# Patient Record
Sex: MALE | Race: WHITE | NOT HISPANIC OR LATINO | Employment: OTHER | URBAN - METROPOLITAN AREA
[De-identification: names, ages, dates, MRNs, and addresses within clinical notes are randomized per-mention and may not be internally consistent; named-entity substitution may affect disease eponyms.]

---

## 2017-01-05 ENCOUNTER — TELEPHONE (OUTPATIENT)
Dept: FAMILY MEDICINE | Facility: CLINIC | Age: 67
End: 2017-01-05

## 2017-01-05 NOTE — TELEPHONE ENCOUNTER
----- Message from Zandra Winston sent at 1/5/2017  1:10 PM CST -----  Contact: Patient   Patient request a call back at [phone], Regards to him changing his C-pap machine for a different one.    Thanks  Td

## 2017-01-12 RX ORDER — ALPRAZOLAM 1 MG/1
TABLET ORAL
Qty: 90 TABLET | Refills: 5 | Status: SHIPPED | OUTPATIENT
Start: 2017-01-12 | End: 2017-02-17 | Stop reason: SDUPTHER

## 2017-02-17 ENCOUNTER — OFFICE VISIT (OUTPATIENT)
Dept: FAMILY MEDICINE | Facility: CLINIC | Age: 67
End: 2017-02-17
Payer: COMMERCIAL

## 2017-02-17 VITALS
WEIGHT: 215.94 LBS | HEART RATE: 71 BPM | HEIGHT: 72 IN | SYSTOLIC BLOOD PRESSURE: 135 MMHG | DIASTOLIC BLOOD PRESSURE: 80 MMHG | BODY MASS INDEX: 29.25 KG/M2

## 2017-02-17 DIAGNOSIS — I10 ESSENTIAL HYPERTENSION: ICD-10-CM

## 2017-02-17 DIAGNOSIS — F41.9 ANXIETY: Primary | ICD-10-CM

## 2017-02-17 DIAGNOSIS — G47.33 OSA (OBSTRUCTIVE SLEEP APNEA): ICD-10-CM

## 2017-02-17 PROCEDURE — 3079F DIAST BP 80-89 MM HG: CPT | Mod: S$GLB,,, | Performed by: FAMILY MEDICINE

## 2017-02-17 PROCEDURE — 3075F SYST BP GE 130 - 139MM HG: CPT | Mod: S$GLB,,, | Performed by: FAMILY MEDICINE

## 2017-02-17 PROCEDURE — 99999 PR PBB SHADOW E&M-EST. PATIENT-LVL II: CPT | Mod: PBBFAC,,, | Performed by: FAMILY MEDICINE

## 2017-02-17 PROCEDURE — 99213 OFFICE O/P EST LOW 20 MIN: CPT | Mod: S$GLB,,, | Performed by: FAMILY MEDICINE

## 2017-02-17 RX ORDER — ALPRAZOLAM 1 MG/1
1 TABLET ORAL 3 TIMES DAILY
Qty: 90 TABLET | Refills: 5 | Status: SHIPPED | OUTPATIENT
Start: 2017-02-17 | End: 2017-07-26 | Stop reason: SDUPTHER

## 2017-02-17 NOTE — PROGRESS NOTES
The patient presents to fu anxiety gen tx well w alprazolam   ROS:  General: No fever or sig wt change  HEENT:No other PND eye pain or dc  Respiratory: No cough wheezing  PE: vital signs noted  HEENT: Normocephalic,with no recent trauma,PERRLA,EOMI,conjunctiva injected with no exudate.Nasopharynx is injected and edematous.External otic canal edematous and injected TM dull  Neck:Supple without adenopathy  Chest:Clear bilateral breath sounds    Heart:Regular rhthym without murmer  Abdomen:Soft, non tender,no masses, no hepatosplenomegaly  Extremeties and Neurologic:Grossly within normal limits  MSE: Alert oriented nl affect no SI or tangential thought   Impression:Anxiety  Plan: Rev risks benzos for now continue xanax

## 2017-02-20 ENCOUNTER — OFFICE VISIT (OUTPATIENT)
Dept: FAMILY MEDICINE | Facility: CLINIC | Age: 67
End: 2017-02-20
Payer: COMMERCIAL

## 2017-02-20 VITALS
HEART RATE: 76 BPM | SYSTOLIC BLOOD PRESSURE: 131 MMHG | BODY MASS INDEX: 28.97 KG/M2 | DIASTOLIC BLOOD PRESSURE: 79 MMHG | TEMPERATURE: 98 F | WEIGHT: 213.88 LBS | HEIGHT: 72 IN

## 2017-02-20 DIAGNOSIS — M79.10 TRIGGER POINT: Primary | ICD-10-CM

## 2017-02-20 PROCEDURE — 99213 OFFICE O/P EST LOW 20 MIN: CPT | Mod: S$GLB,,, | Performed by: FAMILY MEDICINE

## 2017-02-20 PROCEDURE — 3075F SYST BP GE 130 - 139MM HG: CPT | Mod: S$GLB,,, | Performed by: FAMILY MEDICINE

## 2017-02-20 PROCEDURE — 99999 PR PBB SHADOW E&M-EST. PATIENT-LVL III: CPT | Mod: PBBFAC,,, | Performed by: FAMILY MEDICINE

## 2017-02-20 PROCEDURE — 3078F DIAST BP <80 MM HG: CPT | Mod: S$GLB,,, | Performed by: FAMILY MEDICINE

## 2017-02-20 RX ORDER — MELOXICAM 15 MG/1
15 TABLET ORAL DAILY
Qty: 30 TABLET | Refills: 1 | Status: SHIPPED | OUTPATIENT
Start: 2017-02-20 | End: 2017-07-25

## 2017-02-20 RX ORDER — METHYLPREDNISOLONE 4 MG/1
TABLET ORAL
Qty: 1 PACKAGE | Refills: 0 | Status: SHIPPED | OUTPATIENT
Start: 2017-02-20 | End: 2017-03-01

## 2017-02-20 NOTE — PROGRESS NOTES
The patient presents with tender painful periscapular on rt  for the past several days    but no obvious injury  ROS:  General: No fever or sig wt change  HEENT:No other PND eye pain or dc  Respiratory: No cough wheezing  PE: vital signs noted  HEENT: Normocephalic,with no recent trauma,PERRLA,EOMI,conjunctiva injected with no exudate.Nasopharynx is injected and edematous.External otic canal edematous and injected TM dull  Neck:Supple without adenopathy  Chest:Clear bilateral breath sounds with mild scattered ronchi  Heart:Regular rhthym without murmer  Abdomen:Soft, non tender,no masses, no hepatosplenomegaly  Extremeties:Trigger pt rt rhomboid      Impression:Trigger pt    Plan: Meloxicam /medrol dspk consider inj

## 2017-02-28 ENCOUNTER — TELEPHONE (OUTPATIENT)
Dept: FAMILY MEDICINE | Facility: CLINIC | Age: 67
End: 2017-02-28

## 2017-02-28 NOTE — TELEPHONE ENCOUNTER
----- Message from Gila Lara sent at 2/28/2017 11:42 AM CST -----  states that he isn't feeling any better, pain going into arm. pls adv..[phone]

## 2017-03-01 ENCOUNTER — OFFICE VISIT (OUTPATIENT)
Dept: FAMILY MEDICINE | Facility: CLINIC | Age: 67
End: 2017-03-01
Payer: MEDICARE

## 2017-03-01 VITALS
SYSTOLIC BLOOD PRESSURE: 132 MMHG | BODY MASS INDEX: 28.86 KG/M2 | DIASTOLIC BLOOD PRESSURE: 82 MMHG | WEIGHT: 213.06 LBS | HEIGHT: 72 IN | HEART RATE: 86 BPM

## 2017-03-01 DIAGNOSIS — M79.10 TRIGGER POINT: Primary | ICD-10-CM

## 2017-03-01 PROCEDURE — 3079F DIAST BP 80-89 MM HG: CPT | Mod: S$GLB,,, | Performed by: FAMILY MEDICINE

## 2017-03-01 PROCEDURE — 99999 PR PBB SHADOW E&M-EST. PATIENT-LVL II: CPT | Mod: PBBFAC,,, | Performed by: FAMILY MEDICINE

## 2017-03-01 PROCEDURE — 1160F RVW MEDS BY RX/DR IN RCRD: CPT | Mod: S$GLB,,, | Performed by: FAMILY MEDICINE

## 2017-03-01 PROCEDURE — 3075F SYST BP GE 130 - 139MM HG: CPT | Mod: S$GLB,,, | Performed by: FAMILY MEDICINE

## 2017-03-01 PROCEDURE — 99213 OFFICE O/P EST LOW 20 MIN: CPT | Mod: S$GLB,,, | Performed by: FAMILY MEDICINE

## 2017-03-01 NOTE — PROGRESS NOTES
The patient presents with tender painful periscapular on rt  for the past several days   Sl better w po steroids then relapsed   ROS:  General: No fever or sig wt change  HEENT:No other PND eye pain or dc  Respiratory: No cough wheezing  PE: vital signs noted  HEENT: Normocephalic,with no recent trauma,PERRLA,EOMI,conjunctiva injected with no exudate.Nasopharynx is injected and edematous.External otic canal edematous and injected TM dull  Neck:Supple without adenopathy  Chest:Clear bilateral breath sounds with mild scattered ronchi  Heart:Regular rhthym without murmer  Abdomen:Soft, non tender,no masses, no hepatosplenomegaly  Extremeties:Trigger pt rt rhomboid      Impression:Trigger pt    Plan: Trigger pt inj  PT/massage

## 2017-07-25 ENCOUNTER — OFFICE VISIT (OUTPATIENT)
Dept: FAMILY MEDICINE | Facility: CLINIC | Age: 67
End: 2017-07-25
Payer: MEDICARE

## 2017-07-25 VITALS
BODY MASS INDEX: 30.8 KG/M2 | DIASTOLIC BLOOD PRESSURE: 88 MMHG | HEART RATE: 71 BPM | TEMPERATURE: 98 F | HEIGHT: 71 IN | WEIGHT: 220 LBS | SYSTOLIC BLOOD PRESSURE: 136 MMHG

## 2017-07-25 DIAGNOSIS — S16.1XXA NECK STRAIN, INITIAL ENCOUNTER: Primary | ICD-10-CM

## 2017-07-25 PROCEDURE — 99999 PR PBB SHADOW E&M-EST. PATIENT-LVL III: CPT | Mod: PBBFAC,,, | Performed by: NURSE PRACTITIONER

## 2017-07-25 PROCEDURE — 96372 THER/PROPH/DIAG INJ SC/IM: CPT | Mod: S$GLB,,, | Performed by: NURSE PRACTITIONER

## 2017-07-25 PROCEDURE — 99213 OFFICE O/P EST LOW 20 MIN: CPT | Mod: 25,S$GLB,, | Performed by: NURSE PRACTITIONER

## 2017-07-25 RX ORDER — CYCLOBENZAPRINE HCL 10 MG
10 TABLET ORAL 3 TIMES DAILY PRN
Qty: 30 TABLET | Refills: 0 | Status: SHIPPED | OUTPATIENT
Start: 2017-07-25 | End: 2017-08-04

## 2017-07-25 RX ORDER — TAMSULOSIN HYDROCHLORIDE 0.4 MG/1
0.4 CAPSULE ORAL
COMMUNITY
Start: 2017-05-26

## 2017-07-25 RX ORDER — SULINDAC 200 MG/1
200 TABLET ORAL 2 TIMES DAILY
Qty: 30 TABLET | Refills: 0 | Status: SHIPPED | OUTPATIENT
Start: 2017-07-25 | End: 2017-09-15 | Stop reason: SDUPTHER

## 2017-07-25 RX ORDER — METHYLPREDNISOLONE ACETATE 80 MG/ML
80 INJECTION, SUSPENSION INTRA-ARTICULAR; INTRALESIONAL; INTRAMUSCULAR; SOFT TISSUE ONCE
Status: COMPLETED | OUTPATIENT
Start: 2017-07-25 | End: 2017-07-25

## 2017-07-25 RX ADMIN — METHYLPREDNISOLONE ACETATE 80 MG: 80 INJECTION, SUSPENSION INTRA-ARTICULAR; INTRALESIONAL; INTRAMUSCULAR; SOFT TISSUE at 10:07

## 2017-07-25 NOTE — PROGRESS NOTES
Subjective:       Patient ID: Andrés Casillas is a 67 y.o. male.    Chief Complaint: Shoulder Pain (right shoulder)    Shoulder Pain    The pain is present in the neck and right shoulder. This is a new problem. The current episode started in the past 7 days. The problem occurs constantly. The problem has been gradually worsening. The quality of the pain is described as aching. The pain is severe. Associated symptoms include a limited range of motion and stiffness. Pertinent negatives include no fever or headaches. The symptoms are aggravated by activity. He has tried NSAIDS and cold (Massage) for the symptoms. The treatment provided no relief.       Review of Systems   Constitutional: Negative for fatigue, fever and unexpected weight change.   HENT: Negative for ear pain and sore throat.    Eyes: Negative.  Negative for pain and visual disturbance.   Respiratory: Negative for cough and shortness of breath.    Cardiovascular: Negative for chest pain and palpitations.   Gastrointestinal: Negative for abdominal pain, diarrhea, nausea and vomiting.   Genitourinary: Negative for dysuria and frequency.   Musculoskeletal: Positive for arthralgias, myalgias, neck pain and stiffness.   Skin: Negative for color change and rash.   Neurological: Negative for dizziness and headaches.   Psychiatric/Behavioral: Negative for sleep disturbance. The patient is not nervous/anxious.        Vitals:    07/25/17 1002   BP: 136/88   Pulse: 71   Temp: 97.7 °F (36.5 °C)       Objective:     Current Outpatient Prescriptions   Medication Sig Dispense Refill    alprazolam (XANAX) 1 MG tablet Take 1 tablet (1 mg total) by mouth 3 (three) times daily. 90 tablet 5    furosemide (LASIX) 40 MG tablet 40 mg daily as needed.   3    cyclobenzaprine (FLEXERIL) 10 MG tablet Take 1 tablet (10 mg total) by mouth 3 (three) times daily as needed for Muscle spasms. 30 tablet 0    sulindac (CLINORIL) 200 MG Tab Take 1 tablet (200 mg total) by mouth 2  (two) times daily. 30 tablet 0    tamsulosin (FLOMAX) 0.4 mg Cp24        No current facility-administered medications for this visit.        Physical Exam   Constitutional: He is oriented to person, place, and time. He appears well-developed. No distress.   HENT:   Head: Normocephalic and atraumatic.   Eyes: EOM are normal. Pupils are equal, round, and reactive to light.   Neck: Normal range of motion. Neck supple.   Cardiovascular: Normal rate and regular rhythm.    Pulmonary/Chest: Effort normal and breath sounds normal.   Musculoskeletal:        Right shoulder: He exhibits decreased range of motion and tenderness.        Cervical back: He exhibits tenderness.        Arms:  Neurological: He is alert and oriented to person, place, and time.   Skin: Skin is warm and dry. No rash noted.   Psychiatric: He has a normal mood and affect. Thought content normal.   Nursing note and vitals reviewed.      Assessment:       1. Neck strain, initial encounter        Plan:   Neck strain, initial encounter  -     methylPREDNISolone acetate injection 80 mg; Inject 1 mL (80 mg total) into the muscle once.    Other orders  -     cyclobenzaprine (FLEXERIL) 10 MG tablet; Take 1 tablet (10 mg total) by mouth 3 (three) times daily as needed for Muscle spasms.  Dispense: 30 tablet; Refill: 0  -     sulindac (CLINORIL) 200 MG Tab; Take 1 tablet (200 mg total) by mouth 2 (two) times daily.  Dispense: 30 tablet; Refill: 0    alternate heat and ice    Return if symptoms worsen or fail to improve.

## 2017-07-26 RX ORDER — ALPRAZOLAM 1 MG/1
TABLET ORAL
Qty: 90 TABLET | Refills: 5 | Status: SHIPPED | OUTPATIENT
Start: 2017-07-26 | End: 2018-02-17 | Stop reason: SDUPTHER

## 2017-09-15 ENCOUNTER — OFFICE VISIT (OUTPATIENT)
Dept: FAMILY MEDICINE | Facility: CLINIC | Age: 67
End: 2017-09-15
Payer: MEDICARE

## 2017-09-15 VITALS
BODY MASS INDEX: 30.66 KG/M2 | TEMPERATURE: 98 F | WEIGHT: 219 LBS | HEIGHT: 71 IN | HEART RATE: 77 BPM | SYSTOLIC BLOOD PRESSURE: 138 MMHG | DIASTOLIC BLOOD PRESSURE: 86 MMHG

## 2017-09-15 DIAGNOSIS — S16.1XXD NECK STRAIN, SUBSEQUENT ENCOUNTER: Primary | ICD-10-CM

## 2017-09-15 PROCEDURE — 99999 PR PBB SHADOW E&M-EST. PATIENT-LVL IV: CPT | Mod: PBBFAC,,, | Performed by: NURSE PRACTITIONER

## 2017-09-15 PROCEDURE — 3079F DIAST BP 80-89 MM HG: CPT | Mod: S$GLB,,, | Performed by: NURSE PRACTITIONER

## 2017-09-15 PROCEDURE — 3008F BODY MASS INDEX DOCD: CPT | Mod: S$GLB,,, | Performed by: NURSE PRACTITIONER

## 2017-09-15 PROCEDURE — 99213 OFFICE O/P EST LOW 20 MIN: CPT | Mod: S$GLB,,, | Performed by: NURSE PRACTITIONER

## 2017-09-15 PROCEDURE — 3075F SYST BP GE 130 - 139MM HG: CPT | Mod: S$GLB,,, | Performed by: NURSE PRACTITIONER

## 2017-09-15 RX ORDER — TRAMADOL HYDROCHLORIDE 50 MG/1
50 TABLET ORAL EVERY 6 HOURS PRN
Qty: 28 TABLET | Refills: 0 | Status: SHIPPED | OUTPATIENT
Start: 2017-09-15 | End: 2017-09-22

## 2017-09-15 RX ORDER — SULINDAC 200 MG/1
200 TABLET ORAL 2 TIMES DAILY
Qty: 30 TABLET | Refills: 0 | Status: SHIPPED | OUTPATIENT
Start: 2017-09-15 | End: 2018-02-17

## 2017-09-15 RX ORDER — CYCLOBENZAPRINE HCL 10 MG
10 TABLET ORAL 3 TIMES DAILY PRN
Qty: 30 TABLET | Refills: 0 | Status: SHIPPED | OUTPATIENT
Start: 2017-09-15 | End: 2017-09-25

## 2017-09-15 NOTE — PATIENT INSTRUCTIONS
Understanding Cervical Radiculopathy    Cervical radiculopathy is irritation or inflammation of a nerve root in the neck. It causes neck pain and other symptoms that may spread into the chest or down the arm. To understand this condition, it helps to understand the parts of the spine:  · Vertebrae. These are bones that stack to form the spine. The cervical spine contains the 7 vertebrae in the neck.  · Disks. These are soft pads of tissue between the vertebrae. They act as shock absorbers for the spine.  · The spinal canal. This is a tunnel formed within the stacked vertebrae. The spinal cord runs through this canal.  · Nerves. These branch off the spinal cord. As they leave the spinal canal, nerves pass through openings between the vertebrae. The nerve root is the part of the nerve that is closest to the spinal cord.   With cervical radiculopathy, nerve roots in the neck become irritated. This leads to pain and symptoms that can travel to the nerves that go from the spinal cord down the arms and into the torso.  What causes cervical radiculopathy?  Aging, injury, poor posture, and other issues can lead to problems in the neck. These problems may then irritate nerve roots. These include:  · Damage to a disk in the cervical spine. The damaged disk may then press on nearby nerve roots.  · Degeneration from wear and tear, and aging. This can lead to narrowing (stenosis) of the openings between the vertebrae. The narrowed openings press on nerve roots as they leave the spinal canal.  · An unstable spine. This is when a vertebra slips forward. It can then press on a nerve root.  There are other, less common causes of pressure on nerves in the neck. These include infection, cysts, and tumors.  Symptoms of cervical radiculopathy  These include:  · Neck pain  · Pain, numbness, tingling, or weakness that travels down the arm  · Loss of neck movement  · Muscle spasms  Treatment for cervical radiculopathy  In most cases,  your healthcare provider will first try treatments that help relieve symptoms. These may include:  · Prescription or over-the-counter pain medicines. These help relieve pain and swelling.  · Cold packs. These help reduce pain.  · Resting. This involves avoiding positions and activities that increase pain.  · Neck brace (cervical collar). This can help relieve inflammation and pain.  · Physical therapy, including exercises and stretches. This can help decrease pain and increase movement and function.  · Shots of medicinesaround the nerve roots. This is done to help relieve symptoms for a time.  In some cases, your healthcare provider may advise surgery to fix the underlying problem. This depends on the cause, the symptoms, and how long the pain has lasted.  Possible complications  Over time, an irritated and inflamed nerve may become damaged. This may lead to long-lasting (permanent) numbness or weakness. If symptoms change suddenly or get worse, be sure to let your healthcare provider know.     When to call your healthcare provider  Call your healthcare provider right away if you have any of these:  · New pain or pain that gets worse  · New or increasing weakness, numbness, or tingling in your arm or hand  · Bowel or bladder changes   Date Last Reviewed: 3/10/2016  © 5866-8741 [employer]. [address] [place] [address], [place] [address]. All rights reserved. This information is not intended as a substitute for professional medical care. Always follow your healthcare professional's instructions.        General Neck and Back Pain    Both neck and back pain are usually caused by injury to the muscles or ligaments of the spine. Sometimes the disks that separate each bone of the spine may cause pain by pressing on a nearby nerve. Back and neck pain may appear after a sudden twisting or bending force (such as in a car accident), or sometimes after a simple awkward movement. In either case, muscle spasm is often  present and adds to the pain.  Acute neck and back pain usually gets better in 1 to 2 weeks. Pain related to disk disease, arthritis in the spinal joints or spinal stenosis (narrowing of the spinal canal) can become chronic and last for months or years.  Back and neck pain are common problems. Most people feel better in 1 or 2 weeks, and most of the rest in 1 to 2 months. Most people can remain active.  People experience and describe pain differently.  · Pain can be sharp, stabbing, shooting, aching, cramping, or burning  · Movement, standing, bending, lifting, sitting, or walking may worsen the pain  · Pain can be localized to one spot or area, or it can be more generalized  · Pain can spread or radiate upwards, downwards, to the front, or go down your arms  · Muscle spasm may occur.  Most of the time mechanical problems with the muscles or spine cause the pain. it is usually caused by an injury, whether known or not, to the muscles or ligaments. While illnesses can cause back pain, it is usually not caused by a serious illness. Pain is usually related to physical activity, whether sports, exercise, work, or normal activity. Sometimes it can occur without an identifiable cause. This can happen simply by stretching or moving wrong, without noting pain at the time. Other causes include:  · Overexertion, lifting, pushing, pulling incorrectly or too aggressively.  · Sudden twisting, bending or stretching from an accident (car or fall), or accidental movement.  · Poor posture  · Poor conditioning, lack of regular exercise  · Spinal disc disease or arthritis  · Stress  · Pregnancy, or illness like appendicitis, bladder or kidney infection, pelvic infections   Home care  · For neck pain: Use a comfortable pillow that supports the head and keeps the spine in a neutral position. The position of the head should not be tilted forward or backward.  · When in bed, try to find a position of comfort. A firm mattress is best. Try  lying flat on your back with pillows under your knees. You can also try lying on your side with your knees bent up towards your chest and a pillow between your knees.  · At first, do not try to stretch out the sore spots. If there is a strain, it is not like the good soreness you get after exercising without an injury. In this case, stretching may make it worse.  · Avoid prolonged sitting, long car rides or travel. This puts more stress on the lower back than standing or walking.  · During the first 24 to 72 hours after an injury, apply an ice pack to the painful area for 20 minutes and then remove it for 20 minutes over a period of 60 to 90 minutes or several times a day.   · You can alternate ice and heat therapies. Talk with your healthcare provider about the best treatment for your back or neck pain. As a safety precaution, do not use a heating pad at bedtime. Sleeping with a heating pad can lead to skin burns or tissue damage.  · Therapeutic massage can help relax the back and neck muscles without stretching them.  · Be aware of safe lifting methods and do not lift anything over 15 pounds until all the pain is gone.  Medications  Talk to your healthcare provider before using medicine, especially if you have other medical problems or are taking other medicines.  · You may use over-the-counter medicine to control pain, unless another pain medicine was prescribed. If you have chronic conditions like diabetes, liver or kidney disease, stomach ulcers,  gastrointestinal bleeding, or are taking blood thinner medicines.  · Be careful if you are given pain medicines, narcotics, or medicine for muscle spasm. They can cause drowsiness, and can affect your coordination, reflexes, and judgment. Do not drive or operate heavy machinery.  Follow-up care  Follow up with your healthcare provider, or as advised. Physical therapy or further tests may be needed.  If X-rays were taken, you will be notified of any new findings that  may affect your care.  Call 911  Seek emergency medical care if any of the following occur:  · Trouble breathing  · Confusion  · Very drowsy or trouble awakening  · Fainting or loss of consciousness  · Rapid or very slow heart rate  · Loss of bowel or bladder control  When to seek medical advice  Call your healthcare provider right away if any of these occur:  · Pain becomes worse or spreads into your arms or legs  · Weakness, numbness or pain in one or both arms or legs  · Numbness in the groin area  · Difficulty walking  · Fever of 100.4ºF (38ºC) or higher, or as directed by your healthcare provider  Date Last Reviewed: 7/1/2016  © 0633-3367 [employer]. [address] [place] [address], [place] [address]. All rights reserved. This information is not intended as a substitute for professional medical care. Always follow your healthcare professional's instructions.

## 2017-09-15 NOTE — PROGRESS NOTES
"    Patient ID: Andrés Casillas is a 67 y.o. male.    Chief Complaint: Shoulder Pain     Numbness in right arm and tingling in left 5th finger.      Shoulder Pain     This is a new problem. Episode onset: 3 days ago.  There has been no history of extremity trauma. The problem has been gradually worsening. Quality:  constant pain. Associated symptoms include numbness, stiffness and tingling. Pertinent negatives include no fever. He has tried NSAIDS and heat for the symptoms. The treatment provided mild relief. Family history does not include arthritis. There is no history of diabetes, Injuries to Extremity or migraines.   Received steroid injection IM in July for same complaints.  He reports he began with symptoms a few days after working out in the gym.    Review of Systems   Constitutional: Negative for chills, fatigue and fever.   HENT: Negative.    Eyes: Negative.    Respiratory: Negative for cough, shortness of breath and wheezing.    Cardiovascular: Negative for chest pain, palpitations and leg swelling.   Gastrointestinal: Negative.    Endocrine: Negative.    Genitourinary: Negative.    Musculoskeletal: Positive for back pain, myalgias and stiffness.   Skin: Negative for rash and wound.   Neurological: Positive for tingling and numbness. Negative for weakness.   Hematological: Negative.    Psychiatric/Behavioral: The patient is not nervous/anxious.          Objective:      /86   Pulse 77   Temp 98.1 °F (36.7 °C) (Oral)   Ht 5' 11" (1.803 m)   Wt 99.3 kg (219 lb)   BMI 30.54 kg/m²     Physical Exam   Constitutional: He is oriented to person, place, and time. He appears well-developed and well-nourished.   HENT:   Head: Normocephalic.   Eyes: Pupils are equal, round, and reactive to light.   Cardiovascular: Normal rate, regular rhythm and normal heart sounds.    Pulmonary/Chest: Effort normal and breath sounds normal. No respiratory distress. He has no wheezes. He has no rales.   Musculoskeletal:    "     Left shoulder: He exhibits decreased range of motion, tenderness and pain. He exhibits no bony tenderness, no swelling, no effusion, no crepitus, no deformity, no laceration, no spasm, normal pulse and normal strength.        Cervical back: He exhibits tenderness. He exhibits normal range of motion.        Arms:  Spasm and tenderness to left upper back with palpation just under left scapula   Neurological: He is alert and oriented to person, place, and time.   Skin: Skin is warm and dry. No rash noted.   Psychiatric: He has a normal mood and affect. His behavior is normal. Judgment and thought content normal.   Vitals reviewed.      Assessment:       1. Neck strain, subsequent encounter          Plan:   Neck strain, subsequent encounter  -     cyclobenzaprine (FLEXERIL) 10 MG tablet; Take 1 tablet (10 mg total) by mouth 3 (three) times daily as needed for Muscle spasms.  Dispense: 30 tablet; Refill: 0  -     sulindac (CLINORIL) 200 MG Tab; Take 1 tablet (200 mg total) by mouth 2 (two) times daily.  Dispense: 30 tablet; Refill: 0  -     tramadol (ULTRAM) 50 mg tablet; Take 1 tablet (50 mg total) by mouth every 6 (six) hours as needed for Pain.  Dispense: 28 tablet; Refill: 0  -     Ambulatory Referral to Physical/Occupational Therapy           Return if symptoms worsen or fail to improve.

## 2017-09-25 ENCOUNTER — TELEPHONE (OUTPATIENT)
Dept: FAMILY MEDICINE | Facility: CLINIC | Age: 67
End: 2017-09-25

## 2017-09-25 NOTE — TELEPHONE ENCOUNTER
----- Message from Emily Calderon sent at 9/25/2017  1:56 PM CDT -----  Contact: pt   Pt would like to get a new order for physical therapy, he wants something closer to his house, please call him back at [phone]  [facility]

## 2018-02-17 ENCOUNTER — OFFICE VISIT (OUTPATIENT)
Dept: FAMILY MEDICINE | Facility: CLINIC | Age: 68
End: 2018-02-17
Payer: MEDICARE

## 2018-02-17 VITALS
BODY MASS INDEX: 31.78 KG/M2 | TEMPERATURE: 98 F | SYSTOLIC BLOOD PRESSURE: 130 MMHG | HEART RATE: 92 BPM | HEIGHT: 71 IN | DIASTOLIC BLOOD PRESSURE: 87 MMHG | WEIGHT: 227 LBS

## 2018-02-17 DIAGNOSIS — M25.561 ACUTE PAIN OF RIGHT KNEE: Primary | ICD-10-CM

## 2018-02-17 PROCEDURE — 3008F BODY MASS INDEX DOCD: CPT | Mod: S$GLB,,, | Performed by: FAMILY MEDICINE

## 2018-02-17 PROCEDURE — 99213 OFFICE O/P EST LOW 20 MIN: CPT | Mod: S$GLB,,, | Performed by: FAMILY MEDICINE

## 2018-02-17 PROCEDURE — 99999 PR PBB SHADOW E&M-EST. PATIENT-LVL III: CPT | Mod: PBBFAC,,, | Performed by: FAMILY MEDICINE

## 2018-02-17 RX ORDER — ALPRAZOLAM 1 MG/1
TABLET ORAL
Qty: 90 TABLET | Refills: 0 | Status: SHIPPED | OUTPATIENT
Start: 2018-02-17 | End: 2018-03-15 | Stop reason: SDUPTHER

## 2018-02-17 RX ORDER — MELOXICAM 15 MG/1
15 TABLET ORAL DAILY
Qty: 30 TABLET | Refills: 0 | Status: SHIPPED | OUTPATIENT
Start: 2018-02-17 | End: 2018-08-01

## 2018-02-17 RX ORDER — METHYLPREDNISOLONE 4 MG/1
TABLET ORAL
Qty: 21 TABLET | Refills: 0 | Status: SHIPPED | OUTPATIENT
Start: 2018-02-17 | End: 2018-03-15

## 2018-02-18 NOTE — PROGRESS NOTES
Complains of some right knee pain approximately past 10 days.  He started doing some increased exercise recently.  No injury.  Current treatment ibuprofen with temporary improvement.  No swelling.  Symptoms lateral and medial.    Past Medical History:  Past Medical History:   Diagnosis Date    Anxiety     BPH (benign prostatic hypertrophy)     DDD (degenerative disc disease), lumbar     Hypertension     GAURANG (obstructive sleep apnea)      Past Surgical History:   Procedure Laterality Date    JOINT REPLACEMENT       Social History     Social History    Marital status: Single     Spouse name: N/A    Number of children: N/A    Years of education: N/A     Occupational History     [employer]     Social History Main Topics    Smoking status: Never Smoker    Smokeless tobacco: Never Used    Alcohol use 1.0 oz/week     2 Standard drinks or equivalent per week      Comment: socially    Drug use: No    Sexual activity: Yes     Partners: Female     Other Topics Concern    Not on file     Social History Narrative    ** Merged History Encounter **          Family History   Problem Relation Age of Onset    Heart disease Mother     Cancer Mother     Heart disease Father     Prostate cancer Brother      Review of patient's allergies indicates:   Allergen Reactions    No known drug allergies      Current Outpatient Prescriptions on File Prior to Visit   Medication Sig Dispense Refill    furosemide (LASIX) 40 MG tablet 40 mg daily as needed.   3    tamsulosin (FLOMAX) 0.4 mg Cp24       [DISCONTINUED] alprazolam (XANAX) 1 MG tablet TAKE ONE TABLET BY MOUTH THREE TIMES DAILY 90 tablet 5    [DISCONTINUED] sulindac (CLINORIL) 200 MG Tab Take 1 tablet (200 mg total) by mouth 2 (two) times daily. 30 tablet 0     No current facility-administered medications on file prior to visit.          OBJECTIVE:     Vitals:    02/17/18 0922   BP: 130/87   Pulse: 92   Temp: 97.9 °F (36.6 °C)   TempSrc: Oral   Weight: 103 kg  "(227 lb)   Height: 5' 11" (1.803 m)     Wt Readings from Last 3 Encounters:   02/17/18 103 kg (227 lb)   09/15/17 99.3 kg (219 lb)   07/25/17 99.8 kg (220 lb)     APPEARANCE: Well nourished, well developed, in no acute distress.    PERIPHERAL VASCULAR: No cyanosis.  No clubbing.  No edema.  NEUROLOGIC: No focal findings.  MENTAL STATUS: Alert.  Oriented x 3.  Both knees full range of motion.  Mild tenderness palpation lateral and medial joint line on the right.  No effusion.  Negative Lachman's and Elaine's.    Andrés was seen today for knee pain.    Diagnoses and all orders for this visit:    Acute pain of right knee    Other orders  -     meloxicam (MOBIC) 15 MG tablet; Take 1 tablet (15 mg total) by mouth once daily.  -     ranitidine (ZANTAC) 150 MG tablet; Take 1 tablet (150 mg total) by mouth 2 (two) times daily.  -     methylPREDNISolone (MEDROL DOSEPACK) 4 mg tablet; follow package directions      Relative rest.  Follow-up PCP symptoms not improving in the next couple weeks.          "

## 2018-03-06 ENCOUNTER — PATIENT OUTREACH (OUTPATIENT)
Dept: ADMINISTRATIVE | Facility: HOSPITAL | Age: 68
End: 2018-03-06

## 2018-03-06 NOTE — LETTER
March 6, 2018        We are seeing Andrés Casillas, 1950, at [facility]. Cabrera Alan MD is their primary care physician. To help with our [place] maintenance records could you please send the following:     PATIENT LAST COLONOSCOPY REPORT    Please fax to [facility] at [phone], attention Stacy López LPN.    Thank-you in advance for your assistance. If you have any questions or concerns please contact me at [phone].     Stacy López LPN  Care Coordination Department  [facility]

## 2018-03-06 NOTE — PROGRESS NOTES
Colonoscopy was to be done by Dr. Rich Richards per pt 4/17/16 note. Consult done with Dr. Richards 5/18/2016. Request sent for pt Colonoscopy Report.

## 2018-03-07 NOTE — PROGRESS NOTES
Colonoscopy report dated 5/4/2016 received from Dr. Rich Richards [facility]. HM update and report sent to scanning.

## 2018-03-15 ENCOUNTER — OFFICE VISIT (OUTPATIENT)
Dept: FAMILY MEDICINE | Facility: CLINIC | Age: 68
End: 2018-03-15
Payer: MEDICARE

## 2018-03-15 VITALS
BODY MASS INDEX: 30.77 KG/M2 | WEIGHT: 219.81 LBS | SYSTOLIC BLOOD PRESSURE: 137 MMHG | TEMPERATURE: 98 F | HEART RATE: 77 BPM | DIASTOLIC BLOOD PRESSURE: 82 MMHG | HEIGHT: 71 IN

## 2018-03-15 DIAGNOSIS — S89.91XD INJURY OF RIGHT KNEE, SUBSEQUENT ENCOUNTER: Primary | ICD-10-CM

## 2018-03-15 PROCEDURE — 3075F SYST BP GE 130 - 139MM HG: CPT | Mod: CPTII,S$GLB,, | Performed by: FAMILY MEDICINE

## 2018-03-15 PROCEDURE — 99999 PR PBB SHADOW E&M-EST. PATIENT-LVL III: CPT | Mod: PBBFAC,,, | Performed by: FAMILY MEDICINE

## 2018-03-15 PROCEDURE — 3079F DIAST BP 80-89 MM HG: CPT | Mod: CPTII,S$GLB,, | Performed by: FAMILY MEDICINE

## 2018-03-15 PROCEDURE — 99213 OFFICE O/P EST LOW 20 MIN: CPT | Mod: S$GLB,,, | Performed by: FAMILY MEDICINE

## 2018-03-15 RX ORDER — ALPRAZOLAM 1 MG/1
1 TABLET ORAL 3 TIMES DAILY
Qty: 90 TABLET | Refills: 4 | Status: CANCELLED | OUTPATIENT
Start: 2018-03-15

## 2018-03-15 RX ORDER — HYDROCODONE BITARTRATE AND ACETAMINOPHEN 5; 325 MG/1; MG/1
1 TABLET ORAL EVERY 6 HOURS PRN
COMMUNITY
Start: 2018-03-09 | End: 2018-08-01

## 2018-03-15 RX ORDER — ALPRAZOLAM 1 MG/1
TABLET ORAL
Qty: 90 TABLET | Refills: 5 | Status: SHIPPED | OUTPATIENT
Start: 2018-03-15 | End: 2018-08-01 | Stop reason: SDUPTHER

## 2018-03-22 RX ORDER — ALBUTEROL SULFATE 90 UG/1
2 AEROSOL, METERED RESPIRATORY (INHALATION) EVERY 6 HOURS PRN
Qty: 18 G | Refills: 4 | Status: SHIPPED | OUTPATIENT
Start: 2018-03-22 | End: 2019-04-02 | Stop reason: SDUPTHER

## 2018-03-22 NOTE — TELEPHONE ENCOUNTER
----- Message from Vilma Bowling sent at 3/22/2018  3:47 PM CDT -----  Contact: pt  Pt states he's call for prescription for a inhaler, he can be reached at [phone] Thanks      [facility] [address] [facility]  [address] [address]  Phone: [phone] Fax: [phone]

## 2018-04-04 ENCOUNTER — TELEPHONE (OUTPATIENT)
Dept: FAMILY MEDICINE | Facility: CLINIC | Age: 68
End: 2018-04-04

## 2018-04-04 NOTE — TELEPHONE ENCOUNTER
----- Message from Jaime Acevedo sent at 4/4/2018  4:33 PM CDT -----  Contact: Pt   Pt requested a callback  need to verify if images of his knee was received callback number is..[phone]

## 2018-04-04 NOTE — TELEPHONE ENCOUNTER
Spoke with patient, he didn't remember if he signed a release request for records.  He will just go get records fr Dr. Boykin and bring them to us.

## 2018-07-24 ENCOUNTER — TELEPHONE (OUTPATIENT)
Dept: FAMILY MEDICINE | Facility: CLINIC | Age: 68
End: 2018-07-24

## 2018-07-24 DIAGNOSIS — I10 HYPERTENSION, UNSPECIFIED TYPE: ICD-10-CM

## 2018-07-24 DIAGNOSIS — R53.83 FATIGUE, UNSPECIFIED TYPE: Primary | ICD-10-CM

## 2018-07-24 NOTE — TELEPHONE ENCOUNTER
----- Message from Smiley Delgadillo sent at 7/24/2018  2:58 PM CDT -----  Contact: self [phone] or [phone]  States that he would like an order put in for labs. States that he is not feeling good and would like to have some labs ordered. Please call back at [phone] or [phone]//thank you acc

## 2018-07-26 ENCOUNTER — TELEPHONE (OUTPATIENT)
Dept: FAMILY MEDICINE | Facility: CLINIC | Age: 68
End: 2018-07-26

## 2018-07-26 ENCOUNTER — LAB VISIT (OUTPATIENT)
Dept: LAB | Facility: HOSPITAL | Age: 68
End: 2018-07-26
Attending: FAMILY MEDICINE
Payer: MEDICARE

## 2018-07-26 DIAGNOSIS — R53.83 FATIGUE, UNSPECIFIED TYPE: ICD-10-CM

## 2018-07-26 DIAGNOSIS — G47.33 OSA (OBSTRUCTIVE SLEEP APNEA): Primary | ICD-10-CM

## 2018-07-26 DIAGNOSIS — I10 HYPERTENSION, UNSPECIFIED TYPE: ICD-10-CM

## 2018-07-26 LAB
ALBUMIN SERPL BCP-MCNC: 4 G/DL
ALP SERPL-CCNC: 111 U/L
ALT SERPL W/O P-5'-P-CCNC: 35 U/L
ANION GAP SERPL CALC-SCNC: 7 MMOL/L
AST SERPL-CCNC: 32 U/L
BASOPHILS # BLD AUTO: 0.06 K/UL
BASOPHILS NFR BLD: 1.1 %
BILIRUB SERPL-MCNC: 0.7 MG/DL
BUN SERPL-MCNC: 25 MG/DL
CALCIUM SERPL-MCNC: 9.5 MG/DL
CHLORIDE SERPL-SCNC: 107 MMOL/L
CHOLEST SERPL-MCNC: 150 MG/DL
CHOLEST/HDLC SERPL: 3 {RATIO}
CO2 SERPL-SCNC: 26 MMOL/L
CREAT SERPL-MCNC: 1 MG/DL
DIFFERENTIAL METHOD: ABNORMAL
EOSINOPHIL # BLD AUTO: 0.2 K/UL
EOSINOPHIL NFR BLD: 4.2 %
ERYTHROCYTE [DISTWIDTH] IN BLOOD BY AUTOMATED COUNT: 13.2 %
EST. GFR  (AFRICAN AMERICAN): >60 ML/MIN/1.73 M^2
EST. GFR  (NON AFRICAN AMERICAN): >60 ML/MIN/1.73 M^2
GLUCOSE SERPL-MCNC: 100 MG/DL
HCT VFR BLD AUTO: 50.9 %
HDLC SERPL-MCNC: 50 MG/DL
HDLC SERPL: 33.3 %
HGB BLD-MCNC: 16.9 G/DL
IMM GRANULOCYTES # BLD AUTO: 0.01 K/UL
IMM GRANULOCYTES NFR BLD AUTO: 0.2 %
LDLC SERPL CALC-MCNC: 90 MG/DL
LYMPHOCYTES # BLD AUTO: 2.6 K/UL
LYMPHOCYTES NFR BLD: 45.1 %
MCH RBC QN AUTO: 31.4 PG
MCHC RBC AUTO-ENTMCNC: 33.2 G/DL
MCV RBC AUTO: 95 FL
MONOCYTES # BLD AUTO: 0.7 K/UL
MONOCYTES NFR BLD: 11.9 %
NEUTROPHILS # BLD AUTO: 2.1 K/UL
NEUTROPHILS NFR BLD: 37.5 %
NONHDLC SERPL-MCNC: 100 MG/DL
NRBC BLD-RTO: 0 /100 WBC
PLATELET # BLD AUTO: 247 K/UL
PMV BLD AUTO: 10.6 FL
POTASSIUM SERPL-SCNC: 4.9 MMOL/L
PROT SERPL-MCNC: 7.1 G/DL
RBC # BLD AUTO: 5.38 M/UL
SODIUM SERPL-SCNC: 140 MMOL/L
TRIGL SERPL-MCNC: 50 MG/DL
WBC # BLD AUTO: 5.7 K/UL

## 2018-07-26 PROCEDURE — 36415 COLL VENOUS BLD VENIPUNCTURE: CPT | Mod: PO

## 2018-07-26 PROCEDURE — 80053 COMPREHEN METABOLIC PANEL: CPT

## 2018-07-26 PROCEDURE — 80061 LIPID PANEL: CPT

## 2018-07-26 PROCEDURE — 85025 COMPLETE CBC W/AUTO DIFF WBC: CPT

## 2018-07-26 NOTE — TELEPHONE ENCOUNTER
----- Message from [place]o Connor sent at 7/26/2018  2:23 PM CDT -----  Contact: pt  Please give pt a call at [phone] regarding a sleep study being scheduled.

## 2018-07-27 ENCOUNTER — TELEPHONE (OUTPATIENT)
Dept: FAMILY MEDICINE | Facility: CLINIC | Age: 68
End: 2018-07-27

## 2018-07-27 NOTE — TELEPHONE ENCOUNTER
----- Message from Cabrera Alan MD sent at 7/27/2018  8:54 AM CDT -----  I need to document clinical reasons for the sleep test referral-pls set him up for appt at his convenience and we can count this for his xanax refill so he wont have to come back in September  ----- Message -----  From: Amandeep Selby MD  Sent: 7/27/2018   8:40 AM  To: Cabrera Alan MD, Kyle Alan,  To approve the test need supporting clinicals in chart  Please document and let me know    Thanks    Amandeep Selby MD    ----- Message -----  From: Kyle Scruggs  Sent: 7/27/2018   8:17 AM  To: Amandeep Selby MD    Review [facility]

## 2018-07-27 NOTE — TELEPHONE ENCOUNTER
----- Message from Nellie Kay sent at 7/27/2018  9:07 AM CDT -----  Pt returning phone call..[phone]

## 2018-08-01 ENCOUNTER — OFFICE VISIT (OUTPATIENT)
Dept: FAMILY MEDICINE | Facility: CLINIC | Age: 68
End: 2018-08-01
Payer: MEDICARE

## 2018-08-01 VITALS
WEIGHT: 214.19 LBS | SYSTOLIC BLOOD PRESSURE: 136 MMHG | DIASTOLIC BLOOD PRESSURE: 82 MMHG | HEIGHT: 71 IN | BODY MASS INDEX: 29.98 KG/M2 | TEMPERATURE: 98 F | HEART RATE: 77 BPM

## 2018-08-01 DIAGNOSIS — G47.33 OSA (OBSTRUCTIVE SLEEP APNEA): Primary | ICD-10-CM

## 2018-08-01 PROCEDURE — 3075F SYST BP GE 130 - 139MM HG: CPT | Mod: CPTII,S$GLB,, | Performed by: FAMILY MEDICINE

## 2018-08-01 PROCEDURE — 3079F DIAST BP 80-89 MM HG: CPT | Mod: CPTII,S$GLB,, | Performed by: FAMILY MEDICINE

## 2018-08-01 PROCEDURE — 99999 PR PBB SHADOW E&M-EST. PATIENT-LVL III: CPT | Mod: PBBFAC,,, | Performed by: FAMILY MEDICINE

## 2018-08-01 PROCEDURE — 99213 OFFICE O/P EST LOW 20 MIN: CPT | Mod: S$GLB,,, | Performed by: FAMILY MEDICINE

## 2018-08-01 RX ORDER — ALPRAZOLAM 1 MG/1
1 TABLET ORAL 3 TIMES DAILY
Qty: 90 TABLET | Refills: 5 | Status: SHIPPED | OUTPATIENT
Start: 2018-08-01 | End: 2019-02-14

## 2018-08-01 NOTE — PROGRESS NOTES
The patient presents today to evaluate GAURANG but has lost weight and questions if he still gets symptoms and whether he still needs CPAP. Has not been evaluated w sleep study in over 18 years.   Also repoprts severe fatigue   Past Medical History:  Past Medical History:   Diagnosis Date    Anxiety     BPH (benign prostatic hypertrophy)     DDD (degenerative disc disease), lumbar     Hypertension     GAURANG (obstructive sleep apnea)      Past Surgical History:   Procedure Laterality Date    JOINT REPLACEMENT       Review of patient's allergies indicates:   Allergen Reactions    No known drug allergies      Current Outpatient Prescriptions on File Prior to Visit   Medication Sig Dispense Refill    albuterol 90 mcg/actuation inhaler Inhale 2 puffs into the lungs every 6 (six) hours as needed for Wheezing. Rescue 18 g 4    ALPRAZolam (XANAX) 1 MG tablet TAKE ONE TABLET BY MOUTH THREE TIMES DAILY 90 tablet 5    furosemide (LASIX) 40 MG tablet 40 mg daily as needed.   3    tamsulosin (FLOMAX) 0.4 mg Cp24       [DISCONTINUED] hydrocodone-acetaminophen 5-325mg (NORCO) 5-325 mg per tablet Take 1 tablet by mouth every 6 (six) hours as needed.       [DISCONTINUED] meloxicam (MOBIC) 15 MG tablet Take 1 tablet (15 mg total) by mouth once daily. 30 tablet 0    [DISCONTINUED] ranitidine (ZANTAC) 150 MG tablet Take 1 tablet (150 mg total) by mouth 2 (two) times daily. 60 tablet 0     No current facility-administered medications on file prior to visit.      Social History     Social History    Marital status: Single     Spouse name: N/A    Number of children: N/A    Years of education: N/A     Occupational History     [employer]     Social History Main Topics    Smoking status: Never Smoker    Smokeless tobacco: Never Used    Alcohol use 1.0 oz/week     2 Standard drinks or equivalent per week      Comment: socially    Drug use: No    Sexual activity: Yes     Partners: Female     Other Topics Concern    Not on  file     Social History Narrative    ** Merged History Encounter **          Family History   Problem Relation Age of Onset    Heart disease Mother     Cancer Mother     Heart disease Father     Prostate cancer Brother          ROS:GENERAL: No fever, chills, fatigability or weight loss.  SKIN: No rashes, itching or changes in color or texture of skin.  HEAD: No headaches or recent head trauma.EYES: Visual acuity fine. No photophobia, ocular pain or diplopia.EARS: Denies ear pain, discharge or vertigo.NOSE: No loss of smell, no epistaxis or postnasal drip.MOUTH & THROAT: No hoarseness or change in voice. No excessive gum bleeding.NODES: Denies swollen glands.  CHEST: Denies RIVAS, cyanosis, wheezing, cough and sputum production.  CARDIOVASCULAR: Denies chest pain, PND, orthopnea or reduced exercise tolerance.  ABDOMEN: Appetite fine. No weight loss. Denies diarrhea, abdominal pain, hematemesis or blood in stool.  URINARY: No flank pain, dysuria or hematuria.  PERIPHERAL VASCULAR: No claudication or cyanosis.  MUSCULOSKELETAL: See above.  NEUROLOGIC: No history of seizures, paralysis, alteration of gait or coordination.  PE:   HEAD: Normocephalic, atraumatic.EYES: PERRL. EOMI.   EARS: TM's intact. Light reflex normal. No retraction or perforation.   NOSE: Mucosa pink. Airway clear.MOUTH & THROAT: No tonsillar enlargement. No pharyngeal erythema or exudate. No stridor.  NODES: No cervical, axillary or inguinal lymph node enlargement.  CHEST: Lungs clear to auscultation.  CARDIOVASCULAR: Normal S1, S2. No rubs, murmurs or gallops.  ABDOMEN: Bowel sounds normal. Not distended. Soft. No tenderness or masses.  MUSCULOSKELETAL: No palpable abnormality  NEUROLOGIC: Cranial Nerves: II-XII grossly intact.  Motor: 5/5 strength major flexors/extensors.  DTR's: Knees, Ankles 2+ and equal bilaterally; downgoing toes.  Sensory: Intact to light touch distally.  Gait & Posture: Normal gait and fine motion. No cerebellar signs.      Impression:GAURANG  Fatigue  Plan:Lab eval reveiwed  Rec update sleep study   Rec diet and ex recs

## 2018-08-17 ENCOUNTER — TELEPHONE (OUTPATIENT)
Dept: FAMILY MEDICINE | Facility: CLINIC | Age: 68
End: 2018-08-17

## 2018-08-17 NOTE — TELEPHONE ENCOUNTER
----- Message from Anoop Tellez sent at 8/17/2018 10:33 AM CDT -----  Pt is requesting a call from nurse to discuss labs.      Please call pt back at [phone].        .  .    [facility]/pharmacy #[id number] - [place] - [address] [place] [address]  Phone: [phone] Fax: [phone]

## 2018-08-22 ENCOUNTER — PROCEDURE VISIT (OUTPATIENT)
Dept: SLEEP MEDICINE | Facility: CLINIC | Age: 68
End: 2018-08-22
Payer: MEDICARE

## 2018-08-22 DIAGNOSIS — G47.33 OSA (OBSTRUCTIVE SLEEP APNEA): ICD-10-CM

## 2018-08-22 PROCEDURE — 95806 SLEEP STUDY UNATT&RESP EFFT: CPT | Mod: S$GLB,,, | Performed by: INTERNAL MEDICINE

## 2018-08-22 PROCEDURE — 99499 UNLISTED E&M SERVICE: CPT | Mod: S$GLB,,, | Performed by: INTERNAL MEDICINE

## 2018-08-22 NOTE — PROCEDURES
Home Sleep Studies  Date/Time: 8/22/2018 8:55 AM  Performed by: Amandeep Selby MD  Authorized by: Cabrera Alan MD       Assessment and Recommendations  Adequate study. 7 hours 7 minutes  Saturation elian was 89%.  Average HR 67 BPM  Snoring above 50  for 96% time  Apnea Hypopnea Index: 17.2/hr ( 123 events)  MODERATE OBSTRUCTIVE SLEEP APNEA.  Treatment recommendations:  CPAP would be the guideline recommendation for first-line treatment for obstructive sleep apnea.  Either order in lab CPAP titration or AutoPAP device.  Follow-up evaluation and treatment in the sleep disorder clinic.  Please make referal  Other modalities for treatment of obstructive sleep apnea may be explored in patients with intolerant to CPAP  including evaluation by ear nose  and throat, mandibular advancement device, nonsupine sleep positioning device or Hypoglossal nerve pacemaker (  INSPIRE) .  Close follow-up to ensure resolution of symptoms.

## 2018-08-22 NOTE — Clinical Note
Assessment and RecommendationsAdequate study. 7 hours 7 minutesSaturation elian was 89%.Average HR 67 BPMSnoring above 50  for 96% timeApnea Hypopnea Index: 17.2/hr ( 123 events)MODERATE OBSTRUCTIVE SLEEP APNEA.Treatment recommendations:CPAP would be the guideline recommendation for first-line treatment for obstructive sleep apnea.Either order in lab CPAP titration or AutoPAP device.Follow-up evaluation and treatment in the sleep disorder clinic.Please make referalOther modalities for treatment of obstructive sleep apnea may be explored in patients with intolerant to CPAPincluding evaluation by ear noseand throat, mandibular advancement device, nonsupine sleep positioning device or Hypoglossal nerve pacemaker (INSPIRE) .Close follow-up to ensure resolution of symptoms.

## 2018-08-22 NOTE — PROGRESS NOTES
Assessment and Recommendations  Adequate study. 7 hours 7 minutes  Saturation elian was 89%.  Average HR 67 BPM  Snoring above 50  for 96% time  Apnea Hypopnea Index: 17.2/hr ( 123 events)  MODERATE OBSTRUCTIVE SLEEP APNEA.  Treatment recommendations:  CPAP would be the guideline recommendation for first-line treatment for obstructive sleep apnea.  Either order in lab CPAP titration or AutoPAP device.  Follow-up evaluation and treatment in the sleep disorder clinic.  Please make referal  Other modalities for treatment of obstructive sleep apnea may be explored in patients with intolerant to CPAP  including evaluation by ear nose  and throat, mandibular advancement device, nonsupine sleep positioning device or Hypoglossal nerve pacemaker (  INSPIRE) .  Close follow-up to ensure resolution of symptoms.

## 2018-09-18 ENCOUNTER — TELEPHONE (OUTPATIENT)
Dept: SLEEP MEDICINE | Facility: CLINIC | Age: 68
End: 2018-09-18

## 2018-09-18 ENCOUNTER — TELEPHONE (OUTPATIENT)
Dept: FAMILY MEDICINE | Facility: CLINIC | Age: 68
End: 2018-09-18

## 2018-09-18 NOTE — TELEPHONE ENCOUNTER
----- Message from Tamara Santoro sent at 9/18/2018  4:27 PM CDT -----  Contact: Self            Name of Who is Calling: Self      What is the request in detail: Pt had a home sleep study done on 08/22 and states he needs to know if he needs to come in for a consult prior to getting a prescription for a machine.      Can the clinic reply by [facility]: N      What Number to Call Back if not in [facility]: [phone]

## 2018-09-18 NOTE — TELEPHONE ENCOUNTER
----- Message from Aury Woodson sent at 9/18/2018 11:55 AM CDT -----  Contact: self  Pt is calling in regards to sleep study results. Pt sent machine back in by mail and hasn't heard anything yet. Pt was wondering what was going on with the sleep study. Please call back at [phone].    Thanks,   Aury Woodson

## 2018-09-18 NOTE — TELEPHONE ENCOUNTER
Pt had a home sleep study done on 08/22 and states he needs to know if he needs to come in for a consult prior to getting a prescription for a machine.  Informed patient that since he has never been seen in office, we would need to schedule an appointment.   Patient scheduled for 9/21.

## 2018-09-18 NOTE — TELEPHONE ENCOUNTER
Result MODERATE OBSTRUCTIVE SLEEP APNEA.  Treatment recommendations:Follow-up evaluation and treatment in the sleep disorder clinic for CPAP titration.

## 2018-09-21 ENCOUNTER — OFFICE VISIT (OUTPATIENT)
Dept: SLEEP MEDICINE | Facility: CLINIC | Age: 68
End: 2018-09-21
Payer: MEDICARE

## 2018-09-21 VITALS
WEIGHT: 218.31 LBS | BODY MASS INDEX: 30.56 KG/M2 | DIASTOLIC BLOOD PRESSURE: 88 MMHG | HEIGHT: 71 IN | SYSTOLIC BLOOD PRESSURE: 154 MMHG | HEART RATE: 79 BPM

## 2018-09-21 DIAGNOSIS — G47.33 OSA (OBSTRUCTIVE SLEEP APNEA): Primary | ICD-10-CM

## 2018-09-21 PROCEDURE — 99213 OFFICE O/P EST LOW 20 MIN: CPT | Mod: PBBFAC,PO | Performed by: PSYCHIATRY & NEUROLOGY

## 2018-09-21 PROCEDURE — 3079F DIAST BP 80-89 MM HG: CPT | Mod: CPTII,,, | Performed by: PSYCHIATRY & NEUROLOGY

## 2018-09-21 PROCEDURE — 3077F SYST BP >= 140 MM HG: CPT | Mod: CPTII,,, | Performed by: PSYCHIATRY & NEUROLOGY

## 2018-09-21 PROCEDURE — 99999 PR PBB SHADOW E&M-EST. PATIENT-LVL III: CPT | Mod: PBBFAC,,, | Performed by: PSYCHIATRY & NEUROLOGY

## 2018-09-21 PROCEDURE — 99204 OFFICE O/P NEW MOD 45 MIN: CPT | Mod: S$PBB,,, | Performed by: PSYCHIATRY & NEUROLOGY

## 2018-09-21 PROCEDURE — 1101F PT FALLS ASSESS-DOCD LE1/YR: CPT | Mod: CPTII,,, | Performed by: PSYCHIATRY & NEUROLOGY

## 2018-09-21 NOTE — PATIENT INSTRUCTIONS
PLAN:  Increase APAP 5-15 to 5-17  CPAP Comfort Cover (full face size) - company or online  Chin strap CPAP - [facility], or Access, or the one close to home  If no improvement in 3 months - will do CPAP titration

## 2018-09-21 NOTE — PROGRESS NOTES
Andrés Casillas  was seen at the request of  Self, Aaareferral for sleep evaluation.    09/21/2018 INITIAL HISTORY OF PRESENT ILLNESS:  Andrés Casillas is a 68 y.o. male is here to be evaluated for a sleep disorder.       CHIEF COMPLAINT:      The patient's complaints include excessive daytime sleepiness, excessive daytime fatigue, snoring,  witnessed breathing pauses,  gasping for air in sleep and interrupted sleep.  Diagnosed GAURANG many years ago.     Lost 40 lbs - now pressure feels too high and the mask is leaking since he lost weight from his face.    Had accusome - moderate GAURANG by 4A criteria; mild GAURANG by 4B criteria;     Using APAP  Resmed Airsence 5-15; 90% compliance; AHI 0.2; 90% pressure was 14.2    EPWORTH SLEEPINESS SCALE 9/21/2018   Sitting and reading 1   Watching TV 1   Sitting, inactive in a public place (e.g. a theatre or a meeting) 0   As a passenger in a car for an hour without a break 0   Lying down to rest in the afternoon when circumstances permit 3   Sitting and talking to someone 0   Sitting quietly after a lunch without alcohol 3   In a car, while stopped for a few minutes in traffic 0   Total score 8       SLEEP ROUTINE AND LIFESTYLE 09/21/2018 :  Sleep Clinic New Patient 9/21/2018   What time do you go to bed on a week day? (Give a range) 10 to 11 pm   What time do you go to bed on a day off? (Give a range) 10 to 11 pm   How long does it take you to fall asleep? (Give a range) Immediately   On average, how many times per night do you wake up? 5   How long does it take you to fall back into sleep? (Give a range) A few minutes   What time do you wake up to start your day on a week day? (Give a range) 7:30 am   What time do you wake up to start your day on a day off? (Give a range) 7:30 am   What time do you get out of bed? (Give a range) 8:30 am   On average, how many hours do you sleep? 7 to 9   On average, how many naps do you take per day? 0   Rate your sleep quality from 0 to 5  (0-poor, 5-great). 4   Have you experienced:  Weight loss?   How much weight have you lost or gained (in lbs.) in the last year? 40   On average, how many times per night do you go to the bathroom?  1 to 2   Have you ever had a sleep study/CPAP machine/surgery for sleep apnea? Yes   Have you ever had a CPAP machine for sleep apnea? Yes   Have you ever had surgery for sleep apnea? No       Sleep Clinic ROS  9/21/2018   Difficulty breathing through the nose?  Yes   Sore throat or dry mouth in the morning? Yes   Irregular or very fast heart beat?  No   Shortness of breath?  No   Acid reflux? No   Body aches and pains?  Yes   Morning headaches? No   Dizziness? Sometimes   Mood changes?  Yes   Do you exercise?  Sometimes   Do you feel like moving your legs a lot?  No          Denies symptoms concerning for parasomnia      The patient never had tonsillectomy, adenoidectomy or UPPP      Social History:      Occupation:  Bed partner:   Exercise routine:   Caffeine:  Coffee   , cokes  , tea       PREVIOUS SLEEP STUDIES:     HST 8/15/18: Significant Obstructive sleep apnea (GAURANG) with AHI (apnea hypopnea Index) of 17.2 and SaO2 of 89% .  Previous baseline N/a.      DME: [employer] - wants to switch      PAST MEDICAL HISTORY:    Active Ambulatory Problems     Diagnosis Date Noted    Hypertension     BPH (benign prostatic hypertrophy)     GAURANG (obstructive sleep apnea)      Resolved Ambulatory Problems     Diagnosis Date Noted    No Resolved Ambulatory Problems     Past Medical History:   Diagnosis Date    Anxiety     BPH (benign prostatic hypertrophy)     DDD (degenerative disc disease), lumbar     Hypertension     GAURANG (obstructive sleep apnea)                 PAST SURGICAL HISTORY:    Past Surgical History:   Procedure Laterality Date    JOINT REPLACEMENT           FAMILY HISTORY:                Family History   Problem Relation Age of Onset    Heart disease Mother     Cancer Mother     Heart disease Father      "Prostate cancer Brother        SOCIAL HISTORY:          Tobacco:   Social History     Tobacco Use   Smoking Status Never Smoker   Smokeless Tobacco Never Used       alcohol use:    Social History     Substance and Sexual Activity   Alcohol Use Yes    Alcohol/week: 1.0 oz    Types: 2 Standard drinks or equivalent per week    Comment: socially                   ALLERGIES:    Review of patient's allergies indicates:   Allergen Reactions    No known drug allergies        CURRENT MEDICATIONS:    Current Outpatient Medications   Medication Sig Dispense Refill    albuterol 90 mcg/actuation inhaler Inhale 2 puffs into the lungs every 6 (six) hours as needed for Wheezing. Rescue 18 g 4    ALPRAZolam (XANAX) 1 MG tablet Take 1 tablet (1 mg total) by mouth 3 (three) times daily. 90 tablet 5    furosemide (LASIX) 40 MG tablet 40 mg daily as needed.   3    tamsulosin (FLOMAX) 0.4 mg Cp24        No current facility-administered medications for this visit.                         PHYSICAL EXAM:  BP (!) 154/88   Pulse 79   Ht 5' 11" (1.803 m)   Wt 99 kg (218 lb 4.8 oz)   BMI 30.45 kg/m²   GENERAL: Overweight body habitus, well groomed.  HEENT:   HEENT:  Conjunctivae are non-erythematous; Pupils equal, round, and reactive to light; Nose is symmetrical; Nasal mucosa is pink and moist; Septum is midline; Inferior turbinates are normal; Nasal airflow is normal; Posterior pharynx is pink; Modified Mallampati:III-IV; Posterior palate is low; Tonsils not visualized; Uvula is wide and elongated;Tongue is enlarged; Dentition is fair; No TMJ tenderness; Jaw opening and protrusion without click and without discomfort.  NECK: Supple. Neck circumference is 16 inches. No thyromegaly. No palpable nodes.     SKIN: On face and neck: No abrasions, no rashes, no lesions.  No subcutaneous nodules are palpable.  RESPIRATORY: Chest is clear to auscultation.  Normal chest expansion and non-labored breathing at rest.  CARDIOVASCULAR: Normal S1, " "S2.  No murmurs, gallops or rubs. No carotid bruits bilaterally.  No edema. No clubbing. No cyanosis.    NEURO: Oriented to time, place and person. Normal attention span and concentration. Gait normal.    PSYCH: Affect is full. Mood is normal  MUSCULOSKELETAL: Moves 4 extremities. Gait normal.         Using [facility]: no    ASSESSMENT:    1. GAURANG (obstructive sleep apnea). The patient symptomatically has  excessive daytime sleepiness, snoring,  witnessed breathing pauses, excessive daytime fatigue, gasping for air in sleep, interrupted sleep and nocturia  with exam findings of "a crowded oral airway and elevated body mass index. The patient has medical co-morbidities of hypertension,  which can be worsened by GAURANG. This warrants further investigation for possible obstructive sleep apnea.          PLAN:  Increase APAP 5-15 to 5-17  CPAP Comfort Cover (full face size) - company or online  Chin strap CPAP - [facility], or [facility], or the one close to home    More than 15 minutes of this 30 minutes visit was spent in counseling: during our discussion today, we talked about the etiology of  GAURANG as well as the potential ramifications of untreated sleep apnea, which could include daytime sleepiness, hypertension, heart disease and/or stroke.  We discussed potential treatment options, which could include weight loss, body positioning, continuous positive airway pressure (CPAP), or referral for surgical consideration. Meanwhile, he  is urged to avoid supine sleep, weight gain and alcoholic beverages since all of these can worsen GAURANG.     Precautions: The patient was advised to abstain from driving should he feel sleepy or drowsy.    Follow up: MD/NP  after the sleep study has been completed.     Thank you for allowing me the opportunity to participate in the care of your patient.    This visit summary will be sent to referring provider via inbasket        "

## 2019-02-05 ENCOUNTER — TELEPHONE (OUTPATIENT)
Dept: SLEEP MEDICINE | Facility: CLINIC | Age: 69
End: 2019-02-05

## 2019-02-05 NOTE — TELEPHONE ENCOUNTER
----- Message from Vijaya Saunders sent at 2/5/2019 10:14 AM CST -----  Type:  Patient Returning Call    Who Called: NACHO PATRICK [[id number]]    Who Left Message for Patient: Sandra    Does the patient know what this is regarding?:Pt was told to reschedule his appt. Next avail is April. Please call to get in sooner.     Best Call Back Number:[phone]

## 2019-02-05 NOTE — TELEPHONE ENCOUNTER
Mr Sonja states he wants to be seen in [place] and only by Dr. Santacruz. He was apparently scheduled to be seen in December for his yearly visit but we called and rescheduled for tomorrow.

## 2019-02-05 NOTE — TELEPHONE ENCOUNTER
Mr. Casillas is scheduled to see Dr. Santacruz tomorrow in [place] at 11:30. She will not be available at that time.    Left vmail asking for call back to reschedule

## 2019-02-14 ENCOUNTER — TELEPHONE (OUTPATIENT)
Dept: FAMILY MEDICINE | Facility: CLINIC | Age: 69
End: 2019-02-14

## 2019-02-14 RX ORDER — ALPRAZOLAM 1 MG/1
1 TABLET ORAL 3 TIMES DAILY
Qty: 90 TABLET | Refills: 5 | Status: SHIPPED | OUTPATIENT
Start: 2019-02-14 | End: 2019-08-12 | Stop reason: SDUPTHER

## 2019-02-14 NOTE — TELEPHONE ENCOUNTER
----- Message from Coco Fitzgerald sent at 2/14/2019 10:22 AM CST -----  Contact: self  Type:  RX Refill Request    Who Called: pt  Refill or New Rx:refill  RX Name and Strength:alprazolam-1mg  How is the patient currently taking it? (ex. 1XDay):1xday  Is this a 30 day or 90 day RX:n/a  Preferred Pharmacy with phone number:  [facility] [address]  [address] [address]  Phone: [phone] Fax: [phone]  Local or Mail Order:local  Ordering Provider:dr. verdugo  Would the patient rather a call back or a response via [facility]? Call back  Best Call Back Number:[phone]  Additional Information: none    Thanks,  Coco Fitzgerald

## 2019-02-14 NOTE — TELEPHONE ENCOUNTER
I called the pharmacy pt had a refill but it is , can pt have 1 refill until his appt in march. Last refill was #90 on 19

## 2019-03-13 ENCOUNTER — OFFICE VISIT (OUTPATIENT)
Dept: SLEEP MEDICINE | Facility: CLINIC | Age: 69
End: 2019-03-13
Payer: MEDICARE

## 2019-03-13 VITALS
HEIGHT: 71 IN | WEIGHT: 226.31 LBS | HEART RATE: 72 BPM | SYSTOLIC BLOOD PRESSURE: 136 MMHG | DIASTOLIC BLOOD PRESSURE: 87 MMHG | BODY MASS INDEX: 31.68 KG/M2

## 2019-03-13 DIAGNOSIS — G47.33 OSA (OBSTRUCTIVE SLEEP APNEA): Primary | ICD-10-CM

## 2019-03-13 PROCEDURE — 1101F PR PT FALLS ASSESS DOC 0-1 FALLS W/OUT INJ PAST YR: ICD-10-PCS | Mod: CPTII,S$GLB,, | Performed by: PSYCHIATRY & NEUROLOGY

## 2019-03-13 PROCEDURE — 3079F DIAST BP 80-89 MM HG: CPT | Mod: CPTII,S$GLB,, | Performed by: PSYCHIATRY & NEUROLOGY

## 2019-03-13 PROCEDURE — 3075F PR MOST RECENT SYSTOLIC BLOOD PRESS GE 130-139MM HG: ICD-10-PCS | Mod: CPTII,S$GLB,, | Performed by: PSYCHIATRY & NEUROLOGY

## 2019-03-13 PROCEDURE — 99999 PR PBB SHADOW E&M-EST. PATIENT-LVL III: CPT | Mod: PBBFAC,,, | Performed by: PSYCHIATRY & NEUROLOGY

## 2019-03-13 PROCEDURE — 99214 OFFICE O/P EST MOD 30 MIN: CPT | Mod: S$GLB,,, | Performed by: PSYCHIATRY & NEUROLOGY

## 2019-03-13 PROCEDURE — 1101F PT FALLS ASSESS-DOCD LE1/YR: CPT | Mod: CPTII,S$GLB,, | Performed by: PSYCHIATRY & NEUROLOGY

## 2019-03-13 PROCEDURE — 99999 PR PBB SHADOW E&M-EST. PATIENT-LVL III: ICD-10-PCS | Mod: PBBFAC,,, | Performed by: PSYCHIATRY & NEUROLOGY

## 2019-03-13 PROCEDURE — 99214 PR OFFICE/OUTPT VISIT, EST, LEVL IV, 30-39 MIN: ICD-10-PCS | Mod: S$GLB,,, | Performed by: PSYCHIATRY & NEUROLOGY

## 2019-03-13 PROCEDURE — 3075F SYST BP GE 130 - 139MM HG: CPT | Mod: CPTII,S$GLB,, | Performed by: PSYCHIATRY & NEUROLOGY

## 2019-03-13 PROCEDURE — 3079F PR MOST RECENT DIASTOLIC BLOOD PRESSURE 80-89 MM HG: ICD-10-PCS | Mod: CPTII,S$GLB,, | Performed by: PSYCHIATRY & NEUROLOGY

## 2019-03-13 RX ORDER — CELECOXIB 200 MG/1
200 CAPSULE ORAL 2 TIMES DAILY
Refills: 2 | COMMUNITY
Start: 2019-01-16 | End: 2019-09-19

## 2019-03-14 ENCOUNTER — OFFICE VISIT (OUTPATIENT)
Dept: FAMILY MEDICINE | Facility: CLINIC | Age: 69
End: 2019-03-14
Payer: MEDICARE

## 2019-03-14 VITALS
HEART RATE: 73 BPM | SYSTOLIC BLOOD PRESSURE: 136 MMHG | DIASTOLIC BLOOD PRESSURE: 85 MMHG | TEMPERATURE: 99 F | WEIGHT: 227 LBS | BODY MASS INDEX: 31.78 KG/M2 | HEIGHT: 71 IN

## 2019-03-14 DIAGNOSIS — M17.11 OSTEOARTHRITIS OF RIGHT KNEE, UNSPECIFIED OSTEOARTHRITIS TYPE: Primary | ICD-10-CM

## 2019-03-14 PROCEDURE — 99999 PR PBB SHADOW E&M-EST. PATIENT-LVL III: ICD-10-PCS | Mod: PBBFAC,,, | Performed by: FAMILY MEDICINE

## 2019-03-14 PROCEDURE — 99999 PR PBB SHADOW E&M-EST. PATIENT-LVL III: CPT | Mod: PBBFAC,,, | Performed by: FAMILY MEDICINE

## 2019-03-14 PROCEDURE — 3079F DIAST BP 80-89 MM HG: CPT | Mod: CPTII,S$GLB,, | Performed by: FAMILY MEDICINE

## 2019-03-14 PROCEDURE — 99213 PR OFFICE/OUTPT VISIT, EST, LEVL III, 20-29 MIN: ICD-10-PCS | Mod: S$GLB,,, | Performed by: FAMILY MEDICINE

## 2019-03-14 PROCEDURE — 3079F PR MOST RECENT DIASTOLIC BLOOD PRESSURE 80-89 MM HG: ICD-10-PCS | Mod: CPTII,S$GLB,, | Performed by: FAMILY MEDICINE

## 2019-03-14 PROCEDURE — 3075F SYST BP GE 130 - 139MM HG: CPT | Mod: CPTII,S$GLB,, | Performed by: FAMILY MEDICINE

## 2019-03-14 PROCEDURE — 99213 OFFICE O/P EST LOW 20 MIN: CPT | Mod: S$GLB,,, | Performed by: FAMILY MEDICINE

## 2019-03-14 PROCEDURE — 1101F PR PT FALLS ASSESS DOC 0-1 FALLS W/OUT INJ PAST YR: ICD-10-PCS | Mod: CPTII,S$GLB,, | Performed by: FAMILY MEDICINE

## 2019-03-14 PROCEDURE — 3075F PR MOST RECENT SYSTOLIC BLOOD PRESS GE 130-139MM HG: ICD-10-PCS | Mod: CPTII,S$GLB,, | Performed by: FAMILY MEDICINE

## 2019-03-14 PROCEDURE — 1101F PT FALLS ASSESS-DOCD LE1/YR: CPT | Mod: CPTII,S$GLB,, | Performed by: FAMILY MEDICINE

## 2019-03-25 ENCOUNTER — OFFICE VISIT (OUTPATIENT)
Dept: UROLOGY | Facility: CLINIC | Age: 69
End: 2019-03-25
Payer: MEDICARE

## 2019-03-25 ENCOUNTER — LAB VISIT (OUTPATIENT)
Dept: LAB | Facility: HOSPITAL | Age: 69
End: 2019-03-25
Attending: UROLOGY
Payer: MEDICARE

## 2019-03-25 VITALS — BODY MASS INDEX: 31.64 KG/M2 | HEIGHT: 71 IN | WEIGHT: 226 LBS

## 2019-03-25 DIAGNOSIS — Z12.5 SCREENING FOR PROSTATE CANCER: ICD-10-CM

## 2019-03-25 DIAGNOSIS — N13.8 ENLARGED PROSTATE WITH URINARY OBSTRUCTION: ICD-10-CM

## 2019-03-25 DIAGNOSIS — E29.1 MALE HYPOGONADISM: ICD-10-CM

## 2019-03-25 DIAGNOSIS — E29.1 MALE HYPOGONADISM: Primary | ICD-10-CM

## 2019-03-25 DIAGNOSIS — N40.1 ENLARGED PROSTATE WITH URINARY OBSTRUCTION: ICD-10-CM

## 2019-03-25 LAB
BILIRUB SERPL-MCNC: NORMAL MG/DL
BLOOD URINE, POC: NORMAL
COLOR, POC UA: YELLOW
COMPLEXED PSA SERPL-MCNC: 2.5 NG/ML (ref 0–4)
GLUCOSE UR QL STRIP: NORMAL
HCT VFR BLD AUTO: 53.8 % (ref 40–54)
HGB BLD-MCNC: 18.2 G/DL (ref 14–18)
KETONES UR QL STRIP: NORMAL
LEUKOCYTE ESTERASE URINE, POC: NORMAL
NITRITE, POC UA: NORMAL
PH, POC UA: 7
PROTEIN, POC: NORMAL
SPECIFIC GRAVITY, POC UA: 1.01
TESTOST SERPL-MCNC: 1323 NG/DL (ref 304–1227)
UROBILINOGEN, POC UA: NORMAL

## 2019-03-25 PROCEDURE — 99204 OFFICE O/P NEW MOD 45 MIN: CPT | Mod: 25,S$GLB,, | Performed by: UROLOGY

## 2019-03-25 PROCEDURE — 99999 PR PBB SHADOW E&M-EST. PATIENT-LVL II: ICD-10-PCS | Mod: PBBFAC,,, | Performed by: UROLOGY

## 2019-03-25 PROCEDURE — 1101F PT FALLS ASSESS-DOCD LE1/YR: CPT | Mod: CPTII,S$GLB,, | Performed by: UROLOGY

## 2019-03-25 PROCEDURE — 36415 COLL VENOUS BLD VENIPUNCTURE: CPT | Mod: PO

## 2019-03-25 PROCEDURE — 84403 ASSAY OF TOTAL TESTOSTERONE: CPT

## 2019-03-25 PROCEDURE — 99204 PR OFFICE/OUTPT VISIT, NEW, LEVL IV, 45-59 MIN: ICD-10-PCS | Mod: 25,S$GLB,, | Performed by: UROLOGY

## 2019-03-25 PROCEDURE — 99999 PR PBB SHADOW E&M-EST. PATIENT-LVL II: CPT | Mod: PBBFAC,,, | Performed by: UROLOGY

## 2019-03-25 PROCEDURE — 81002 POCT URINE DIPSTICK WITHOUT MICROSCOPE: ICD-10-PCS | Mod: S$GLB,,, | Performed by: UROLOGY

## 2019-03-25 PROCEDURE — 85018 HEMOGLOBIN: CPT

## 2019-03-25 PROCEDURE — 84153 ASSAY OF PSA TOTAL: CPT

## 2019-03-25 PROCEDURE — 81002 URINALYSIS NONAUTO W/O SCOPE: CPT | Mod: S$GLB,,, | Performed by: UROLOGY

## 2019-03-25 PROCEDURE — 85014 HEMATOCRIT: CPT

## 2019-03-25 PROCEDURE — 1101F PR PT FALLS ASSESS DOC 0-1 FALLS W/OUT INJ PAST YR: ICD-10-PCS | Mod: CPTII,S$GLB,, | Performed by: UROLOGY

## 2019-03-25 NOTE — PROGRESS NOTES
"Subjective:       Patient ID: Andrés Casillas is a 68 y.o. male.    Chief Complaint: Annual Exam and Low Testosterone    HPI     68 year old here for annual exam.  He has previously been seen by Dr. Luna.   He has been on testosterone replacement.  Timeless Rx.  He is on testosterone injection as well as Hcg Injections.  He injects 2 times per week.    He has no bothersome voiding complaints.  He has urinary frequency and takes Flomax but not always consistently.  His flow is ok.  He has low back pain "prostatitis" when he drinks too much caffeine.   Brother had prostate cancer.  Previous PSA have been in normal range.  Urine dipstick shows negative for all components.    Past Medical History:   Diagnosis Date    Anxiety     BPH (benign prostatic hypertrophy)     DDD (degenerative disc disease), lumbar     Hypertension     GAURANG (obstructive sleep apnea)      Past Surgical History:   Procedure Laterality Date    JOINT REPLACEMENT         Current Outpatient Medications:     albuterol 90 mcg/actuation inhaler, Inhale 2 puffs into the lungs every 6 (six) hours as needed for Wheezing. Rescue, Disp: 18 g, Rfl: 4    ALPRAZolam (XANAX) 1 MG tablet, Take 1 tablet (1 mg total) by mouth 3 (three) times daily., Disp: 90 tablet, Rfl: 5    celecoxib (CELEBREX) 200 MG capsule, Take 200 mg by mouth 2 (two) times daily., Disp: , Rfl: 2    furosemide (LASIX) 40 MG tablet, 40 mg daily as needed. , Disp: , Rfl: 3    tamsulosin (FLOMAX) 0.4 mg Cp24, , Disp: , Rfl:       Review of Systems   Constitutional: Negative for fever.   Eyes: Negative for visual disturbance.   Respiratory: Negative for shortness of breath.    Cardiovascular: Negative for chest pain.   Gastrointestinal: Negative for nausea and vomiting.   Genitourinary: Positive for frequency. Negative for dysuria and hematuria.   Musculoskeletal: Negative for gait problem.   Skin: Negative for rash.   Neurological: Negative for seizures.   Psychiatric/Behavioral: " Negative for confusion.       Objective:      Physical Exam   Constitutional: He is oriented to person, place, and time. He appears well-developed and well-nourished.   HENT:   Head: Normocephalic and atraumatic.   Eyes: Conjunctivae are normal.   Cardiovascular: Normal rate.   Pulmonary/Chest: Effort normal.   Abdominal: Hernia confirmed negative in the right inguinal area and confirmed negative in the left inguinal area.   Genitourinary: Testes normal and penis normal. Rectal exam shows no mass and anal tone normal. Prostate is enlarged (40g, s/s/a). Prostate is not tender.   Musculoskeletal: Normal range of motion.   Neurological: He is alert and oriented to person, place, and time.   Skin: Skin is warm and dry. No rash noted.   Psychiatric: He has a normal mood and affect.   Vitals reviewed.      Assessment:       1. Male hypogonadism    2. Enlarged prostate with urinary obstruction    3. Screening for prostate cancer        Plan:       Male hypogonadism  -     POCT urine dipstick without microscope  -     Testosterone; Future; Expected date: 03/25/2019  -     Hematocrit; Future; Expected date: 03/25/2019  -     Hemoglobin; Future; Expected date: 03/25/2019    Enlarged prostate with urinary obstruction    Screening for prostate cancer  -     PSA, Screening; Future; Expected date: 03/25/2019

## 2019-04-01 ENCOUNTER — TELEPHONE (OUTPATIENT)
Dept: UROLOGY | Facility: CLINIC | Age: 69
End: 2019-04-01

## 2019-04-01 NOTE — TELEPHONE ENCOUNTER
Notified pt of lab results and mailed as requested. Also advised on giving blood on a regular basis to help keep h&h in normal range.

## 2019-04-01 NOTE — TELEPHONE ENCOUNTER
----- Message from RT Gary sent at 4/1/2019 10:06 AM CDT -----  Contact: pt   pt  , returned missed call, called amanda barnett.

## 2019-04-02 RX ORDER — ALBUTEROL SULFATE 90 UG/1
AEROSOL, METERED RESPIRATORY (INHALATION)
Qty: 18 INHALER | Refills: 2 | Status: SHIPPED | OUTPATIENT
Start: 2019-04-02 | End: 2019-12-08 | Stop reason: SDUPTHER

## 2019-06-11 ENCOUNTER — OFFICE VISIT (OUTPATIENT)
Dept: FAMILY MEDICINE | Facility: CLINIC | Age: 69
End: 2019-06-11
Payer: MEDICARE

## 2019-06-11 ENCOUNTER — PATIENT OUTREACH (OUTPATIENT)
Dept: ADMINISTRATIVE | Facility: HOSPITAL | Age: 69
End: 2019-06-11

## 2019-06-11 VITALS
RESPIRATION RATE: 16 BRPM | BODY MASS INDEX: 31.52 KG/M2 | DIASTOLIC BLOOD PRESSURE: 80 MMHG | WEIGHT: 226 LBS | HEART RATE: 80 BPM | TEMPERATURE: 98 F | SYSTOLIC BLOOD PRESSURE: 136 MMHG

## 2019-06-11 DIAGNOSIS — G47.33 OSA (OBSTRUCTIVE SLEEP APNEA): ICD-10-CM

## 2019-06-11 DIAGNOSIS — I10 ESSENTIAL HYPERTENSION: ICD-10-CM

## 2019-06-11 DIAGNOSIS — Z01.818 PRE-OPERATIVE CLEARANCE: Primary | ICD-10-CM

## 2019-06-11 PROCEDURE — 1101F PR PT FALLS ASSESS DOC 0-1 FALLS W/OUT INJ PAST YR: ICD-10-PCS | Mod: CPTII,S$GLB,, | Performed by: FAMILY MEDICINE

## 2019-06-11 PROCEDURE — 99999 PR PBB SHADOW E&M-EST. PATIENT-LVL II: ICD-10-PCS | Mod: PBBFAC,,, | Performed by: FAMILY MEDICINE

## 2019-06-11 PROCEDURE — 3079F PR MOST RECENT DIASTOLIC BLOOD PRESSURE 80-89 MM HG: ICD-10-PCS | Mod: CPTII,S$GLB,, | Performed by: FAMILY MEDICINE

## 2019-06-11 PROCEDURE — 1101F PT FALLS ASSESS-DOCD LE1/YR: CPT | Mod: CPTII,S$GLB,, | Performed by: FAMILY MEDICINE

## 2019-06-11 PROCEDURE — 99999 PR PBB SHADOW E&M-EST. PATIENT-LVL II: CPT | Mod: PBBFAC,,, | Performed by: FAMILY MEDICINE

## 2019-06-11 PROCEDURE — 3075F PR MOST RECENT SYSTOLIC BLOOD PRESS GE 130-139MM HG: ICD-10-PCS | Mod: CPTII,S$GLB,, | Performed by: FAMILY MEDICINE

## 2019-06-11 PROCEDURE — 99214 OFFICE O/P EST MOD 30 MIN: CPT | Mod: S$GLB,,, | Performed by: FAMILY MEDICINE

## 2019-06-11 PROCEDURE — 99214 PR OFFICE/OUTPT VISIT, EST, LEVL IV, 30-39 MIN: ICD-10-PCS | Mod: S$GLB,,, | Performed by: FAMILY MEDICINE

## 2019-06-11 PROCEDURE — 3079F DIAST BP 80-89 MM HG: CPT | Mod: CPTII,S$GLB,, | Performed by: FAMILY MEDICINE

## 2019-06-11 PROCEDURE — 3075F SYST BP GE 130 - 139MM HG: CPT | Mod: CPTII,S$GLB,, | Performed by: FAMILY MEDICINE

## 2019-06-11 NOTE — PROGRESS NOTES
The patient presents today for pre op consult requested by Dr. Tyshawn Fang anticipating right TKR with a long history of DJD.    Past Medical History:  Past Medical History:   Diagnosis Date    Anxiety     BPH (benign prostatic hypertrophy)     DDD (degenerative disc disease), lumbar     Hypertension     GAURANG (obstructive sleep apnea)      Past Surgical History:   Procedure Laterality Date    JOINT REPLACEMENT       Review of patient's allergies indicates:   Allergen Reactions    No known drug allergies      Current Outpatient Medications on File Prior to Visit   Medication Sig Dispense Refill    ALPRAZolam (XANAX) 1 MG tablet Take 1 tablet (1 mg total) by mouth 3 (three) times daily. 90 tablet 5    celecoxib (CELEBREX) 200 MG capsule Take 200 mg by mouth 2 (two) times daily.  2    furosemide (LASIX) 40 MG tablet 40 mg daily as needed.   3    tamsulosin (FLOMAX) 0.4 mg Cp24       VENTOLIN HFA 90 mcg/actuation inhaler INHALE 2 PUFFS BY MOUTH EVERY 6 HOURS AS NEEDED FOR WHEEZE. RESCUE 18 Inhaler 2     No current facility-administered medications on file prior to visit.      Social History     Socioeconomic History    Marital status: Single     Spouse name: Not on file    Number of children: Not on file    Years of education: Not on file    Highest education level: Not on file   Occupational History     Employer: [employer]   Social Needs    Financial resource strain: Not on file    Food insecurity:     Worry: Not on file     Inability: Not on file    Transportation needs:     Medical: Not on file     Non-medical: Not on file   Tobacco Use    Smoking status: Never Smoker    Smokeless tobacco: Never Used   Substance and Sexual Activity    Alcohol use: Yes     Alcohol/week: 1.0 oz     Types: 2 Standard drinks or equivalent per week     Comment: socially    Drug use: No    Sexual activity: Yes     Partners: Female   Lifestyle    Physical activity:     Days per week: Not on file     Minutes per  session: Not on file    Stress: Not on file   Relationships    Social connections:     Talks on phone: Not on file     Gets together: Not on file     Attends [employer] service: Not on file     Active member of club or organization: Not on file     Attends meetings of clubs or organizations: Not on file     Relationship status: Not on file   Other Topics Concern    Not on file   Social History Narrative    ** Merged History Encounter **          Family History   Problem Relation Age of Onset    Heart disease Mother     Cancer Mother     Heart disease Father     Prostate cancer Brother          ROS:GENERAL: No fever, chills, fatigability or weight loss.  SKIN: No rashes, itching or changes in color or texture of skin.  HEAD: No headaches or recent head trauma.EYES: Visual acuity fine. No photophobia, ocular pain or diplopia.EARS: Denies ear pain, discharge or vertigo.NOSE: No loss of smell, no epistaxis or postnasal drip.MOUTH & THROAT: No hoarseness or change in voice. No excessive gum bleeding.NODES: Denies swollen glands.  CHEST: Denies RIVAS, cyanosis, wheezing, cough and sputum production.  CARDIOVASCULAR: Denies chest pain, PND, orthopnea or reduced exercise tolerance.  ABDOMEN: Appetite fine. No weight loss. Denies diarrhea, abdominal pain, hematemesis or blood in stool.  URINARY: No flank pain, dysuria or hematuria.  PERIPHERAL VASCULAR: No claudication or cyanosis.  MUSCULOSKELETAL: See above.  NEUROLOGIC: No history of seizures, paralysis, alteration of gait or coordination.  PE:    HEAD: Normocephalic, atraumatic.EYES: PERRL. EOMI.   EARS: TM's intact. Light reflex normal. No retraction or perforation.   NOSE: Mucosa pink. Airway clear.MOUTH & THROAT: No tonsillar enlargement. No pharyngeal erythema or exudate. No stridor.  NODES: No cervical, axillary or inguinal lymph node enlargement.  CHEST: Lungs clear to auscultation.  CARDIOVASCULAR: Normal S1, S2. No rubs, murmurs or gallops.  ABDOMEN: Bowel  sounds normal. Not distended. Soft. No tenderness or masses.  MUSCULOSKELETAL: Gen degenerative changes and tenderness medial/lateral knee.   NEUROLOGIC: Cranial Nerves: II-XII grossly intact.  Motor: 5/5 strength major flexors/extensors.  DTR's: Knees, Ankles 2+ and equal bilaterally; downgoing toes.  Sensory: Intact to light touch distally.  Gait & Posture: Normal gait and fine motion. No cerebellar signs.     Impression:DJD  Plan:Lab eval rev, CXR clear  EKG RBB RAHB-no acute changes   Clear for surgery

## 2019-07-05 ENCOUNTER — TELEPHONE (OUTPATIENT)
Dept: FAMILY MEDICINE | Facility: CLINIC | Age: 69
End: 2019-07-05

## 2019-07-05 NOTE — TELEPHONE ENCOUNTER
Patient states he is taking, mobic, asa, gabapentin, and tramadol s/p knee surgery, attempted to call surgeon to report stomach upset from medication, was told to call pcp, has nexium but didn't want to take without permission, patient has stopped all med for this reason, please advise, patient will try zantac OTC.

## 2019-07-05 NOTE — TELEPHONE ENCOUNTER
----- Message from Janel Ly sent at 7/5/2019 11:14 AM CDT -----  Contact: Patient   Pt requesting a call back regarding if he should stop taking medication and stomach pain he is experiencing because of the medication.Please call back at [phone].    Thanks,  Janel Ly

## 2019-08-11 NOTE — PROGRESS NOTES
The patient presents today to discuss pending Rt TKR w progressive pain and debility.    Past Medical History:  Past Medical History:   Diagnosis Date    Anxiety     BPH (benign prostatic hypertrophy)     DDD (degenerative disc disease), lumbar     Hypertension     GAURANG (obstructive sleep apnea)      Past Surgical History:   Procedure Laterality Date    JOINT REPLACEMENT       Review of patient's allergies indicates:   Allergen Reactions    No known drug allergies      Current Outpatient Medications on File Prior to Visit   Medication Sig Dispense Refill    albuterol 90 mcg/actuation inhaler Inhale 2 puffs into the lungs every 6 (six) hours as needed for Wheezing. Rescue 18 g 4    ALPRAZolam (XANAX) 1 MG tablet Take 1 tablet (1 mg total) by mouth 3 (three) times daily. 90 tablet 5    celecoxib (CELEBREX) 200 MG capsule Take 200 mg by mouth 2 (two) times daily.  2    furosemide (LASIX) 40 MG tablet 40 mg daily as needed.   3    tamsulosin (FLOMAX) 0.4 mg Cp24        No current facility-administered medications on file prior to visit.      Social History     Socioeconomic History    Marital status: Single     Spouse name: Not on file    Number of children: Not on file    Years of education: Not on file    Highest education level: Not on file   Social Needs    Financial resource strain: Not on file    Food insecurity - worry: Not on file    Food insecurity - inability: Not on file    Transportation needs - medical: Not on file    Transportation needs - non-medical: Not on file   Occupational History     Employer: [employer]   Tobacco Use    Smoking status: Never Smoker    Smokeless tobacco: Never Used   Substance and Sexual Activity    Alcohol use: Yes     Alcohol/week: 1.0 oz     Types: 2 Standard drinks or equivalent per week     Comment: socially    Drug use: No    Sexual activity: Yes     Partners: Female   Other Topics Concern    Not on file   Social History Narrative    ** Merged  History Encounter **          Family History   Problem Relation Age of Onset    Heart disease Mother     Cancer Mother     Heart disease Father     Prostate cancer Brother          ROS:GENERAL: No fever, chills, fatigability or weight loss.  SKIN: No rashes, itching or changes in color or texture of skin.  HEAD: No headaches or recent head trauma.EYES: Visual acuity fine. No photophobia, ocular pain or diplopia.EARS: Denies ear pain, discharge or vertigo.NOSE: No loss of smell, no epistaxis or postnasal drip.MOUTH & THROAT: No hoarseness or change in voice. No excessive gum bleeding.NODES: Denies swollen glands.  CHEST: Denies RIVAS, cyanosis, wheezing, cough and sputum production.  CARDIOVASCULAR: Denies chest pain, PND, orthopnea or reduced exercise tolerance.  ABDOMEN: Appetite fine. No weight loss. Denies diarrhea, abdominal pain, hematemesis or blood in stool.  URINARY: No flank pain, dysuria or hematuria.  PERIPHERAL VASCULAR: No claudication or cyanosis.  MUSCULOSKELETAL: See above.  NEUROLOGIC: No history of seizures, paralysis, alteration of gait or coordination.  PE:   HEAD: Normocephalic, atraumatic.EYES: PERRL. EOMI.   EARS: TM's intact. Light reflex normal. No retraction or perforation.   NOSE: Mucosa pink. Airway clear.MOUTH & THROAT: No tonsillar enlargement. No pharyngeal erythema or exudate. No stridor.  NODES: No cervical, axillary or inguinal lymph node enlargement.  CHEST: Lungs clear to auscultation.  CARDIOVASCULAR: Normal S1, S2. No rubs, murmurs or gallops.  ABDOMEN: Bowel sounds normal. Not distended. Soft. No tenderness or masses.  MUSCULOSKELETAL:Mod degen changes and pain to palp and rom bilateral knees  NEUROLOGIC: Cranial Nerves: II-XII grossly intact.  Motor: 5/5 strength major flexors/extensors.  DTR's: Knees, Ankles 2+ and equal bilaterally; downgoing toes.  Sensory: Intact to light touch distally.  Gait & Posture: Normal gait and fine motion. No cerebellar signs.      Impression:MARGRET   Plan: Rec consider TKR   Clear for surgery    11-Aug-2019 09:51

## 2019-08-12 ENCOUNTER — OFFICE VISIT (OUTPATIENT)
Dept: FAMILY MEDICINE | Facility: CLINIC | Age: 69
End: 2019-08-12
Payer: MEDICARE

## 2019-08-12 VITALS
HEIGHT: 71 IN | BODY MASS INDEX: 29.93 KG/M2 | SYSTOLIC BLOOD PRESSURE: 136 MMHG | WEIGHT: 213.81 LBS | DIASTOLIC BLOOD PRESSURE: 79 MMHG | TEMPERATURE: 98 F | HEART RATE: 84 BPM

## 2019-08-12 DIAGNOSIS — F41.9 ANXIETY: ICD-10-CM

## 2019-08-12 DIAGNOSIS — I10 ESSENTIAL HYPERTENSION: ICD-10-CM

## 2019-08-12 DIAGNOSIS — M19.90 OSTEOARTHRITIS, UNSPECIFIED OSTEOARTHRITIS TYPE, UNSPECIFIED SITE: Primary | ICD-10-CM

## 2019-08-12 PROCEDURE — 1101F PT FALLS ASSESS-DOCD LE1/YR: CPT | Mod: CPTII,S$GLB,, | Performed by: FAMILY MEDICINE

## 2019-08-12 PROCEDURE — 3078F PR MOST RECENT DIASTOLIC BLOOD PRESSURE < 80 MM HG: ICD-10-PCS | Mod: CPTII,S$GLB,, | Performed by: FAMILY MEDICINE

## 2019-08-12 PROCEDURE — 99999 PR PBB SHADOW E&M-EST. PATIENT-LVL III: ICD-10-PCS | Mod: PBBFAC,,, | Performed by: FAMILY MEDICINE

## 2019-08-12 PROCEDURE — 99213 PR OFFICE/OUTPT VISIT, EST, LEVL III, 20-29 MIN: ICD-10-PCS | Mod: S$GLB,,, | Performed by: FAMILY MEDICINE

## 2019-08-12 PROCEDURE — 1101F PR PT FALLS ASSESS DOC 0-1 FALLS W/OUT INJ PAST YR: ICD-10-PCS | Mod: CPTII,S$GLB,, | Performed by: FAMILY MEDICINE

## 2019-08-12 PROCEDURE — 99213 OFFICE O/P EST LOW 20 MIN: CPT | Mod: S$GLB,,, | Performed by: FAMILY MEDICINE

## 2019-08-12 PROCEDURE — 3078F DIAST BP <80 MM HG: CPT | Mod: CPTII,S$GLB,, | Performed by: FAMILY MEDICINE

## 2019-08-12 PROCEDURE — 3075F PR MOST RECENT SYSTOLIC BLOOD PRESS GE 130-139MM HG: ICD-10-PCS | Mod: CPTII,S$GLB,, | Performed by: FAMILY MEDICINE

## 2019-08-12 PROCEDURE — 99999 PR PBB SHADOW E&M-EST. PATIENT-LVL III: CPT | Mod: PBBFAC,,, | Performed by: FAMILY MEDICINE

## 2019-08-12 PROCEDURE — 3075F SYST BP GE 130 - 139MM HG: CPT | Mod: CPTII,S$GLB,, | Performed by: FAMILY MEDICINE

## 2019-08-12 RX ORDER — TRAMADOL HYDROCHLORIDE 50 MG/1
TABLET ORAL
COMMUNITY
Start: 2019-07-02 | End: 2019-09-19

## 2019-08-12 RX ORDER — ZINC GLUCONATE 50 MG
50 TABLET ORAL
COMMUNITY

## 2019-08-12 RX ORDER — ALPRAZOLAM 1 MG/1
1 TABLET ORAL 3 TIMES DAILY
Qty: 90 TABLET | Refills: 5 | Status: SHIPPED | OUTPATIENT
Start: 2019-08-12 | End: 2020-01-23 | Stop reason: SDUPTHER

## 2019-08-12 RX ORDER — LANOLIN ALCOHOL/MO/W.PET/CERES
400 CREAM (GRAM) TOPICAL DAILY
COMMUNITY

## 2019-08-12 RX ORDER — UBIDECARENONE 30 MG
30 CAPSULE ORAL DAILY
COMMUNITY

## 2019-08-12 RX ORDER — AMOXICILLIN 500 MG
1 CAPSULE ORAL DAILY
COMMUNITY

## 2019-08-12 RX ORDER — TESTOSTERONE CYPIONATE 1000 MG/10ML
INJECTION, SOLUTION INTRAMUSCULAR
COMMUNITY

## 2019-08-12 NOTE — PROGRESS NOTES
The patient presents today to fu DJD w recent right TKR with a long history of DJD.  Also fu anxiety  Past Medical History:  Past Medical History:   Diagnosis Date    Anxiety     BPH (benign prostatic hypertrophy)     DDD (degenerative disc disease), lumbar     Hypertension     GAURANG (obstructive sleep apnea)      Past Surgical History:   Procedure Laterality Date    JOINT REPLACEMENT       Review of patient's allergies indicates:   Allergen Reactions    No known drug allergies      Current Outpatient Medications on File Prior to Visit   Medication Sig Dispense Refill    ALPRAZolam (XANAX) 1 MG tablet Take 1 tablet (1 mg total) by mouth 3 (three) times daily. 90 tablet 5    celecoxib (CELEBREX) 200 MG capsule Take 200 mg by mouth 2 (two) times daily.  2    furosemide (LASIX) 40 MG tablet 40 mg daily as needed.   3    tamsulosin (FLOMAX) 0.4 mg Cp24       VENTOLIN HFA 90 mcg/actuation inhaler INHALE 2 PUFFS BY MOUTH EVERY 6 HOURS AS NEEDED FOR WHEEZE. RESCUE 18 Inhaler 2     No current facility-administered medications on file prior to visit.      Social History     Socioeconomic History    Marital status: Single     Spouse name: Not on file    Number of children: Not on file    Years of education: Not on file    Highest education level: Not on file   Occupational History     Employer: [employer]   Social Needs    Financial resource strain: Not on file    Food insecurity:     Worry: Not on file     Inability: Not on file    Transportation needs:     Medical: Not on file     Non-medical: Not on file   Tobacco Use    Smoking status: Never Smoker    Smokeless tobacco: Never Used   Substance and Sexual Activity    Alcohol use: Yes     Alcohol/week: 1.0 oz     Types: 2 Standard drinks or equivalent per week     Comment: socially    Drug use: No    Sexual activity: Yes     Partners: Female   Lifestyle    Physical activity:     Days per week: Not on file     Minutes per session: Not on file     Stress: Not on file   Relationships    Social connections:     Talks on phone: Not on file     Gets together: Not on file     Attends Cheondoism service: Not on file     Active member of club or organization: Not on file     Attends meetings of clubs or organizations: Not on file     Relationship status: Not on file   Other Topics Concern    Not on file   Social History Narrative    ** Merged History Encounter **          Family History   Problem Relation Age of Onset    Heart disease Mother     Cancer Mother     Heart disease Father     Prostate cancer Brother          ROS:GENERAL: No fever, chills, fatigability or weight loss.  SKIN: No rashes, itching or changes in color or texture of skin.  HEAD: No headaches or recent head trauma.EYES: Visual acuity fine. No photophobia, ocular pain or diplopia.EARS: Denies ear pain, discharge or vertigo.NOSE: No loss of smell, no epistaxis or postnasal drip.MOUTH & THROAT: No hoarseness or change in voice. No excessive gum bleeding.NODES: Denies swollen glands.  CHEST: Denies RIVAS, cyanosis, wheezing, cough and sputum production.  CARDIOVASCULAR: Denies chest pain, PND, orthopnea or reduced exercise tolerance.  ABDOMEN: Appetite fine. No weight loss. Denies diarrhea, abdominal pain, hematemesis or blood in stool.  URINARY: No flank pain, dysuria or hematuria.  PERIPHERAL VASCULAR: No claudication or cyanosis.  MUSCULOSKELETAL: See above.  NEUROLOGIC: No history of seizures, paralysis, alteration of gait or coordination.  PE:    HEAD: Normocephalic, atraumatic.EYES: PERRL. EOMI.   EARS: TM's intact. Light reflex normal. No retraction or perforation.   NOSE: Mucosa pink. Airway clear.MOUTH & THROAT: No tonsillar enlargement. No pharyngeal erythema or exudate. No stridor.  NODES: No cervical, axillary or inguinal lymph node enlargement.  CHEST: Lungs clear to auscultation.  CARDIOVASCULAR: Normal S1, S2. No rubs, murmurs or gallops.  ABDOMEN: Bowel sounds normal. Not  distended. Soft. No tenderness or masses.  MUSCULOSKELETAL: Gen degenerative changes and tenderness medial/lateral knee.   NEUROLOGIC: Cranial Nerves: II-XII grossly intact.  Motor: 5/5 strength major flexors/extensors.  DTR's: Knees, Ankles 2+ and equal bilaterally; downgoing toes.  Sensory: Intact to light touch distally.  Gait & Posture: Normal gait and fine motion. No cerebellar signs.     Impression:DJD  Anxiety  Plan: Rev and cont meds

## 2019-08-28 ENCOUNTER — TELEPHONE (OUTPATIENT)
Dept: FAMILY MEDICINE | Facility: CLINIC | Age: 69
End: 2019-08-28

## 2019-08-28 RX ORDER — DOXYCYCLINE 100 MG/1
100 CAPSULE ORAL EVERY 12 HOURS
Qty: 14 CAPSULE | Refills: 0 | Status: SHIPPED | OUTPATIENT
Start: 2019-08-28 | End: 2019-09-19

## 2019-08-28 NOTE — TELEPHONE ENCOUNTER
----- Message from Yanet Pepper sent at 8/28/2019 11:34 AM CDT -----  Contact: pt   Type:  Sooner Apoointment Request    Caller is requesting a sooner appointment.  Caller declined first available appointment listed below.  Caller will not accept being placed on the waitlist and is requesting a message be sent to doctor.  Name of Caller:NACHO PATRICK   When is the first available appointment? 10/16/2019  Symptoms:bladder discomfort and frequent urination   Would the patient rather a call back or a response via [facility]? Call   Best Call Back Number: [phone]    Additional Information:

## 2019-09-09 ENCOUNTER — OFFICE VISIT (OUTPATIENT)
Dept: UROLOGY | Facility: CLINIC | Age: 69
End: 2019-09-09
Payer: MEDICARE

## 2019-09-09 ENCOUNTER — TELEPHONE (OUTPATIENT)
Dept: FAMILY MEDICINE | Facility: CLINIC | Age: 69
End: 2019-09-09

## 2019-09-09 VITALS — WEIGHT: 217.63 LBS | HEIGHT: 71 IN | BODY MASS INDEX: 30.47 KG/M2

## 2019-09-09 DIAGNOSIS — R35.0 URINARY FREQUENCY: Primary | ICD-10-CM

## 2019-09-09 DIAGNOSIS — R39.15 URINARY URGENCY: ICD-10-CM

## 2019-09-09 DIAGNOSIS — N13.8 ENLARGED PROSTATE WITH URINARY OBSTRUCTION: ICD-10-CM

## 2019-09-09 DIAGNOSIS — N40.1 ENLARGED PROSTATE WITH URINARY OBSTRUCTION: ICD-10-CM

## 2019-09-09 LAB
BILIRUB SERPL-MCNC: ABNORMAL MG/DL
BLOOD URINE, POC: ABNORMAL
COLOR, POC UA: YELLOW
GLUCOSE UR QL STRIP: ABNORMAL
KETONES UR QL STRIP: ABNORMAL
LEUKOCYTE ESTERASE URINE, POC: ABNORMAL
NITRITE, POC UA: ABNORMAL
PH, POC UA: 7.5
PROTEIN, POC: ABNORMAL
SPECIFIC GRAVITY, POC UA: 1.01
UROBILINOGEN, POC UA: ABNORMAL

## 2019-09-09 PROCEDURE — 99213 PR OFFICE/OUTPT VISIT, EST, LEVL III, 20-29 MIN: ICD-10-PCS | Mod: 25,S$GLB,, | Performed by: UROLOGY

## 2019-09-09 PROCEDURE — 81002 POCT URINE DIPSTICK WITHOUT MICROSCOPE: ICD-10-PCS | Mod: S$GLB,,, | Performed by: UROLOGY

## 2019-09-09 PROCEDURE — 99213 OFFICE O/P EST LOW 20 MIN: CPT | Mod: 25,S$GLB,, | Performed by: UROLOGY

## 2019-09-09 PROCEDURE — 81002 URINALYSIS NONAUTO W/O SCOPE: CPT | Mod: S$GLB,,, | Performed by: UROLOGY

## 2019-09-09 PROCEDURE — 1101F PR PT FALLS ASSESS DOC 0-1 FALLS W/OUT INJ PAST YR: ICD-10-PCS | Mod: CPTII,S$GLB,, | Performed by: UROLOGY

## 2019-09-09 PROCEDURE — 99999 PR PBB SHADOW E&M-EST. PATIENT-LVL II: CPT | Mod: PBBFAC,,, | Performed by: UROLOGY

## 2019-09-09 PROCEDURE — 1101F PT FALLS ASSESS-DOCD LE1/YR: CPT | Mod: CPTII,S$GLB,, | Performed by: UROLOGY

## 2019-09-09 PROCEDURE — 99999 PR PBB SHADOW E&M-EST. PATIENT-LVL II: ICD-10-PCS | Mod: PBBFAC,,, | Performed by: UROLOGY

## 2019-09-09 NOTE — TELEPHONE ENCOUNTER
----- Message from Joi Tellez sent at 9/9/2019 10:02 AM CDT -----  Contact: [phone]  .Type:  Needs Medical Advice    Who Called: ,name  Symptoms (please be specific): Sharp pain in back and shoulder    How long has patient had these symptoms:  [phone] (home)   Pharmacy name and phone #: .  [id number] [place] [facility] [address] [address]  Phone: [phone] Fax: [phone]  Would the patient rather a call back or a response via [facility]? Call back   Best Call Back Number: .[phone](home)     Additional Information: Pt would like to be seen today also.

## 2019-09-09 NOTE — PROGRESS NOTES
Subjective:       Patient ID: Andrés Casillas is a 69 y.o. male.    Chief Complaint: Urinary Frequency    HPI     69-year-old with BPH she has been taking Flomax consistently for the last 6 months.  He was doing well but beginning several weeks ago he felt like he had worsening urinary urgency.  He denies dysuria.  He was given a course of doxycycline 1 which he is nearly finished.  He feels that there has been some improvement but he still has bothersome urinary urgency.  He denies gross hematuria and dysuria.  His urinalysis is clear today.  IPSS is 23  Urine dipstick shows negative for all components except trace blood.  PVR 95 ml.    Review of Systems   Constitutional: Negative for fever.   Genitourinary: Negative for dysuria and hematuria.       Objective:      Physical Exam   Constitutional: He is oriented to person, place, and time. He appears well-developed and well-nourished.   Pulmonary/Chest: Effort normal.   Abdominal: Soft.   Musculoskeletal: He exhibits no edema.   Neurological: He is alert and oriented to person, place, and time.   Skin: No rash noted.   Psychiatric: He has a normal mood and affect.   Vitals reviewed.      Assessment:       1. Urinary frequency    2. Enlarged prostate with urinary obstruction    3. Urinary urgency        Plan:       Urinary frequency  -     POCT urine dipstick without microscope  -     POCT Bladder Scan  -     Cystoscopy; Future    Enlarged prostate with urinary obstruction    Urinary urgency      I suspect his symptoms are primarily due to BPH.  We discussed surgical treatment options.  I recommend baseline cystoscopy.  For now complete doxycycline and continue Flomax.

## 2019-09-19 ENCOUNTER — PROCEDURE VISIT (OUTPATIENT)
Dept: UROLOGY | Facility: CLINIC | Age: 69
End: 2019-09-19
Payer: MEDICARE

## 2019-09-19 VITALS
DIASTOLIC BLOOD PRESSURE: 77 MMHG | SYSTOLIC BLOOD PRESSURE: 132 MMHG | WEIGHT: 218.25 LBS | HEIGHT: 71 IN | BODY MASS INDEX: 30.56 KG/M2 | HEART RATE: 74 BPM

## 2019-09-19 DIAGNOSIS — R35.0 URINARY FREQUENCY: ICD-10-CM

## 2019-09-19 DIAGNOSIS — N13.8 ENLARGED PROSTATE WITH URINARY OBSTRUCTION: Primary | ICD-10-CM

## 2019-09-19 DIAGNOSIS — N40.1 ENLARGED PROSTATE WITH URINARY OBSTRUCTION: Primary | ICD-10-CM

## 2019-09-19 LAB
BILIRUB SERPL-MCNC: NORMAL MG/DL
BLOOD URINE, POC: NORMAL
COLOR, POC UA: NORMAL
GLUCOSE UR QL STRIP: NORMAL
KETONES UR QL STRIP: NORMAL
LEUKOCYTE ESTERASE URINE, POC: NORMAL
NITRITE, POC UA: NORMAL
PH, POC UA: 7
PROTEIN, POC: NORMAL
SPECIFIC GRAVITY, POC UA: 1.01
UROBILINOGEN, POC UA: NORMAL

## 2019-09-19 PROCEDURE — 52000 CYSTOSCOPY: ICD-10-PCS | Mod: S$GLB,,, | Performed by: UROLOGY

## 2019-09-19 PROCEDURE — 52000 CYSTOURETHROSCOPY: CPT | Mod: S$GLB,,, | Performed by: UROLOGY

## 2019-09-19 PROCEDURE — 81002 URINALYSIS NONAUTO W/O SCOPE: CPT | Mod: S$GLB,,, | Performed by: UROLOGY

## 2019-09-19 PROCEDURE — 81002 POCT URINE DIPSTICK WITHOUT MICROSCOPE: ICD-10-PCS | Mod: S$GLB,,, | Performed by: UROLOGY

## 2019-09-19 NOTE — PROCEDURES
"Cystoscopy  Date/Time: 9/19/2019 2:06 PM  Performed by: JOVANA Soto MD  Authorized by: JOVANA Soto MD     Consent Done?:  Yes (Written)  Time out: Immediately prior to procedure a "time out" was called to verify the correct patient, procedure, equipment, support staff and site/side marked as required.    Indications: BPH    Position:  Supine  Anesthesia:  Lidocaine jelly  Patient sedated?: No    Preparation: Patient was prepped and draped in usual sterile fashion      Scope type:  Flexible cystoscope    Patient tolerance:  Patient tolerated the procedure well with no immediate complications    Blood Loss:  None    The patients clinic history and physical were reviewed and there are no changes.  He has BPH with an IPSS of 23.  He has failed medical management.    The flexible cystoscope was placed into the urethra and carefully advanced into the bladder.  A careful cystoscopic exam was then performed.  The entire bladder mucosa was systematically visualized.  Findings include mild bladder wall trabeculation.  There were no lesions, masses foreign bodies or stones.   Each ureteral orifices were visualized and both had clear efflux of urine.  On retroflexion there was only a small intravesical gland.  The cystoscope was then removed and I examined the entire length of the urethra.  There was moderate trilobar enlargement of the prostate otherwise the urethra appeared normal.  He tolerated the procedure well.  There were no complications    Impression:  BPH, otherwise normal cystoscopy.    Plan:  We discussed surgical treatment options specifically Urolift.  He wants to consider and will call back to schedule if he elects.    "

## 2019-10-22 ENCOUNTER — TELEPHONE (OUTPATIENT)
Dept: FAMILY MEDICINE | Facility: CLINIC | Age: 69
End: 2019-10-22

## 2019-10-22 NOTE — TELEPHONE ENCOUNTER
----- Message from Nellie Kay sent at 10/22/2019 10:24 AM CDT -----  Patient needs call back rg referral to physical therapy for back..[phone]

## 2019-12-08 RX ORDER — ALBUTEROL SULFATE 90 UG/1
AEROSOL, METERED RESPIRATORY (INHALATION)
Qty: 18 INHALER | Refills: 12 | Status: SHIPPED | OUTPATIENT
Start: 2019-12-08 | End: 2020-12-10

## 2019-12-23 RX ORDER — FUROSEMIDE 40 MG/1
TABLET ORAL
Qty: 90 TABLET | Refills: 4 | Status: SHIPPED | OUTPATIENT
Start: 2019-12-23 | End: 2024-02-14

## 2020-01-09 ENCOUNTER — TELEPHONE (OUTPATIENT)
Dept: FAMILY MEDICINE | Facility: CLINIC | Age: 70
End: 2020-01-09

## 2020-01-09 NOTE — TELEPHONE ENCOUNTER
----- Message from Nellie Kay sent at 1/9/2020  2:36 PM CST -----  ..Type:  Sooner Apoointment Request    Caller is requesting a sooner appointment.  Caller declined first available appointment listed below.  Caller will not accept being placed on the waitlist and is requesting a message be sent to doctor.  Name of Caller:patient  When is the first available appointment?01/23  Symptoms:b/p check  Would the patient rather a call back or a response via [facility]?   Best Call Back Number:..[phone] (home)     Additional Information:

## 2020-01-10 ENCOUNTER — TELEPHONE (OUTPATIENT)
Dept: FAMILY MEDICINE | Facility: CLINIC | Age: 70
End: 2020-01-10

## 2020-01-10 NOTE — TELEPHONE ENCOUNTER
----- Message from Coco Fitzgerald sent at 1/10/2020  2:40 PM CST -----  Contact: self  Type:  Patient Returning Call    Who Called:pt  Who Left Message for Patient:n/a  Does the patient know what this is regarding?:no  Would the patient rather a call back or a response via [facility]? Call back  Best Call Back Number:[phone]  Additional Information: pt states nurse keeps calling land line and ask to call number listed.

## 2020-01-13 NOTE — TELEPHONE ENCOUNTER
Pt declined appt at this time, was put on meds at a different office. Will continue to monitor and keep his appt on 1/23/20

## 2020-01-23 ENCOUNTER — OFFICE VISIT (OUTPATIENT)
Dept: FAMILY MEDICINE | Facility: CLINIC | Age: 70
End: 2020-01-23
Payer: MEDICARE

## 2020-01-23 VITALS
TEMPERATURE: 98 F | BODY MASS INDEX: 30.94 KG/M2 | HEART RATE: 97 BPM | DIASTOLIC BLOOD PRESSURE: 75 MMHG | WEIGHT: 221 LBS | SYSTOLIC BLOOD PRESSURE: 122 MMHG | HEIGHT: 71 IN

## 2020-01-23 DIAGNOSIS — N40.0 BENIGN PROSTATIC HYPERPLASIA, UNSPECIFIED WHETHER LOWER URINARY TRACT SYMPTOMS PRESENT: ICD-10-CM

## 2020-01-23 DIAGNOSIS — I10 ESSENTIAL HYPERTENSION: Primary | ICD-10-CM

## 2020-01-23 PROCEDURE — 1101F PT FALLS ASSESS-DOCD LE1/YR: CPT | Mod: HCNC,CPTII,S$GLB, | Performed by: FAMILY MEDICINE

## 2020-01-23 PROCEDURE — 99214 OFFICE O/P EST MOD 30 MIN: CPT | Mod: HCNC,S$GLB,, | Performed by: FAMILY MEDICINE

## 2020-01-23 PROCEDURE — 1159F MED LIST DOCD IN RCRD: CPT | Mod: HCNC,S$GLB,, | Performed by: FAMILY MEDICINE

## 2020-01-23 PROCEDURE — 3078F DIAST BP <80 MM HG: CPT | Mod: HCNC,CPTII,S$GLB, | Performed by: FAMILY MEDICINE

## 2020-01-23 PROCEDURE — 3078F PR MOST RECENT DIASTOLIC BLOOD PRESSURE < 80 MM HG: ICD-10-PCS | Mod: HCNC,CPTII,S$GLB, | Performed by: FAMILY MEDICINE

## 2020-01-23 PROCEDURE — 1159F PR MEDICATION LIST DOCUMENTED IN MEDICAL RECORD: ICD-10-PCS | Mod: HCNC,S$GLB,, | Performed by: FAMILY MEDICINE

## 2020-01-23 PROCEDURE — 3074F PR MOST RECENT SYSTOLIC BLOOD PRESSURE < 130 MM HG: ICD-10-PCS | Mod: HCNC,CPTII,S$GLB, | Performed by: FAMILY MEDICINE

## 2020-01-23 PROCEDURE — 99999 PR PBB SHADOW E&M-EST. PATIENT-LVL III: ICD-10-PCS | Mod: PBBFAC,HCNC,, | Performed by: FAMILY MEDICINE

## 2020-01-23 PROCEDURE — 99999 PR PBB SHADOW E&M-EST. PATIENT-LVL III: CPT | Mod: PBBFAC,HCNC,, | Performed by: FAMILY MEDICINE

## 2020-01-23 PROCEDURE — 1126F AMNT PAIN NOTED NONE PRSNT: CPT | Mod: HCNC,S$GLB,, | Performed by: FAMILY MEDICINE

## 2020-01-23 PROCEDURE — 99499 UNLISTED E&M SERVICE: CPT | Mod: HCNC,S$GLB,, | Performed by: FAMILY MEDICINE

## 2020-01-23 PROCEDURE — 1101F PR PT FALLS ASSESS DOC 0-1 FALLS W/OUT INJ PAST YR: ICD-10-PCS | Mod: HCNC,CPTII,S$GLB, | Performed by: FAMILY MEDICINE

## 2020-01-23 PROCEDURE — 1126F PR PAIN SEVERITY QUANTIFIED, NO PAIN PRESENT: ICD-10-PCS | Mod: HCNC,S$GLB,, | Performed by: FAMILY MEDICINE

## 2020-01-23 PROCEDURE — 99499 RISK ADDL DX/OHS AUDIT: ICD-10-PCS | Mod: HCNC,S$GLB,, | Performed by: FAMILY MEDICINE

## 2020-01-23 PROCEDURE — 3074F SYST BP LT 130 MM HG: CPT | Mod: HCNC,CPTII,S$GLB, | Performed by: FAMILY MEDICINE

## 2020-01-23 PROCEDURE — 99214 PR OFFICE/OUTPT VISIT, EST, LEVL IV, 30-39 MIN: ICD-10-PCS | Mod: HCNC,S$GLB,, | Performed by: FAMILY MEDICINE

## 2020-01-23 RX ORDER — MONTELUKAST SODIUM 10 MG/1
10 TABLET ORAL NIGHTLY
Qty: 30 TABLET | Refills: 11 | Status: SHIPPED | OUTPATIENT
Start: 2020-01-23 | End: 2020-02-22

## 2020-01-23 RX ORDER — ALPRAZOLAM 1 MG/1
1 TABLET ORAL 3 TIMES DAILY
Qty: 90 TABLET | Refills: 5 | Status: SHIPPED | OUTPATIENT
Start: 2020-01-23 | End: 2020-07-13 | Stop reason: SDUPTHER

## 2020-01-23 RX ORDER — LISINOPRIL 20 MG/1
TABLET ORAL
COMMUNITY
Start: 2020-01-10 | End: 2020-01-23 | Stop reason: SDUPTHER

## 2020-01-23 RX ORDER — LISINOPRIL 20 MG/1
20 TABLET ORAL DAILY
Qty: 30 TABLET | Refills: 11 | Status: SHIPPED | OUTPATIENT
Start: 2020-01-23 | End: 2020-12-11

## 2020-01-23 NOTE — PROGRESS NOTES
The patient presents today to fu el BP better now on lisinopril 20.  Also fu anxiety asthma and recent CXR cw asbestosis which he was exposed to.   Past Medical History:  Past Medical History:   Diagnosis Date    Anxiety     BPH (benign prostatic hypertrophy)     DDD (degenerative disc disease), lumbar     Hypertension     GAURANG (obstructive sleep apnea)      Past Surgical History:   Procedure Laterality Date    JOINT REPLACEMENT       Review of patient's allergies indicates:   Allergen Reactions    No known drug allergies      Current Outpatient Medications on File Prior to Visit   Medication Sig Dispense Refill    ALPRAZolam (XANAX) 1 MG tablet Take 1 tablet (1 mg total) by mouth 3 (three) times daily. (Patient taking differently: Take 1 mg by mouth 3 (three) times daily as needed. ) 90 tablet 5    co-enzyme Q-10 30 mg capsule Take 30 mg by mouth once daily.       fish oil-omega-3 fatty acids 300-1,000 mg capsule Take 1 capsule by mouth once daily.       furosemide (LASIX) 40 MG tablet TAKE 1 TABLET BY MOUTH DAILY AS NEEDED FOR FLUID RETENSION 90 tablet 4    lisinopril (PRINIVIL,ZESTRIL) 20 MG tablet       magnesium oxide (MAG-OX) 400 mg (241.3 mg magnesium) tablet Take 400 mg by mouth once daily.       tamsulosin (FLOMAX) 0.4 mg Cp24 0.4 mg.       testosterone cypionate (DEPOTESTOTERONE CYPIONATE) 100 mg/mL injection Inject into the muscle.      VENTOLIN HFA 90 mcg/actuation inhaler INHALE 2 PUFFS BY MOUTH EVERY 6 HOURS AS NEEDED FOR WHEEZE. RESCUE 18 Inhaler 12    zinc gluconate 50 mg tablet Take 50 mg by mouth.       No current facility-administered medications on file prior to visit.      Social History     Socioeconomic History    Marital status: Single     Spouse name: Not on file    Number of children: Not on file    Years of education: Not on file    Highest education level: Not on file   Occupational History     Employer: [employer]   Social Needs    Financial resource strain: Not on  file    Food insecurity:     Worry: Not on file     Inability: Not on file    Transportation needs:     Medical: Not on file     Non-medical: Not on file   Tobacco Use    Smoking status: Never Smoker    Smokeless tobacco: Never Used   Substance and Sexual Activity    Alcohol use: Yes     Alcohol/week: 1.7 standard drinks     Types: 2 Standard drinks or equivalent per week     Comment: socially    Drug use: No    Sexual activity: Yes     Partners: Female   Lifestyle    Physical activity:     Days per week: Not on file     Minutes per session: Not on file    Stress: Not on file   Relationships    Social connections:     Talks on phone: Not on file     Gets together: Not on file     Attends Mandaen service: Not on file     Active member of club or organization: Not on file     Attends meetings of clubs or organizations: Not on file     Relationship status: Not on file   Other Topics Concern    Not on file   Social History Narrative    ** Merged History Encounter **          Family History   Problem Relation Age of Onset    Heart disease Mother     Cancer Mother     Heart disease Father     Prostate cancer Brother          ROS:GENERAL: No fever, chills, fatigability or weight loss.  SKIN: No rashes, itching or changes in color or texture of skin.  HEAD: No headaches or recent head trauma.EYES: Visual acuity fine. No photophobia, ocular pain or diplopia.EARS: Denies ear pain, discharge or vertigo.NOSE: No loss of smell, no epistaxis or postnasal drip.MOUTH & THROAT: No hoarseness or change in voice. No excessive gum bleeding.NODES: Denies swollen glands.  CHEST: Denies RIVAS, cyanosis, wheezing, cough and sputum production.  CARDIOVASCULAR: Denies chest pain, PND, orthopnea or reduced exercise tolerance.  ABDOMEN: Appetite fine. No weight loss. Denies diarrhea, abdominal pain, hematemesis or blood in stool.  URINARY: No flank pain, dysuria or hematuria.  PERIPHERAL VASCULAR: No claudication or  cyanosis.  MUSCULOSKELETAL: See above.  NEUROLOGIC: No history of seizures, paralysis, alteration of gait or coordination.  PE:    HEAD: Normocephalic, atraumatic.EYES: PERRL. EOMI.   EARS: TM's intact. Light reflex normal. No retraction or perforation.   NOSE: Mucosa pink. Airway clear.MOUTH & THROAT: No tonsillar enlargement. No pharyngeal erythema or exudate. No stridor.  NODES: No cervical, axillary or inguinal lymph node enlargement.  CHEST: Lungs clear to auscultation.  CARDIOVASCULAR: Normal S1, S2. No rubs, murmurs or gallops.  ABDOMEN: Bowel sounds normal. Not distended. Soft. No tenderness or masses.  MUSCULOSKELETAL: Gen degenerative changes and tenderness medial/lateral knee.   NEUROLOGIC: Cranial Nerves: II-XII grossly intact.  Motor: 5/5 strength major flexors/extensors.  DTR's: Knees, Ankles 2+ and equal bilaterally; downgoing toes.  Sensory: Intact to light touch distally.  Gait & Posture: Normal gait and fine motion. No cerebellar signs.     Impression:HBP  Asthma  Anxiety  Plan: Rev and cont meds    Trial montelukast

## 2020-01-31 ENCOUNTER — TELEPHONE (OUTPATIENT)
Dept: FAMILY MEDICINE | Facility: CLINIC | Age: 70
End: 2020-01-31

## 2020-01-31 NOTE — TELEPHONE ENCOUNTER
**FYI**    Pt calling to let you know due to side effects (very bizarre dreams) he stopped taking the singulair. He read that it was a poss side effect

## 2020-01-31 NOTE — TELEPHONE ENCOUNTER
----- Message from [place] sent at 1/31/2020  2:33 PM CST -----  Contact: Pt  Please give pt a call at [phone] .) regarding some concerns about his medication and the side effects. Pt is also having some back pain when he coughs

## 2020-05-11 ENCOUNTER — TELEPHONE (OUTPATIENT)
Dept: FAMILY MEDICINE | Facility: CLINIC | Age: 70
End: 2020-05-11

## 2020-05-11 DIAGNOSIS — Z11.59 ENCOUNTER FOR SCREENING FOR OTHER VIRAL DISEASES: Primary | ICD-10-CM

## 2020-05-11 NOTE — TELEPHONE ENCOUNTER
Patient wanting to have COVID-19 antibody test done, patient is going to contact his insurance to see if they will cover the cost of the test.

## 2020-05-11 NOTE — TELEPHONE ENCOUNTER
----- Message from Chely Moran sent at 5/11/2020  3:17 PM CDT -----  Contact: pt  Would like to consult with nurse regarding questions about Covid-19 testing, and how to go about getting it schedule. Please give a call back at [phone] if no answer call [phone].              Thanks,  Chely CASAS

## 2020-05-12 ENCOUNTER — LAB VISIT (OUTPATIENT)
Dept: LAB | Facility: HOSPITAL | Age: 70
End: 2020-05-12
Attending: FAMILY MEDICINE
Payer: MEDICARE

## 2020-05-12 DIAGNOSIS — Z11.59 ENCOUNTER FOR SCREENING FOR OTHER VIRAL DISEASES: ICD-10-CM

## 2020-05-12 LAB — SARS-COV-2 IGG SERPLBLD QL IA.RAPID: NEGATIVE

## 2020-05-12 PROCEDURE — 36415 COLL VENOUS BLD VENIPUNCTURE: CPT | Mod: HCNC,PO

## 2020-05-12 PROCEDURE — 86769 SARS-COV-2 COVID-19 ANTIBODY: CPT | Mod: HCNC

## 2020-05-13 NOTE — PROGRESS NOTES
Please CALL patient with results and Document verification.   [phone]  COVID antibody testing is negative

## 2020-05-14 ENCOUNTER — TELEPHONE (OUTPATIENT)
Dept: FAMILY MEDICINE | Facility: CLINIC | Age: 70
End: 2020-05-14

## 2020-05-14 DIAGNOSIS — G47.33 OSA (OBSTRUCTIVE SLEEP APNEA): Primary | ICD-10-CM

## 2020-05-14 NOTE — TELEPHONE ENCOUNTER
----- Message from Divya Marte sent at 5/14/2020 10:54 AM CDT -----  Contact: pt  Pt calling to renew his cpap equipment [facility] [phone]  Pt call pt at [facility] [phone]

## 2020-05-19 NOTE — TELEPHONE ENCOUNTER
Patient has an appointment with you on 2020.  I spoke with Zunilda @ [facility] and she states that he just needs an updated order because his last order has .  Ms. Zunilda says the orders are good for 1 year.  See pended order and please advise.

## 2020-06-29 ENCOUNTER — PATIENT OUTREACH (OUTPATIENT)
Dept: ADMINISTRATIVE | Facility: HOSPITAL | Age: 70
End: 2020-06-29

## 2020-07-13 ENCOUNTER — OFFICE VISIT (OUTPATIENT)
Dept: FAMILY MEDICINE | Facility: CLINIC | Age: 70
End: 2020-07-13
Payer: MEDICARE

## 2020-07-13 VITALS
TEMPERATURE: 98 F | SYSTOLIC BLOOD PRESSURE: 138 MMHG | BODY MASS INDEX: 30.61 KG/M2 | HEART RATE: 78 BPM | HEIGHT: 71 IN | WEIGHT: 218.63 LBS | DIASTOLIC BLOOD PRESSURE: 88 MMHG

## 2020-07-13 DIAGNOSIS — I10 ESSENTIAL HYPERTENSION: Primary | Chronic | ICD-10-CM

## 2020-07-13 DIAGNOSIS — Z12.5 ENCOUNTER FOR PROSTATE CANCER SCREENING: ICD-10-CM

## 2020-07-13 DIAGNOSIS — E34.9 HYPOTESTOSTERONEMIA: ICD-10-CM

## 2020-07-13 DIAGNOSIS — F13.20 BENZODIAZEPINE DEPENDENCE, CONTINUOUS: ICD-10-CM

## 2020-07-13 DIAGNOSIS — Z00.01 ENCOUNTER FOR GENERAL ADULT MEDICAL EXAMINATION WITH ABNORMAL FINDINGS: ICD-10-CM

## 2020-07-13 DIAGNOSIS — F41.9 ANXIETY: ICD-10-CM

## 2020-07-13 DIAGNOSIS — Z13.6 ENCOUNTER FOR LIPID SCREENING FOR CARDIOVASCULAR DISEASE: ICD-10-CM

## 2020-07-13 DIAGNOSIS — Z13.220 ENCOUNTER FOR LIPID SCREENING FOR CARDIOVASCULAR DISEASE: ICD-10-CM

## 2020-07-13 DIAGNOSIS — Z79.899 ENCOUNTER FOR LONG-TERM (CURRENT) USE OF MEDICATIONS: ICD-10-CM

## 2020-07-13 PROBLEM — M17.11 OSTEOARTHRITIS OF RIGHT KNEE: Status: ACTIVE | Noted: 2019-06-19

## 2020-07-13 PROCEDURE — 1126F PR PAIN SEVERITY QUANTIFIED, NO PAIN PRESENT: ICD-10-PCS | Mod: HCNC,S$GLB,, | Performed by: FAMILY MEDICINE

## 2020-07-13 PROCEDURE — 3008F PR BODY MASS INDEX (BMI) DOCUMENTED: ICD-10-PCS | Mod: HCNC,CPTII,S$GLB, | Performed by: FAMILY MEDICINE

## 2020-07-13 PROCEDURE — 99214 OFFICE O/P EST MOD 30 MIN: CPT | Mod: HCNC,S$GLB,, | Performed by: FAMILY MEDICINE

## 2020-07-13 PROCEDURE — 3008F BODY MASS INDEX DOCD: CPT | Mod: HCNC,CPTII,S$GLB, | Performed by: FAMILY MEDICINE

## 2020-07-13 PROCEDURE — 99499 UNLISTED E&M SERVICE: CPT | Mod: S$GLB,,, | Performed by: FAMILY MEDICINE

## 2020-07-13 PROCEDURE — 3079F PR MOST RECENT DIASTOLIC BLOOD PRESSURE 80-89 MM HG: ICD-10-PCS | Mod: HCNC,CPTII,S$GLB, | Performed by: FAMILY MEDICINE

## 2020-07-13 PROCEDURE — 99499 RISK ADDL DX/OHS AUDIT: ICD-10-PCS | Mod: S$GLB,,, | Performed by: FAMILY MEDICINE

## 2020-07-13 PROCEDURE — 1159F MED LIST DOCD IN RCRD: CPT | Mod: HCNC,S$GLB,, | Performed by: FAMILY MEDICINE

## 2020-07-13 PROCEDURE — 1101F PT FALLS ASSESS-DOCD LE1/YR: CPT | Mod: HCNC,CPTII,S$GLB, | Performed by: FAMILY MEDICINE

## 2020-07-13 PROCEDURE — 1159F PR MEDICATION LIST DOCUMENTED IN MEDICAL RECORD: ICD-10-PCS | Mod: HCNC,S$GLB,, | Performed by: FAMILY MEDICINE

## 2020-07-13 PROCEDURE — 3075F SYST BP GE 130 - 139MM HG: CPT | Mod: HCNC,CPTII,S$GLB, | Performed by: FAMILY MEDICINE

## 2020-07-13 PROCEDURE — 3075F PR MOST RECENT SYSTOLIC BLOOD PRESS GE 130-139MM HG: ICD-10-PCS | Mod: HCNC,CPTII,S$GLB, | Performed by: FAMILY MEDICINE

## 2020-07-13 PROCEDURE — 99214 PR OFFICE/OUTPT VISIT, EST, LEVL IV, 30-39 MIN: ICD-10-PCS | Mod: HCNC,S$GLB,, | Performed by: FAMILY MEDICINE

## 2020-07-13 PROCEDURE — 1126F AMNT PAIN NOTED NONE PRSNT: CPT | Mod: HCNC,S$GLB,, | Performed by: FAMILY MEDICINE

## 2020-07-13 PROCEDURE — 1101F PR PT FALLS ASSESS DOC 0-1 FALLS W/OUT INJ PAST YR: ICD-10-PCS | Mod: HCNC,CPTII,S$GLB, | Performed by: FAMILY MEDICINE

## 2020-07-13 PROCEDURE — 3079F DIAST BP 80-89 MM HG: CPT | Mod: HCNC,CPTII,S$GLB, | Performed by: FAMILY MEDICINE

## 2020-07-13 PROCEDURE — 99999 PR PBB SHADOW E&M-EST. PATIENT-LVL IV: CPT | Mod: PBBFAC,HCNC,, | Performed by: FAMILY MEDICINE

## 2020-07-13 PROCEDURE — 99999 PR PBB SHADOW E&M-EST. PATIENT-LVL IV: ICD-10-PCS | Mod: PBBFAC,HCNC,, | Performed by: FAMILY MEDICINE

## 2020-07-13 RX ORDER — ALPRAZOLAM 1 MG/1
1 TABLET ORAL 3 TIMES DAILY
Qty: 90 TABLET | Refills: 5 | Status: SHIPPED | OUTPATIENT
Start: 2020-07-13 | End: 2020-08-06 | Stop reason: SDUPTHER

## 2020-07-13 NOTE — PROGRESS NOTES
======================================================  GOAL of current visit: Est [facility]    Previous PCP: Dr. Cabrera Alan  Specialists: Urology: Dr. Omar Soto                        GI: Dr. Rich Richards                        Cardiology: Dr. Tyshawn Fang                        Sleep Medicine:  Dr. Santacruz                         ENT: Dr. Allen  Recent lab work: March 2019  Recent imaging:   Colonoscopy History:    Health Maintenance Due   Topic Date Due    TETANUS VACCINE  03/29/1970    Shingles Vaccine (1 of 2) 03/29/2002    Pneumococcal Vaccine (65+ Low/Medium Risk) (1 of 2 - PCV13) 03/29/2017     ======================================================  PLAN:      Problem List Items Addressed This Visit     Hypertension - Primary (Chronic)     Continue lisinopril.  Blood pressure is less than goal on recheck.Discussed hypertension disease course, DASH-diet and exercise.  Discussed medication regimen importance of treating high blood pressure.  Patient advised of risk of untreated blood pressure.    ER precautions were given for symptoms of hypertensive urgency and emergency.           Relevant Orders    TSH    Comprehensive metabolic panel    Anxiety     Refill Xanax for six months.  Follow-up in six months.  Discussed condition course and signs and symptoms to expect.  Patient advised of risk and benefits of medication use.  ER precautions.  Call MD or follow-up to clinic if not improving or worsening symptoms.           Relevant Medications    ALPRAZolam (XANAX) 1 MG tablet    Other Relevant Orders    TSH    Hypotestosteronemia     No records or levels available recently.  Will request records from his urologist.         Relevant Orders    CBC Without Differential    Encounter for long-term (current) use of medications     Baseline labs are stable.  Recheck in one year.Complete history and physical was completed today.  Complete and thorough medication reconciliation was performed.  Discussed risks  and benefits of medications.  Advised patient on orders and health maintenance.  We discussed old records and old labs if available.  Will request any records not available through epic.  Continue current medications listed on your summary sheet.           Relevant Medications    ALPRAZolam (XANAX) 1 MG tablet    Other Relevant Orders    CBC Without Differential    TSH    Comprehensive metabolic panel    Hemoglobin A1C    Lipid Panel    PSA, Screening (Completed)    Benzodiazepine dependence, continuous      Other Visit Diagnoses     Encounter for lipid screening for cardiovascular disease        Relevant Orders    Lipid Panel    Encounter for prostate cancer screening        Relevant Orders    PSA, Screening (Completed)    Encounter for general adult medical examination with abnormal findings        Relevant Orders    CBC Without Differential    TSH    Comprehensive metabolic panel    Hemoglobin A1C    Lipid Panel        Future Appointments     Date Provider Specialty Appt Notes    1/13/2021 Pedro Pablo Dc MD Family Medicine 6 mo FU           Medication List with Changes/Refills   Current Medications    ASPIRIN 325 MG TABLET    Take 325 mg by mouth.    CO-ENZYME Q-10 30 MG CAPSULE    Take 30 mg by mouth once daily.     FISH OIL-OMEGA-3 FATTY ACIDS 300-1,000 MG CAPSULE    Take 1 capsule by mouth once daily.     FUROSEMIDE (LASIX) 40 MG TABLET    TAKE 1 TABLET BY MOUTH DAILY AS NEEDED FOR FLUID RETENSION    GABAPENTIN (NEURONTIN) 300 MG CAPSULE    Take 300 mg by mouth.    LISINOPRIL (PRINIVIL,ZESTRIL) 20 MG TABLET    Take 1 tablet (20 mg total) by mouth once daily.    MAGNESIUM OXIDE (MAG-OX) 400 MG (241.3 MG MAGNESIUM) TABLET    Take 400 mg by mouth once daily.     TAMSULOSIN (FLOMAX) 0.4 MG CP24    0.4 mg.     TESTOSTERONE CYPIONATE (DEPOTESTOTERONE CYPIONATE) 100 MG/ML INJECTION    Inject into the muscle.    VENTOLIN HFA 90 MCG/ACTUATION INHALER    INHALE 2 PUFFS BY MOUTH EVERY 6 HOURS AS NEEDED FOR WHEEZE.  RESCUE    ZINC GLUCONATE 50 MG TABLET    Take 50 mg by mouth.   Changed and/or Refilled Medications    Modified Medication Previous Medication    ALPRAZOLAM (XANAX) 1 MG TABLET ALPRAZolam (XANAX) 1 MG tablet       Take 1 tablet (1 mg total) by mouth 3 (three) times daily.    Take 1 tablet (1 mg total) by mouth 3 (three) times daily.       Pedro Pablo Dc M.D.     ==========================================================================  Subjective:      Patient ID: Andrés Casillas is a 70 y.o. male.  has a past medical history of Anxiety, BPH (benign prostatic hypertrophy), DDD (degenerative disc disease), lumbar, Hypertension, and GAURANG (obstructive sleep apnea).     Chief Complaint: Establish Care and Medication Refill      Problem List Items Addressed This Visit     Hypertension - Primary (Chronic)    Overview     CHRONIC. STABLE. BP Reviewed.  Compliant with BP medications. No SE reported.   July 2020:  Compliant with lisinopril 20 mg daily.  No cough.  (-) CP, SOB, palpitations, dizziness, lightheadedness, HA, arm numbness, tingling or weakness, syncope.  Creatinine   Date Value Ref Range Status   07/14/2020 1.0 0.5 - 1.4 mg/dL Final              Current Assessment & Plan     Continue lisinopril.  Blood pressure is less than goal on recheck.Discussed hypertension disease course, DASH-diet and exercise.  Discussed medication regimen importance of treating high blood pressure.  Patient advised of risk of untreated blood pressure.    ER precautions were given for symptoms of hypertensive urgency and emergency.           Anxiety    Overview     Chronic.  Stable.  Patient on chronic benzodiazepine from previous PCP.  Transitioning care to me.The patient was checked in the [facility] [employer] [facility] [employer]acy's  Prescription Monitoring Program. There appears to be no incongruities with the patient's verbalized history.   No abuse suspected.         Current Assessment & Plan     Refill Xanax for six months.   Follow-up in six months.  Discussed condition course and signs and symptoms to expect.  Patient advised of risk and benefits of medication use.  ER precautions.  Call MD or follow-up to clinic if not improving or worsening symptoms.           Hypotestosteronemia    Overview     Chronic.  Patient reports that this condition is stable.  Managed by Urology.  Patient is using injections currently.    Lab Results   Component Value Date    WBC 4.64 07/14/2020    HGB 16.7 07/14/2020    HCT 51.6 07/14/2020    MCV 97 07/14/2020     07/14/2020                Current Assessment & Plan     No records or levels available recently.  Will request records from his urologist.         Encounter for long-term (current) use of medications    Overview     CHRONIC. Stable. Compliant with medications for managed conditions. See medication list. No SE reported.   Routine lab analysis is being monitored. Refills were addressed.  Lab Results   Component Value Date    WBC 4.64 07/14/2020    HGB 16.7 07/14/2020    HCT 51.6 07/14/2020    MCV 97 07/14/2020     07/14/2020         Chemistry        Component Value Date/Time     07/14/2020 1147    K 4.4 07/14/2020 1147     07/14/2020 1147    CO2 27 07/14/2020 1147    BUN 19 07/14/2020 1147    CREATININE 1.0 07/14/2020 1147    GLU 86 07/14/2020 1147        Component Value Date/Time    CALCIUM 9.9 07/14/2020 1147    ALKPHOS 102 07/14/2020 1147    AST 27 07/14/2020 1147    ALT 32 07/14/2020 1147    BILITOT 0.7 07/14/2020 1147    ESTGFRAFRICA >60.0 07/14/2020 1147    EGFRNONAA >60.0 07/14/2020 1147          Lab Results   Component Value Date    TSH 1.782 07/14/2020    O6AAKXZ 6.5 06/30/2011              Current Assessment & Plan     Baseline labs are stable.  Recheck in one year.Complete history and physical was completed today.  Complete and thorough medication reconciliation was performed.  Discussed risks and benefits of medications.  Advised patient on orders and health  maintenance.  We discussed old records and old labs if available.  Will request any records not available through epic.  Continue current medications listed on your summary sheet.           Benzodiazepine dependence, continuous    Overview     Chronic.  Uncontrolled.   reviewed.  On long-term Xanax as needed for panic attacks and anxiety           Other Visit Diagnoses     Encounter for lipid screening for cardiovascular disease        Encounter for prostate cancer screening        Encounter for general adult medical examination with abnormal findings               Past Medical History:  Past Medical History:   Diagnosis Date    Anxiety     BPH (benign prostatic hypertrophy)     DDD (degenerative disc disease), lumbar     Hypertension     GAURANG (obstructive sleep apnea)      Past Surgical History:   Procedure Laterality Date    JOINT REPLACEMENT       Review of patient's allergies indicates:   Allergen Reactions    No known drug allergies      Medication List with Changes/Refills   Current Medications    ASPIRIN 325 MG TABLET    Take 325 mg by mouth.    CO-ENZYME Q-10 30 MG CAPSULE    Take 30 mg by mouth once daily.     FISH OIL-OMEGA-3 FATTY ACIDS 300-1,000 MG CAPSULE    Take 1 capsule by mouth once daily.     FUROSEMIDE (LASIX) 40 MG TABLET    TAKE 1 TABLET BY MOUTH DAILY AS NEEDED FOR FLUID RETENSION    GABAPENTIN (NEURONTIN) 300 MG CAPSULE    Take 300 mg by mouth.    LISINOPRIL (PRINIVIL,ZESTRIL) 20 MG TABLET    Take 1 tablet (20 mg total) by mouth once daily.    MAGNESIUM OXIDE (MAG-OX) 400 MG (241.3 MG MAGNESIUM) TABLET    Take 400 mg by mouth once daily.     TAMSULOSIN (FLOMAX) 0.4 MG CP24    0.4 mg.     TESTOSTERONE CYPIONATE (DEPOTESTOTERONE CYPIONATE) 100 MG/ML INJECTION    Inject into the muscle.    VENTOLIN HFA 90 MCG/ACTUATION INHALER    INHALE 2 PUFFS BY MOUTH EVERY 6 HOURS AS NEEDED FOR WHEEZE. RESCUE    ZINC GLUCONATE 50 MG TABLET    Take 50 mg by mouth.   Changed and/or Refilled Medications  "   Modified Medication Previous Medication    ALPRAZOLAM (XANAX) 1 MG TABLET ALPRAZolam (XANAX) 1 MG tablet       Take 1 tablet (1 mg total) by mouth 3 (three) times daily.    Take 1 tablet (1 mg total) by mouth 3 (three) times daily.      Social History     Tobacco Use    Smoking status: Never Smoker    Smokeless tobacco: Never Used   Substance Use Topics    Alcohol use: Yes     Alcohol/week: 1.7 standard drinks     Types: 2 Standard drinks or equivalent per week     Comment: socially      Family History   Problem Relation Age of Onset    Heart disease Mother     Cancer Mother     Heart disease Father     Prostate cancer Brother        I have reviewed the complete PMH, social history, surgical history, allergies and medications.  As well as family history.    Review of Systems   Constitutional: Negative for activity change and fatigue.   HENT: Negative for congestion and sinus pain.    Eyes: Negative for visual disturbance.   Respiratory: Negative for chest tightness and shortness of breath.    Cardiovascular: Negative for palpitations and leg swelling.   Gastrointestinal: Negative for abdominal pain, diarrhea and nausea.   Endocrine: Negative for polyuria.   Genitourinary: Negative for difficulty urinating and frequency.   Musculoskeletal: Positive for arthralgias. Negative for joint swelling.   Skin: Negative for rash.   Neurological: Negative for dizziness and headaches.   Psychiatric/Behavioral: Negative for agitation. The patient is not nervous/anxious.      Objective:   /88   Pulse 78   Temp 97.9 °F (36.6 °C) (Oral)   Ht 5' 11" (1.803 m)   Wt 99.2 kg (218 lb 9.6 oz)   BMI 30.49 kg/m²   Physical Exam  Vitals signs and nursing note reviewed.   Constitutional:       General: He is not in acute distress.     Appearance: He is well-developed.   HENT:      Head: Normocephalic and atraumatic.   Eyes:      Pupils: Pupils are equal, round, and reactive to light.   Neck:      Musculoskeletal: Normal " range of motion and neck supple.   Cardiovascular:      Rate and Rhythm: Normal rate and regular rhythm.      Heart sounds: Normal heart sounds. No murmur.   Pulmonary:      Effort: Pulmonary effort is normal. No respiratory distress.      Breath sounds: Normal breath sounds. No wheezing.   Musculoskeletal: Normal range of motion.         General: Tenderness present.   Skin:     General: Skin is warm and dry.      Capillary Refill: Capillary refill takes less than 2 seconds.   Neurological:      Mental Status: He is alert and oriented to person, place, and time.      Cranial Nerves: No cranial nerve deficit.   Psychiatric:         Behavior: Behavior normal.         Assessment:     1. Essential hypertension    2. Hypotestosteronemia    3. Encounter for long-term (current) use of medications    4. Anxiety    5. Encounter for lipid screening for cardiovascular disease    6. Encounter for prostate cancer screening    7. Encounter for general adult medical examination with abnormal findings    8. Benzodiazepine dependence, continuous      MDM:   Moderate complexity.  Moderate risk.  I have Reviewed and summarized old records.  I have performed thorough medication reconciliation today and discussed risk and benefits of each medication.  I have reviewed labs and discussed with patient.  All questions were answered.  I am requesting old records and will review them once they are available.Previous PCP: Dr. Cabrera Alan  Specialists: Urology: Dr. Omar Dowling: Dr. Rich RichardsCardiology: Dr. Tyshawn Viera Medicine:  Dr. Santacruz ENT: Dr. Allen    I have signed for the following orders AND/OR meds.  Orders Placed This Encounter   Procedures    CBC Without Differential     Standing Status:   Standing     Number of Occurrences:   99     Standing Expiration Date:   9/11/2021    TSH     Standing Status:   Standing     Number of Occurrences:   99     Standing Expiration Date:   9/11/2021    Comprehensive metabolic  panel     Standing Status:   Standing     Number of Occurrences:   99     Standing Expiration Date:   7/8/2040    Hemoglobin A1C     Standing Status:   Standing     Number of Occurrences:   99     Standing Expiration Date:   7/8/2040    Lipid Panel     Standing Status:   Standing     Number of Occurrences:   99     Standing Expiration Date:   7/8/2040    PSA, Screening     Standing Status:   Future     Number of Occurrences:   1     Standing Expiration Date:   9/11/2021     Medications Ordered This Encounter   Medications    ALPRAZolam (XANAX) 1 MG tablet     Sig: Take 1 tablet (1 mg total) by mouth 3 (three) times daily.     Dispense:  90 tablet     Refill:  5        Follow up in about 6 months (around 1/13/2021), or if symptoms worsen or fail to improve, for Med refills, LAB RESULTS.    If no improvement in symptoms or symptoms worsen, advised to call/follow-up at clinic or go to ER. Patient voiced understanding and all questions/concerns were addressed.     DISCLAIMER: This note was compiled by using a speech recognition dictation system and therefore please be aware that typographical / speech recognition errors can and do occur.  Please contact me if you see any errors specifically.    Pedro Pablo Dc M.D.       Office: [phone]   [address] [place] [address]  FAX: [phone]

## 2020-07-13 NOTE — PATIENT INSTRUCTIONS
Follow up in about 6 months (around 1/13/2021), or if symptoms worsen or fail to improve, for Med refills, LAB RESULTS.     If no improvement in symptoms or symptoms worsen, please be advised to call MD, follow-up at clinic and/or go to ER if becomes severe.    Pedro Pablo Dc M.D.        We Offer TELEHEALTH & Same Day Appointments!   Book your Telehealth appointment with me through my nurse or   Clinic appointments on [employer]!    [address] [place] [address]    Office: [phone]   FAX: [phone]    Check out my Facebook Page and Follow Me at: [email or web address]    Check out my website at [email or web address] by clicking on: [email or web address]    To Schedule appointments online, go to [employer]: [email or web address]

## 2020-07-14 ENCOUNTER — LAB VISIT (OUTPATIENT)
Dept: LAB | Facility: HOSPITAL | Age: 70
End: 2020-07-14
Attending: FAMILY MEDICINE
Payer: MEDICARE

## 2020-07-14 DIAGNOSIS — Z00.01 ENCOUNTER FOR GENERAL ADULT MEDICAL EXAMINATION WITH ABNORMAL FINDINGS: ICD-10-CM

## 2020-07-14 DIAGNOSIS — Z13.220 ENCOUNTER FOR LIPID SCREENING FOR CARDIOVASCULAR DISEASE: ICD-10-CM

## 2020-07-14 DIAGNOSIS — F41.9 ANXIETY: ICD-10-CM

## 2020-07-14 DIAGNOSIS — Z12.5 ENCOUNTER FOR PROSTATE CANCER SCREENING: ICD-10-CM

## 2020-07-14 DIAGNOSIS — I10 ESSENTIAL HYPERTENSION: Chronic | ICD-10-CM

## 2020-07-14 DIAGNOSIS — E34.9 HYPOTESTOSTERONEMIA: ICD-10-CM

## 2020-07-14 DIAGNOSIS — Z13.6 ENCOUNTER FOR LIPID SCREENING FOR CARDIOVASCULAR DISEASE: ICD-10-CM

## 2020-07-14 DIAGNOSIS — Z79.899 ENCOUNTER FOR LONG-TERM (CURRENT) USE OF MEDICATIONS: ICD-10-CM

## 2020-07-14 LAB
ALBUMIN SERPL BCP-MCNC: 4.5 G/DL (ref 3.5–5.2)
ALP SERPL-CCNC: 102 U/L (ref 55–135)
ALT SERPL W/O P-5'-P-CCNC: 32 U/L (ref 10–44)
ANION GAP SERPL CALC-SCNC: 11 MMOL/L (ref 8–16)
AST SERPL-CCNC: 27 U/L (ref 10–40)
BILIRUB SERPL-MCNC: 0.7 MG/DL (ref 0.1–1)
BUN SERPL-MCNC: 19 MG/DL (ref 8–23)
CALCIUM SERPL-MCNC: 9.9 MG/DL (ref 8.7–10.5)
CHLORIDE SERPL-SCNC: 104 MMOL/L (ref 95–110)
CHOLEST SERPL-MCNC: 176 MG/DL (ref 120–199)
CHOLEST/HDLC SERPL: 3.1 {RATIO} (ref 2–5)
CO2 SERPL-SCNC: 27 MMOL/L (ref 23–29)
COMPLEXED PSA SERPL-MCNC: 2.9 NG/ML (ref 0–4)
CREAT SERPL-MCNC: 1 MG/DL (ref 0.5–1.4)
ERYTHROCYTE [DISTWIDTH] IN BLOOD BY AUTOMATED COUNT: 13.2 % (ref 11.5–14.5)
EST. GFR  (AFRICAN AMERICAN): >60 ML/MIN/1.73 M^2
EST. GFR  (NON AFRICAN AMERICAN): >60 ML/MIN/1.73 M^2
GLUCOSE SERPL-MCNC: 86 MG/DL (ref 70–110)
HCT VFR BLD AUTO: 51.6 % (ref 40–54)
HDLC SERPL-MCNC: 57 MG/DL (ref 40–75)
HDLC SERPL: 32.4 % (ref 20–50)
HGB BLD-MCNC: 16.7 G/DL (ref 14–18)
LDLC SERPL CALC-MCNC: 108.2 MG/DL (ref 63–159)
MCH RBC QN AUTO: 31.3 PG (ref 27–31)
MCHC RBC AUTO-ENTMCNC: 32.4 G/DL (ref 32–36)
MCV RBC AUTO: 97 FL (ref 82–98)
NONHDLC SERPL-MCNC: 119 MG/DL
PLATELET # BLD AUTO: 226 K/UL (ref 150–350)
PMV BLD AUTO: 10.8 FL (ref 9.2–12.9)
POTASSIUM SERPL-SCNC: 4.4 MMOL/L (ref 3.5–5.1)
PROT SERPL-MCNC: 7.7 G/DL (ref 6–8.4)
RBC # BLD AUTO: 5.34 M/UL (ref 4.6–6.2)
SODIUM SERPL-SCNC: 142 MMOL/L (ref 136–145)
TRIGL SERPL-MCNC: 54 MG/DL (ref 30–150)
TSH SERPL DL<=0.005 MIU/L-ACNC: 1.78 UIU/ML (ref 0.4–4)
WBC # BLD AUTO: 4.64 K/UL (ref 3.9–12.7)

## 2020-07-14 PROCEDURE — 36415 COLL VENOUS BLD VENIPUNCTURE: CPT | Mod: HCNC,PO

## 2020-07-14 PROCEDURE — 85027 COMPLETE CBC AUTOMATED: CPT | Mod: HCNC

## 2020-07-14 PROCEDURE — 84153 ASSAY OF PSA TOTAL: CPT | Mod: HCNC

## 2020-07-14 PROCEDURE — 80053 COMPREHEN METABOLIC PANEL: CPT | Mod: HCNC

## 2020-07-14 PROCEDURE — 80061 LIPID PANEL: CPT | Mod: HCNC

## 2020-07-14 PROCEDURE — 83036 HEMOGLOBIN GLYCOSYLATED A1C: CPT | Mod: HCNC

## 2020-07-14 PROCEDURE — 84443 ASSAY THYROID STIM HORMONE: CPT | Mod: HCNC

## 2020-07-15 PROBLEM — F13.20 BENZODIAZEPINE DEPENDENCE, CONTINUOUS: Status: ACTIVE | Noted: 2020-07-15

## 2020-07-15 LAB
ESTIMATED AVG GLUCOSE: 103 MG/DL (ref 68–131)
HBA1C MFR BLD HPLC: 5.2 % (ref 4–5.6)

## 2020-07-15 NOTE — ASSESSMENT & PLAN NOTE
Refill Xanax for six months.  Follow-up in six months.  Discussed condition course and signs and symptoms to expect.  Patient advised of risk and benefits of medication use.  ER precautions.  Call MD or follow-up to clinic if not improving or worsening symptoms.

## 2020-07-15 NOTE — ASSESSMENT & PLAN NOTE
Continue lisinopril.  Blood pressure is less than goal on recheck.Discussed hypertension disease course, DASH-diet and exercise.  Discussed medication regimen importance of treating high blood pressure.  Patient advised of risk of untreated blood pressure.    ER precautions were given for symptoms of hypertensive urgency and emergency.

## 2020-07-15 NOTE — PROGRESS NOTES
Please CALL patient with results and Document verification.   [phone]    Annual wellness labs are stable and within normal limits.  Repeat in one year.

## 2020-07-15 NOTE — ASSESSMENT & PLAN NOTE
Baseline labs are stable.  Recheck in one year.Complete history and physical was completed today.  Complete and thorough medication reconciliation was performed.  Discussed risks and benefits of medications.  Advised patient on orders and health maintenance.  We discussed old records and old labs if available.  Will request any records not available through epic.  Continue current medications listed on your summary sheet.

## 2020-08-06 DIAGNOSIS — F41.9 ANXIETY: ICD-10-CM

## 2020-08-06 DIAGNOSIS — Z79.899 ENCOUNTER FOR LONG-TERM (CURRENT) USE OF MEDICATIONS: ICD-10-CM

## 2020-08-06 RX ORDER — ALPRAZOLAM 1 MG/1
1 TABLET ORAL 3 TIMES DAILY
Qty: 90 TABLET | Refills: 5 | Status: SHIPPED | OUTPATIENT
Start: 2020-08-06 | End: 2021-02-09 | Stop reason: SDUPTHER

## 2020-08-06 NOTE — TELEPHONE ENCOUNTER
----- Message from Jackelin Machado sent at 8/6/2020  9:55 AM CDT -----  .Type:  RX Refill Request    Who Called: Andrés Casillas  Refill or New Rx:refill  RX Name and Strength:Alpralozam  How is the patient currently taking it? (ex. 1XDay):Daily  Is this a 30 day or 90 day RX:30  Preferred Pharmacy with phone number:.  [facility]/pharmacy #[id number] [place], [place] - [address] [address]  Phone: [phone] Fax: [phone]      Local or Mail Order:local  Ordering Provider:Dr. Dc  Would the patient rather a call back or a response via [facility]? Call back  Best Call Back Number:[phone]  Additional Information:

## 2020-08-06 NOTE — TELEPHONE ENCOUNTER
----- Message from Jackelin Machado sent at 8/6/2020  9:55 AM CDT -----  .Type:  RX Refill Request    Who Called: Andrés Casillas  Refill or New Rx:refill  RX Name and Strength:Alpralozam  How is the patient currently taking it? (ex. 1XDay):Daily  Is this a 30 day or 90 day RX:30  Preferred Pharmacy with phone number:.  [facility]/pharmacy #[id number] [place], [place] - [address] [address]  Phone: [phone] Fax: [phone]      Local or Mail Order:local  Ordering Provider:Dr. Dc  Would the patient rather a call back or a response via [facility]? Call back  Best Call Back Number:[phone]  Additional Information:

## 2020-10-15 ENCOUNTER — PATIENT MESSAGE (OUTPATIENT)
Dept: FAMILY MEDICINE | Facility: CLINIC | Age: 70
End: 2020-10-15

## 2020-10-15 ENCOUNTER — TELEPHONE (OUTPATIENT)
Dept: FAMILY MEDICINE | Facility: CLINIC | Age: 70
End: 2020-10-15

## 2020-10-15 DIAGNOSIS — E53.8 VITAMIN B12 DEFICIENCY: ICD-10-CM

## 2020-10-15 DIAGNOSIS — Z12.5 ENCOUNTER FOR PROSTATE CANCER SCREENING: ICD-10-CM

## 2020-10-15 DIAGNOSIS — E34.9 HYPOTESTOSTERONEMIA: Primary | ICD-10-CM

## 2020-10-15 DIAGNOSIS — E55.9 VITAMIN D DEFICIENCY: ICD-10-CM

## 2020-10-15 DIAGNOSIS — Z79.890 HORMONE REPLACEMENT THERAPY: ICD-10-CM

## 2020-10-15 NOTE — PROGRESS NOTES
PLAN:      Problem List Items Addressed This Visit     Palpitation - Primary (Chronic)     Patient with concerning abnormal chest pain/sensation.  Abnormal EKG.  Same day appointment with Cardiology was set.  ER precautions.  Patient is taking aspirin daily.         Relevant Orders    IN OFFICE EKG 12-LEAD (to [place])    Ambulatory referral/consult to Cardiology    Ganglion cyst of wrist, right     Continue monitoring.  Patient will notify me if becoming enlarged or painful.  Referral to orthopedics at that time.         Epigastric pain    Abnormal ECG        Future Appointments     Date Provider Specialty Appt Notes    11/4/2020  Cardiology 48 holter     11/16/2020  Cardiology stess echo cfd    11/20/2020 Bj Lock MD Cardiology f/u after test    1/13/2021 Pedro Pablo Dc MD Family Medicine 6 mo FU           Medication List with Changes/Refills   Current Medications    ALPRAZOLAM (XANAX) 1 MG TABLET    Take 1 tablet (1 mg total) by mouth 3 (three) times daily.    ASPIRIN 325 MG TABLET    Take 325 mg by mouth.    CO-ENZYME Q-10 30 MG CAPSULE    Take 30 mg by mouth once daily.     FISH OIL-OMEGA-3 FATTY ACIDS 300-1,000 MG CAPSULE    Take 1 capsule by mouth once daily.     FUROSEMIDE (LASIX) 40 MG TABLET    TAKE 1 TABLET BY MOUTH DAILY AS NEEDED FOR FLUID RETENSION    GABAPENTIN (NEURONTIN) 300 MG CAPSULE    Take 300 mg by mouth.    LISINOPRIL (PRINIVIL,ZESTRIL) 20 MG TABLET    Take 1 tablet (20 mg total) by mouth once daily.    MAGNESIUM OXIDE (MAG-OX) 400 MG (241.3 MG MAGNESIUM) TABLET    Take 400 mg by mouth once daily.     TAMSULOSIN (FLOMAX) 0.4 MG CP24    0.4 mg.     TESTOSTERONE CYPIONATE (DEPOTESTOTERONE CYPIONATE) 100 MG/ML INJECTION    Inject into the muscle.    VENTOLIN HFA 90 MCG/ACTUATION INHALER    INHALE 2 PUFFS BY MOUTH EVERY 6 HOURS AS NEEDED FOR WHEEZE. RESCUE    ZINC GLUCONATE 50 MG TABLET    Take 50 mg by mouth.       Pedro Pablo Dc M.D.      ==========================================================================  Subjective:      Patient ID: Andrés Casillas is a 70 y.o. male.  has a past medical history of Anxiety, BPH (benign prostatic hypertrophy), DDD (degenerative disc disease), lumbar, Hypertension, and GAURANG (obstructive sleep apnea).     Chief Complaint: Follow-up (discomfort on left upper chest underneath breast bone), Palpitations, and Cyst (right wrist)      Problem List Items Addressed This Visit     Palpitation - Primary (Chronic)    Overview     Subacute.  Patient states that he has been feeling and abnormal twitching sensation under his left breast/chest.  Patient denies any chest pain but does have an abnormal sensation.  Patient is concerned that it may be a hiatal hernia?  He has had reflux in the past but reports this sensation is different.  Patient does exercise regularly and lifts weights.  Patient reports that sometimes it is tender to palpation.    Results for orders placed or performed in visit on 10/16/20   IN OFFICE EKG 12-LEAD (to [place])    Collection Time: 10/16/20 10:44 AM    Narrative    Test Reason :     Vent. Rate : 066 BPM     Atrial Rate : 066 BPM     P-R Int : 172 ms          QRS Dur : 146 ms      QT Int : 380 ms       P-R-T Axes : 038 -57 030 degrees     QTc Int : 398 ms    Normal sinus rhythm  Right bundle branch block  Left anterior fascicular block   Bifascicular block   Moderate voltage criteria for LVH, may be normal variant  Cannot rule out Septal infarct ,age undetermined  Abnormal ECG  When compared with ECG of 01-DEC-2011 12:57,  Previous ECG has undetermined rhythm, needs review  (RBBB and left anterior fascicular block) is now Present  Minimal criteria for Septal infarct are now Present    Referred By: DIOGENES WALDRON           Confirmed By:               Current Assessment & Plan     Patient with concerning abnormal chest pain/sensation.  Abnormal EKG.  Same day appointment with Cardiology was set.  ER  precautions.  Patient is taking aspirin daily.         Ganglion cyst of wrist, right    Overview     Chronic.  Recurrent.  Patient reports that the cyst on his right wrist was swollen few weeks ago and was painful.  Currently it has gone back down and is not bothering him but is still present.  Patient does not want anything done with it today but just wanted noted.  Just in case he needs a referral.         Current Assessment & Plan     Continue monitoring.  Patient will notify me if becoming enlarged or painful.  Referral to orthopedics at that time.         Epigastric pain    Overview     See palpitation problem.         Abnormal ECG           Past Medical History:  Past Medical History:   Diagnosis Date    Anxiety     BPH (benign prostatic hypertrophy)     DDD (degenerative disc disease), lumbar     Hypertension     GAURANG (obstructive sleep apnea)      Past Surgical History:   Procedure Laterality Date    JOINT REPLACEMENT       Review of patient's allergies indicates:   Allergen Reactions    No known drug allergies      Medication List with Changes/Refills   Current Medications    ALPRAZOLAM (XANAX) 1 MG TABLET    Take 1 tablet (1 mg total) by mouth 3 (three) times daily.    ASPIRIN 325 MG TABLET    Take 325 mg by mouth.    CO-ENZYME Q-10 30 MG CAPSULE    Take 30 mg by mouth once daily.     FISH OIL-OMEGA-3 FATTY ACIDS 300-1,000 MG CAPSULE    Take 1 capsule by mouth once daily.     FUROSEMIDE (LASIX) 40 MG TABLET    TAKE 1 TABLET BY MOUTH DAILY AS NEEDED FOR FLUID RETENSION    GABAPENTIN (NEURONTIN) 300 MG CAPSULE    Take 300 mg by mouth.    LISINOPRIL (PRINIVIL,ZESTRIL) 20 MG TABLET    Take 1 tablet (20 mg total) by mouth once daily.    MAGNESIUM OXIDE (MAG-OX) 400 MG (241.3 MG MAGNESIUM) TABLET    Take 400 mg by mouth once daily.     TAMSULOSIN (FLOMAX) 0.4 MG CP24    0.4 mg.     TESTOSTERONE CYPIONATE (DEPOTESTOTERONE CYPIONATE) 100 MG/ML INJECTION    Inject into the muscle.    VENTOLIN HFA 90  "MCG/ACTUATION INHALER    INHALE 2 PUFFS BY MOUTH EVERY 6 HOURS AS NEEDED FOR WHEEZE. RESCUE    ZINC GLUCONATE 50 MG TABLET    Take 50 mg by mouth.      Social History     Tobacco Use    Smoking status: Never Smoker    Smokeless tobacco: Never Used   Substance Use Topics    Alcohol use: Yes     Alcohol/week: 1.7 standard drinks     Types: 2 Standard drinks or equivalent per week     Comment: socially      Family History   Problem Relation Age of Onset    Heart disease Mother     Cancer Mother     Heart disease Father     Prostate cancer Brother        I have reviewed the complete PMH, social history, surgical history, allergies and medications.  As well as family history.    Review of Systems   Constitutional: Negative for activity change and fatigue.   HENT: Negative for congestion and sinus pain.    Eyes: Negative for visual disturbance.   Respiratory: Negative for chest tightness and shortness of breath.    Cardiovascular: Positive for palpitations. Negative for leg swelling.   Gastrointestinal: Negative for abdominal pain, diarrhea and nausea.   Endocrine: Negative for polyuria.   Genitourinary: Negative for difficulty urinating and frequency.   Musculoskeletal: Negative for arthralgias and joint swelling.   Skin: Negative for rash.        Cyst right wrist   Neurological: Negative for dizziness and headaches.   Psychiatric/Behavioral: Negative for agitation. The patient is not nervous/anxious.      Objective:   /79   Pulse 71   Temp 98.1 °F (36.7 °C) (Temporal)   Ht 5' 11" (1.803 m)   Wt 100.2 kg (220 lb 12.8 oz)   BMI 30.80 kg/m²   Physical Exam  Vitals signs and nursing note reviewed.   Constitutional:       General: He is not in acute distress.     Appearance: He is well-developed.   HENT:      Head: Normocephalic and atraumatic.   Eyes:      Pupils: Pupils are equal, round, and reactive to light.   Neck:      Musculoskeletal: Normal range of motion and neck supple.   Cardiovascular:      " Rate and Rhythm: Normal rate.      Pulses: Normal pulses.      Heart sounds: Normal heart sounds.   Pulmonary:      Effort: Pulmonary effort is normal. No respiratory distress.      Breath sounds: Normal breath sounds. No wheezing.   Musculoskeletal: Normal range of motion.   Skin:     General: Skin is warm and dry.      Capillary Refill: Capillary refill takes less than 2 seconds.   Neurological:      Mental Status: He is alert and oriented to person, place, and time.      Cranial Nerves: No cranial nerve deficit.   Psychiatric:         Mood and Affect: Mood is anxious.         Behavior: Behavior normal.       Lab Results   Component Value Date    WBC 4.81 10/16/2020    HGB 17.4 10/16/2020    HCT 53.9 10/16/2020    MCV 96 10/16/2020     10/16/2020         Chemistry        Component Value Date/Time     10/16/2020 1053    K 4.7 10/16/2020 1053     10/16/2020 1053    CO2 27 10/16/2020 1053    BUN 25 (H) 10/16/2020 1053    CREATININE 1.0 10/16/2020 1053     10/16/2020 1053        Component Value Date/Time    CALCIUM 9.4 10/16/2020 1053    ALKPHOS 104 10/16/2020 1053    AST 27 10/16/2020 1053    ALT 36 10/16/2020 1053    BILITOT 0.8 10/16/2020 1053    ESTGFRAFRICA >60.0 10/16/2020 1053    EGFRNONAA >60.0 10/16/2020 1053          Lab Results   Component Value Date    TSH 2.461 10/16/2020    P0ONXUM 6.5 06/30/2011         Assessment:     1. Palpitation    2. Ganglion cyst of wrist, right    3. Epigastric pain    4. Abnormal ECG      MDM:   Moderate complexity.  Moderate risk.  I have Reviewed and summarized old records.  I have performed thorough medication reconciliation today and discussed risk and benefits of each medication.  I have reviewed EKG come labs and discussed with patient.  All questions were answered.  I am requesting old records and will review them once they are available.  Cardiology    I have signed for the following orders AND/OR meds.  Orders Placed This Encounter    Procedures    Ambulatory referral/consult to Cardiology     Standing Status:   Future     Number of Occurrences:   1     Standing Expiration Date:   11/16/2021     Referral Priority:   Urgent     Referral Type:   Consultation     Referral Reason:   Specialty Services Required     Requested Specialty:   Cardiology     Number of Visits Requested:   1    IN OFFICE EKG 12-LEAD (to [place])     Order Specific Question:   Diagnosis     Answer:   Palpitation [[id number]]           Follow up in about 6 months (around 4/16/2021), or if symptoms worsen or fail to improve, for Med refills, LAB RESULTS.    If no improvement in symptoms or symptoms worsen, advised to call/follow-up at clinic or go to ER. Patient voiced understanding and all questions/concerns were addressed.     DISCLAIMER: This note was compiled by using a speech recognition dictation system and therefore please be aware that typographical / speech recognition errors can and do occur.  Please contact me if you see any errors specifically.    Pedro Pablo Dc M.D.       Office: [phone]   [address] [place] [address]  FAX: [phone]

## 2020-10-15 NOTE — TELEPHONE ENCOUNTER
Spoke with patient and he states that he would like to have some additional labs done during his visit tomorrow.  I explained to him that he could discuss this with Dr. Dc during his visit.

## 2020-10-15 NOTE — TELEPHONE ENCOUNTER
----- Message from Belkis Schroeder sent at 10/15/2020  3:38 PM CDT -----  Regarding: request call back  Pt request call back regarding a few additional labs needing done ... call back:  [phone]

## 2020-10-16 ENCOUNTER — LAB VISIT (OUTPATIENT)
Dept: LAB | Facility: HOSPITAL | Age: 70
End: 2020-10-16
Attending: FAMILY MEDICINE
Payer: MEDICARE

## 2020-10-16 ENCOUNTER — OFFICE VISIT (OUTPATIENT)
Dept: CARDIOLOGY | Facility: CLINIC | Age: 70
End: 2020-10-16
Payer: MEDICARE

## 2020-10-16 ENCOUNTER — OFFICE VISIT (OUTPATIENT)
Dept: FAMILY MEDICINE | Facility: CLINIC | Age: 70
End: 2020-10-16
Payer: MEDICARE

## 2020-10-16 VITALS
TEMPERATURE: 98 F | WEIGHT: 220.81 LBS | BODY MASS INDEX: 30.91 KG/M2 | SYSTOLIC BLOOD PRESSURE: 125 MMHG | HEART RATE: 71 BPM | DIASTOLIC BLOOD PRESSURE: 79 MMHG | HEIGHT: 71 IN

## 2020-10-16 VITALS
WEIGHT: 220 LBS | OXYGEN SATURATION: 95 % | SYSTOLIC BLOOD PRESSURE: 140 MMHG | BODY MASS INDEX: 30.8 KG/M2 | HEART RATE: 75 BPM | HEIGHT: 71 IN | DIASTOLIC BLOOD PRESSURE: 78 MMHG

## 2020-10-16 DIAGNOSIS — Z79.890 HORMONE REPLACEMENT THERAPY: ICD-10-CM

## 2020-10-16 DIAGNOSIS — Z79.899 ENCOUNTER FOR LONG-TERM (CURRENT) USE OF MEDICATIONS: ICD-10-CM

## 2020-10-16 DIAGNOSIS — R94.31 ABNORMAL ECG: ICD-10-CM

## 2020-10-16 DIAGNOSIS — Z00.01 ENCOUNTER FOR GENERAL ADULT MEDICAL EXAMINATION WITH ABNORMAL FINDINGS: ICD-10-CM

## 2020-10-16 DIAGNOSIS — Z13.220 ENCOUNTER FOR LIPID SCREENING FOR CARDIOVASCULAR DISEASE: ICD-10-CM

## 2020-10-16 DIAGNOSIS — R10.13 EPIGASTRIC PAIN: ICD-10-CM

## 2020-10-16 DIAGNOSIS — E34.9 HYPOTESTOSTERONEMIA: ICD-10-CM

## 2020-10-16 DIAGNOSIS — R00.2 PALPITATION: Chronic | ICD-10-CM

## 2020-10-16 DIAGNOSIS — R94.31 ABNORMAL ECG: Primary | ICD-10-CM

## 2020-10-16 DIAGNOSIS — I45.10 RBBB: ICD-10-CM

## 2020-10-16 DIAGNOSIS — I10 ESSENTIAL HYPERTENSION: Chronic | ICD-10-CM

## 2020-10-16 DIAGNOSIS — E53.8 VITAMIN B12 DEFICIENCY: ICD-10-CM

## 2020-10-16 DIAGNOSIS — Z12.5 ENCOUNTER FOR PROSTATE CANCER SCREENING: ICD-10-CM

## 2020-10-16 DIAGNOSIS — M67.431 GANGLION CYST OF WRIST, RIGHT: ICD-10-CM

## 2020-10-16 DIAGNOSIS — R00.2 PALPITATION: ICD-10-CM

## 2020-10-16 DIAGNOSIS — F41.9 ANXIETY: ICD-10-CM

## 2020-10-16 DIAGNOSIS — R00.2 PALPITATION: Primary | ICD-10-CM

## 2020-10-16 DIAGNOSIS — E55.9 VITAMIN D DEFICIENCY: ICD-10-CM

## 2020-10-16 DIAGNOSIS — Z13.6 ENCOUNTER FOR LIPID SCREENING FOR CARDIOVASCULAR DISEASE: ICD-10-CM

## 2020-10-16 DIAGNOSIS — G47.33 OSA (OBSTRUCTIVE SLEEP APNEA): ICD-10-CM

## 2020-10-16 LAB
25(OH)D3+25(OH)D2 SERPL-MCNC: 84 NG/ML (ref 30–96)
ALBUMIN SERPL BCP-MCNC: 4.2 G/DL (ref 3.5–5.2)
ALP SERPL-CCNC: 104 U/L (ref 55–135)
ALT SERPL W/O P-5'-P-CCNC: 36 U/L (ref 10–44)
ANION GAP SERPL CALC-SCNC: 8 MMOL/L (ref 8–16)
AST SERPL-CCNC: 27 U/L (ref 10–40)
BILIRUB SERPL-MCNC: 0.8 MG/DL (ref 0.1–1)
BUN SERPL-MCNC: 25 MG/DL (ref 8–23)
CALCIUM SERPL-MCNC: 9.4 MG/DL (ref 8.7–10.5)
CHLORIDE SERPL-SCNC: 104 MMOL/L (ref 95–110)
CHOLEST SERPL-MCNC: 171 MG/DL (ref 120–199)
CHOLEST/HDLC SERPL: 3.4 {RATIO} (ref 2–5)
CO2 SERPL-SCNC: 27 MMOL/L (ref 23–29)
COMPLEXED PSA SERPL-MCNC: 2.9 NG/ML (ref 0–4)
CREAT SERPL-MCNC: 1 MG/DL (ref 0.5–1.4)
ERYTHROCYTE [DISTWIDTH] IN BLOOD BY AUTOMATED COUNT: 13.2 % (ref 11.5–14.5)
EST. GFR  (AFRICAN AMERICAN): >60 ML/MIN/1.73 M^2
EST. GFR  (NON AFRICAN AMERICAN): >60 ML/MIN/1.73 M^2
ESTIMATED AVG GLUCOSE: 94 MG/DL (ref 68–131)
ESTRADIOL SERPL-MCNC: 12 PG/ML (ref 11–44)
FSH SERPL-ACNC: 0.2 MIU/ML (ref 0.95–11.95)
GLUCOSE SERPL-MCNC: 101 MG/DL (ref 70–110)
HBA1C MFR BLD HPLC: 4.9 % (ref 4–5.6)
HCT VFR BLD AUTO: 53.9 % (ref 40–54)
HDLC SERPL-MCNC: 50 MG/DL (ref 40–75)
HDLC SERPL: 29.2 % (ref 20–50)
HGB BLD-MCNC: 17.4 G/DL (ref 14–18)
LDLC SERPL CALC-MCNC: 105.4 MG/DL (ref 63–159)
LH SERPL-ACNC: <0.2 MIU/ML (ref 0.6–12.1)
MCH RBC QN AUTO: 31.1 PG (ref 27–31)
MCHC RBC AUTO-ENTMCNC: 32.3 G/DL (ref 32–36)
MCV RBC AUTO: 96 FL (ref 82–98)
NONHDLC SERPL-MCNC: 121 MG/DL
PLATELET # BLD AUTO: 214 K/UL (ref 150–350)
PMV BLD AUTO: 10.7 FL (ref 9.2–12.9)
POTASSIUM SERPL-SCNC: 4.7 MMOL/L (ref 3.5–5.1)
PROLACTIN SERPL IA-MCNC: 8 NG/ML (ref 3.5–19.4)
PROT SERPL-MCNC: 7.2 G/DL (ref 6–8.4)
RBC # BLD AUTO: 5.6 M/UL (ref 4.6–6.2)
SODIUM SERPL-SCNC: 139 MMOL/L (ref 136–145)
TRIGL SERPL-MCNC: 78 MG/DL (ref 30–150)
TSH SERPL DL<=0.005 MIU/L-ACNC: 2.46 UIU/ML (ref 0.4–4)
VIT B12 SERPL-MCNC: 1081 PG/ML (ref 210–950)
WBC # BLD AUTO: 4.81 K/UL (ref 3.9–12.7)

## 2020-10-16 PROCEDURE — 36415 COLL VENOUS BLD VENIPUNCTURE: CPT | Mod: HCNC,PO

## 2020-10-16 PROCEDURE — 99214 OFFICE O/P EST MOD 30 MIN: CPT | Mod: HCNC,S$GLB,, | Performed by: FAMILY MEDICINE

## 2020-10-16 PROCEDURE — 82306 VITAMIN D 25 HYDROXY: CPT | Mod: HCNC

## 2020-10-16 PROCEDURE — 1126F AMNT PAIN NOTED NONE PRSNT: CPT | Mod: HCNC,S$GLB,, | Performed by: FAMILY MEDICINE

## 2020-10-16 PROCEDURE — 1101F PR PT FALLS ASSESS DOC 0-1 FALLS W/OUT INJ PAST YR: ICD-10-PCS | Mod: HCNC,CPTII,S$GLB, | Performed by: FAMILY MEDICINE

## 2020-10-16 PROCEDURE — 80053 COMPREHEN METABOLIC PANEL: CPT | Mod: HCNC

## 2020-10-16 PROCEDURE — 83001 ASSAY OF GONADOTROPIN (FSH): CPT | Mod: HCNC

## 2020-10-16 PROCEDURE — 99999 PR PBB SHADOW E&M-EST. PATIENT-LVL IV: ICD-10-PCS | Mod: PBBFAC,HCNC,, | Performed by: INTERNAL MEDICINE

## 2020-10-16 PROCEDURE — 3078F DIAST BP <80 MM HG: CPT | Mod: HCNC,CPTII,S$GLB, | Performed by: INTERNAL MEDICINE

## 2020-10-16 PROCEDURE — 3008F PR BODY MASS INDEX (BMI) DOCUMENTED: ICD-10-PCS | Mod: HCNC,CPTII,S$GLB, | Performed by: INTERNAL MEDICINE

## 2020-10-16 PROCEDURE — 80061 LIPID PANEL: CPT | Mod: HCNC

## 2020-10-16 PROCEDURE — 83002 ASSAY OF GONADOTROPIN (LH): CPT | Mod: HCNC

## 2020-10-16 PROCEDURE — 1159F MED LIST DOCD IN RCRD: CPT | Mod: HCNC,S$GLB,, | Performed by: FAMILY MEDICINE

## 2020-10-16 PROCEDURE — 1101F PT FALLS ASSESS-DOCD LE1/YR: CPT | Mod: HCNC,CPTII,S$GLB, | Performed by: INTERNAL MEDICINE

## 2020-10-16 PROCEDURE — 82670 ASSAY OF TOTAL ESTRADIOL: CPT | Mod: HCNC

## 2020-10-16 PROCEDURE — 1159F MED LIST DOCD IN RCRD: CPT | Mod: HCNC,S$GLB,, | Performed by: INTERNAL MEDICINE

## 2020-10-16 PROCEDURE — 3008F BODY MASS INDEX DOCD: CPT | Mod: HCNC,CPTII,S$GLB, | Performed by: INTERNAL MEDICINE

## 2020-10-16 PROCEDURE — 93005 ELECTROCARDIOGRAM TRACING: CPT | Mod: HCNC,S$GLB,, | Performed by: FAMILY MEDICINE

## 2020-10-16 PROCEDURE — 82626 DEHYDROEPIANDROSTERONE: CPT | Mod: HCNC

## 2020-10-16 PROCEDURE — 3008F PR BODY MASS INDEX (BMI) DOCUMENTED: ICD-10-PCS | Mod: HCNC,CPTII,S$GLB, | Performed by: FAMILY MEDICINE

## 2020-10-16 PROCEDURE — 3074F SYST BP LT 130 MM HG: CPT | Mod: HCNC,CPTII,S$GLB, | Performed by: FAMILY MEDICINE

## 2020-10-16 PROCEDURE — 1126F PR PAIN SEVERITY QUANTIFIED, NO PAIN PRESENT: ICD-10-PCS | Mod: HCNC,S$GLB,, | Performed by: FAMILY MEDICINE

## 2020-10-16 PROCEDURE — 93010 EKG 12-LEAD: ICD-10-PCS | Mod: HCNC,S$GLB,, | Performed by: INTERNAL MEDICINE

## 2020-10-16 PROCEDURE — 1159F PR MEDICATION LIST DOCUMENTED IN MEDICAL RECORD: ICD-10-PCS | Mod: HCNC,S$GLB,, | Performed by: FAMILY MEDICINE

## 2020-10-16 PROCEDURE — 83036 HEMOGLOBIN GLYCOSYLATED A1C: CPT | Mod: HCNC

## 2020-10-16 PROCEDURE — 99999 PR PBB SHADOW E&M-EST. PATIENT-LVL V: CPT | Mod: PBBFAC,HCNC,, | Performed by: FAMILY MEDICINE

## 2020-10-16 PROCEDURE — 1101F PR PT FALLS ASSESS DOC 0-1 FALLS W/OUT INJ PAST YR: ICD-10-PCS | Mod: HCNC,CPTII,S$GLB, | Performed by: INTERNAL MEDICINE

## 2020-10-16 PROCEDURE — 3078F PR MOST RECENT DIASTOLIC BLOOD PRESSURE < 80 MM HG: ICD-10-PCS | Mod: HCNC,CPTII,S$GLB, | Performed by: INTERNAL MEDICINE

## 2020-10-16 PROCEDURE — 84153 ASSAY OF PSA TOTAL: CPT | Mod: HCNC

## 2020-10-16 PROCEDURE — 99205 PR OFFICE/OUTPT VISIT, NEW, LEVL V, 60-74 MIN: ICD-10-PCS | Mod: HCNC,S$GLB,, | Performed by: INTERNAL MEDICINE

## 2020-10-16 PROCEDURE — 99999 PR PBB SHADOW E&M-EST. PATIENT-LVL IV: CPT | Mod: PBBFAC,HCNC,, | Performed by: INTERNAL MEDICINE

## 2020-10-16 PROCEDURE — 3078F PR MOST RECENT DIASTOLIC BLOOD PRESSURE < 80 MM HG: ICD-10-PCS | Mod: HCNC,CPTII,S$GLB, | Performed by: FAMILY MEDICINE

## 2020-10-16 PROCEDURE — 93010 ELECTROCARDIOGRAM REPORT: CPT | Mod: HCNC,S$GLB,, | Performed by: INTERNAL MEDICINE

## 2020-10-16 PROCEDURE — 1101F PT FALLS ASSESS-DOCD LE1/YR: CPT | Mod: HCNC,CPTII,S$GLB, | Performed by: FAMILY MEDICINE

## 2020-10-16 PROCEDURE — 3078F DIAST BP <80 MM HG: CPT | Mod: HCNC,CPTII,S$GLB, | Performed by: FAMILY MEDICINE

## 2020-10-16 PROCEDURE — 3074F PR MOST RECENT SYSTOLIC BLOOD PRESSURE < 130 MM HG: ICD-10-PCS | Mod: HCNC,CPTII,S$GLB, | Performed by: INTERNAL MEDICINE

## 2020-10-16 PROCEDURE — 84146 ASSAY OF PROLACTIN: CPT | Mod: HCNC

## 2020-10-16 PROCEDURE — 84443 ASSAY THYROID STIM HORMONE: CPT | Mod: HCNC

## 2020-10-16 PROCEDURE — 3008F BODY MASS INDEX DOCD: CPT | Mod: HCNC,CPTII,S$GLB, | Performed by: FAMILY MEDICINE

## 2020-10-16 PROCEDURE — 82607 VITAMIN B-12: CPT | Mod: HCNC

## 2020-10-16 PROCEDURE — 82040 ASSAY OF SERUM ALBUMIN: CPT | Mod: HCNC,91

## 2020-10-16 PROCEDURE — 99999 PR PBB SHADOW E&M-EST. PATIENT-LVL V: ICD-10-PCS | Mod: PBBFAC,HCNC,, | Performed by: FAMILY MEDICINE

## 2020-10-16 PROCEDURE — 99214 PR OFFICE/OUTPT VISIT, EST, LEVL IV, 30-39 MIN: ICD-10-PCS | Mod: HCNC,S$GLB,, | Performed by: FAMILY MEDICINE

## 2020-10-16 PROCEDURE — 93005 EKG 12-LEAD: ICD-10-PCS | Mod: HCNC,S$GLB,, | Performed by: FAMILY MEDICINE

## 2020-10-16 PROCEDURE — 3074F SYST BP LT 130 MM HG: CPT | Mod: HCNC,CPTII,S$GLB, | Performed by: INTERNAL MEDICINE

## 2020-10-16 PROCEDURE — 1159F PR MEDICATION LIST DOCUMENTED IN MEDICAL RECORD: ICD-10-PCS | Mod: HCNC,S$GLB,, | Performed by: INTERNAL MEDICINE

## 2020-10-16 PROCEDURE — 99205 OFFICE O/P NEW HI 60 MIN: CPT | Mod: HCNC,S$GLB,, | Performed by: INTERNAL MEDICINE

## 2020-10-16 PROCEDURE — 3074F PR MOST RECENT SYSTOLIC BLOOD PRESSURE < 130 MM HG: ICD-10-PCS | Mod: HCNC,CPTII,S$GLB, | Performed by: FAMILY MEDICINE

## 2020-10-16 PROCEDURE — 85027 COMPLETE CBC AUTOMATED: CPT | Mod: HCNC

## 2020-10-16 NOTE — PATIENT INSTRUCTIONS
Follow up in about 6 months (around 4/16/2021), or if symptoms worsen or fail to improve, for Med refills, LAB RESULTS.     If no improvement in symptoms or symptoms worsen, please be advised to call MD, follow-up at clinic and/or go to ER if becomes severe.    Pedro Pablo cD M.D.        We Offer TELEHEALTH & Same Day Appointments!   Book your Telehealth appointment with me through my nurse or   Clinic appointments on [employer]!    [address] [place] [address]    Office: [phone]   FAX: [phone]    Check out my Facebook Page and Follow Me at: [email or web address]    Check out my website at [employer] by clicking on: [email or web address]    To Schedule appointments online, go to [employer]: [email or web address]

## 2020-10-16 NOTE — TELEPHONE ENCOUNTER
I received your message which was reviewed along with the the medication list and allergies that we have below.  Please review it for accuracy to make sure that we have the most recent records on your history.     Based on this, the following orders were placed AND/OR medicines were sent in.     Orders Placed This Encounter   Procedures    DHEA     Standing Status:   Future     Standing Expiration Date:   12/14/2021    Estradiol     Standing Status:   Future     Standing Expiration Date:   12/14/2021    Vitamin B12     Standing Status:   Future     Standing Expiration Date:   12/14/2021    Vitamin D     Standing Status:   Future     Standing Expiration Date:   12/14/2021    PSA, Screening     Standing Status:   Future     Standing Expiration Date:   10/16/2021    Testosterone Panel     Standing Status:   Future     Standing Expiration Date:   12/15/2021    Follicle Stimulating Hormone     Standing Status:   Future     Standing Expiration Date:   12/15/2021    Luteinizing Hormone     Standing Status:   Future     Standing Expiration Date:   12/15/2021    Prolactin     Standing Status:   Future     Standing Expiration Date:   12/15/2021       Medications written and sent at this time include:       Your pharmacy(ies) of choice at this time on record include the list below and any medications would have been sent to the one at the top.    [facility]/pharmacy #[id number] - [place], [place] - [address] [address]  Phone: [phone] Fax: [phone]      Thank you for choosing us as your healthcare provider!  Dr. Pedro Pablo Dc    ALLERGY LIST  Review of patient's allergies indicates:   Allergen Reactions    No known drug allergies        MEDICATION LIST  Current Outpatient Medications on File Prior to Visit   Medication Sig Dispense Refill    ALPRAZolam (XANAX) 1 MG tablet Take 1 tablet (1 mg total) by mouth 3 (three) times daily. 90 tablet 5    aspirin 325 MG tablet Take 325 mg by  mouth.      co-enzyme Q-10 30 mg capsule Take 30 mg by mouth once daily.       fish oil-omega-3 fatty acids 300-1,000 mg capsule Take 1 capsule by mouth once daily.       furosemide (LASIX) 40 MG tablet TAKE 1 TABLET BY MOUTH DAILY AS NEEDED FOR FLUID RETENSION 90 tablet 4    gabapentin (NEURONTIN) 300 MG capsule Take 300 mg by mouth.      lisinopril (PRINIVIL,ZESTRIL) 20 MG tablet Take 1 tablet (20 mg total) by mouth once daily. 30 tablet 11    magnesium oxide (MAG-OX) 400 mg (241.3 mg magnesium) tablet Take 400 mg by mouth once daily.       tamsulosin (FLOMAX) 0.4 mg Cp24 0.4 mg.       testosterone cypionate (DEPOTESTOTERONE CYPIONATE) 100 mg/mL injection Inject into the muscle.      VENTOLIN HFA 90 mcg/actuation inhaler INHALE 2 PUFFS BY MOUTH EVERY 6 HOURS AS NEEDED FOR WHEEZE. RESCUE 18 Inhaler 12    zinc gluconate 50 mg tablet Take 50 mg by mouth.       No current facility-administered medications on file prior to visit.        HEALTH MAINTENANCE THAT IS OVERDUE OR NEEDS TO BE UPDATED ON OUR CHART IS LISTED BELOW.  IF YOU HAVE HAD IT DONE ELSEWHERE, PLEASE SEND US DATES AND RECORDS IF YOU HAVE THEM TO MAKE YOUR CHART ACCURATE.  IF YOU HAVE NOT HAD THESE DONE AND ARE READY FOR US TO SCHEDULE THEM, PLEASE SEND US A MESSAGE.  Health Maintenance Due   Topic Date Due    TETANUS VACCINE  03/29/1970    Shingles Vaccine (1 of 2) 03/29/2002    Pneumococcal Vaccine (65+ Low/Medium Risk) (1 of 2 - PCV13) 03/29/2017       DISCLAIMER: This note was compiled by using a speech recognition dictation system and therefore please be aware that typographical / speech recognition errors can and do occur.  Please contact me if you see any errors specifically.    Pedro Pablo Dc MD  We Offer Telehealth & Same Day Appointments!   Book your Telehealth appointment with me through my nurse or   Clinic appointments on [employer]O!  [phone]     Check out my Facebook Page and Follow Me at: CLICK HERE    Check out my  website at [facility] by clicking on: CLICK HERE    To Schedule appointments online, go to [email or web address]: CLICK HERE     Location: [email or web address]    [address] [place] [address]    FAX: [phone]

## 2020-10-16 NOTE — LETTER
October 16, 2020      Pedro Pablo Dc MD  [address] [facility]  [place] [address]           [facility] Cardiology  [address] [facility]  [place] [address]  Phone: [phone]  Fax: [phone]          Patient: Andrés Casillas   MR Number: [id number]   YOB: 1950   Date of Visit: 10/16/2020       Dear Dr. Pedro Pablo Dc:    Thank you for referring Andrés Casillas to me for evaluation. Attached you will find relevant portions of my assessment and plan of care.    If you have questions, please do not hesitate to call me. I look forward to following Andrés Casillas along with you.    Sincerely,    Bj Lock MD    Enclosure  CC:  No Recipients    If you would like to receive this communication electronically, please contact [email or web address] or [phone] to request more information on [employer] Link access.    For providers and/or their staff who would like to refer a patient to [facility], please contact us through our one-stop-shop provider referral line, [facility], at [phone].    If you feel you have received this communication in error or would no longer like to receive these types of communications, please e-mail [email or web address]

## 2020-10-16 NOTE — PROGRESS NOTES
Subjective:   Patient ID:  Andrés Casillas is a 70 y.o. male who presents for evaluation of No chief complaint on file.   This is a pleasant patient presenting with episodic palpitations.  Prior EKG in 2011 shows sinus rhythm left axis deviation. Today EKG shows RBBB and possible septal   MI.  Patient denies chest pain, angina or symptoms associated with anginal equivalent.  The patient is significant change in the EKG and this recent stress echo be done.  Also intermittent palpitations and we will do a Holter monitor.  Risk factors for coronary disease including family history father having heart attack.  Also history of hypertension.  This this is a pleasant patient who presents with intermittent palpitations    Family History   Problem Relation Age of Onset    Heart disease Mother     Cancer Mother     Heart disease Father     Prostate cancer Brother      Past Medical History:   Diagnosis Date    Anxiety     BPH (benign prostatic hypertrophy)     DDD (degenerative disc disease), lumbar     Hypertension     GAURANG (obstructive sleep apnea)      Social History     Socioeconomic History    Marital status: Single     Spouse name: Not on file    Number of children: Not on file    Years of education: Not on file    Highest education level: Not on file   Occupational History     Employer: [employer]   Social Needs    Financial resource strain: Not very hard    Food insecurity     Worry: Never true     Inability: Never true    Transportation needs     Medical: No     Non-medical: No   Tobacco Use    Smoking status: Never Smoker    Smokeless tobacco: Never Used   Substance and Sexual Activity    Alcohol use: Yes     Alcohol/week: 1.7 standard drinks     Types: 2 Standard drinks or equivalent per week     Comment: socially    Drug use: No    Sexual activity: Yes     Partners: Female   Lifestyle    Physical activity     Days per week: 5 days     Minutes per session: 30 min    Stress: To some extent    Relationships    Social connections     Talks on phone: More than three times a week     Gets together: More than three times a week     Attends Christianity service: More than 4 times per year     Active member of club or organization: No     Attends meetings of clubs or organizations: Never     Relationship status:    Other Topics Concern    Not on file   Social History Narrative    ** Merged History Encounter **          Current Outpatient Medications on File Prior to Visit   Medication Sig Dispense Refill    ALPRAZolam (XANAX) 1 MG tablet Take 1 tablet (1 mg total) by mouth 3 (three) times daily. 90 tablet 5    aspirin 325 MG tablet Take 325 mg by mouth.      co-enzyme Q-10 30 mg capsule Take 30 mg by mouth once daily.       fish oil-omega-3 fatty acids 300-1,000 mg capsule Take 1 capsule by mouth once daily.       furosemide (LASIX) 40 MG tablet TAKE 1 TABLET BY MOUTH DAILY AS NEEDED FOR FLUID RETENSION 90 tablet 4    gabapentin (NEURONTIN) 300 MG capsule Take 300 mg by mouth.      lisinopril (PRINIVIL,ZESTRIL) 20 MG tablet Take 1 tablet (20 mg total) by mouth once daily. 30 tablet 11    magnesium oxide (MAG-OX) 400 mg (241.3 mg magnesium) tablet Take 400 mg by mouth once daily.       tamsulosin (FLOMAX) 0.4 mg Cp24 0.4 mg.       testosterone cypionate (DEPOTESTOTERONE CYPIONATE) 100 mg/mL injection Inject into the muscle.      VENTOLIN HFA 90 mcg/actuation inhaler INHALE 2 PUFFS BY MOUTH EVERY 6 HOURS AS NEEDED FOR WHEEZE. RESCUE 18 Inhaler 12    zinc gluconate 50 mg tablet Take 50 mg by mouth.       No current facility-administered medications on file prior to visit.      Review of patient's allergies indicates:   Allergen Reactions    No known drug allergies        Review of Systems   Constitution: Negative for chills, diaphoresis, night sweats, weight gain and weight loss.   HENT: Negative for congestion, hoarse voice, sore throat and stridor.    Eyes: Negative for double vision and  pain.   Cardiovascular: Negative for chest pain, claudication, cyanosis, dyspnea on exertion, irregular heartbeat, leg swelling, near-syncope, orthopnea, palpitations, paroxysmal nocturnal dyspnea and syncope.   Respiratory: Negative for cough, hemoptysis, shortness of breath, sleep disturbances due to breathing, snoring, sputum production and wheezing.    Endocrine: Negative for cold intolerance, heat intolerance and polydipsia.   Hematologic/Lymphatic: Negative for bleeding problem. Does not bruise/bleed easily.   Skin: Negative for color change, dry skin and rash.   Musculoskeletal: Negative for joint swelling and muscle cramps.   Gastrointestinal: Negative for bloating, abdominal pain, constipation, diarrhea, dysphagia, melena, nausea and vomiting.   Genitourinary: Negative for flank pain and urgency.   Neurological: Negative for dizziness, focal weakness, headaches, light-headedness, loss of balance, seizures and weakness.   Psychiatric/Behavioral: Negative for altered mental status and memory loss. The patient is not nervous/anxious.          Objective:     Physical Exam   Constitutional: He is oriented to person, place, and time. He appears well-developed and well-nourished.   HENT:   Head: Normocephalic.   Eyes: Pupils are equal, round, and reactive to light.   Neck: Normal range of motion. No tracheal deviation present.   Cardiovascular: Normal rate, regular rhythm, normal heart sounds and intact distal pulses. Exam reveals no gallop and no friction rub.   No murmur heard.  Pulses:       Carotid pulses are 2+ on the right side and 2+ on the left side.       Radial pulses are 2+ on the right side and 2+ on the left side.        Femoral pulses are 2+ on the right side and 2+ on the left side.       Popliteal pulses are 2+ on the right side and 2+ on the left side.        Dorsalis pedis pulses are 2+ on the right side and 2+ on the left side.        Posterior tibial pulses are 2+ on the right side and 2+ on  the left side.   Pulmonary/Chest: Effort normal and breath sounds normal. No stridor. No respiratory distress. He has no wheezes. He has no rales. He exhibits no tenderness.   Abdominal: He exhibits no distension. There is no abdominal tenderness. There is no rebound.   Musculoskeletal:         General: No tenderness or edema.   Neurological: He is alert and oriented to person, place, and time.   Skin: Skin is warm and dry.     EKG NSR RBBB and possible septal MI       Assessment:     1. Abnormal ECG    2. Essential hypertension    3. Palpitation    4. GAURANG (obstructive sleep apnea)          Plan:     1.  NEW RBBB and possible septal MI. Prior early CAD with father and family history.    2.  Palpitations : plan holter monitor determine if there is any significant arrhythmias.  3.  EKG of abnormality consistent with possible septal infarct,    will plan on a stress echo.  4.  Sleep apnea:  Will plan continued sleep apnea monitoring.  I will see the patient back after these tests are performed.  For further  Symptoms or worsening symptoms he will call the office.

## 2020-10-17 NOTE — ASSESSMENT & PLAN NOTE
Continue monitoring.  Patient will notify me if becoming enlarged or painful.  Referral to orthopedics at that time.

## 2020-10-17 NOTE — ASSESSMENT & PLAN NOTE
Patient with concerning abnormal chest pain/sensation.  Abnormal EKG.  Same day appointment with Cardiology was set.  ER precautions.  Patient is taking aspirin daily.

## 2020-10-19 NOTE — PROGRESS NOTES
Abnormal EKG.  Patient should follow up with Cardiology.  Please make sure that they have an appointment.

## 2020-10-20 NOTE — PROGRESS NOTES
#CALL THE PATIENT# to discuss results/see if they have questions and document verification. Make FU appt if needed.    #Pend me the orders in my interpretation below.#    I have sent a message to them with the interpretation (see below).  See if they have any questions and make a follow-up appointment if not already schedule and if needed.    Also please address any outstanding health maintenance that may be due: TETANUS VACCINE due on 03/29/1970  Shingles Vaccine(1 of 2) due on 03/29/2002  Pneumococcal Vaccine (65+ Low/Medium Risk)(1 of 2 - PCV13) due on 03/29/2017  ====================================    My interpretation that was sent to them through [facility]:    Naveed, I have reviewed your recent blood work.     TESTOSTERONE LABS ARE STILL PENDING.  Your complete blood count is NORMAL.  NO ANEMIA.  Your metabolic panel which shows your glucose, kidney function, electrolytes, and liver function is WITHIN NORMAL LIMITS.   Thyroid studies are NORMAL.   Your cholesterol is STABLE, WELL CONTROLLED WITH LOW RATIO.  CONTINUE CURRENT MEDICATION REGIMEN AND SUPPLEMENTS.  Your vitamin-D is NORMAL .  B12 LEVEL IS WITHIN NORMAL LIMITS.  PSA SCREEN IS WITHIN NORMAL LIMITS.  Your hemoglobin A1c is 4.9 WHICH IS NORMAL.  This test is gold standard screening test for diabetes.  It is a measures 3 months of your average blood sugar.

## 2020-10-22 LAB
ALBUMIN SERPL-MCNC: 4.5 G/DL (ref 3.6–5.1)
SHBG SERPL-SCNC: 63 NMOL/L (ref 22–77)
TESTOST FREE SERPL-MCNC: 91.9 PG/ML (ref 46–224)
TESTOST SERPL-MCNC: 1067 NG/DL (ref 250–1100)
TESTOSTERONE.FREE+WB SERPL-MCNC: 189.1 NG/DL (ref 110–575)

## 2020-10-23 LAB — DHEA SERPL-MCNC: 2.67 NG/ML (ref 0.63–4.7)

## 2020-10-23 NOTE — PROGRESS NOTES
Please CALL patient with results and Document verification.   [phone]  Testosterone is within normal limits on current supplementation.

## 2020-11-04 ENCOUNTER — HOSPITAL ENCOUNTER (OUTPATIENT)
Dept: CARDIOLOGY | Facility: HOSPITAL | Age: 70
Discharge: HOME | End: 2020-11-04
Attending: INTERNAL MEDICINE
Payer: MEDICARE

## 2020-11-04 VITALS — TEMPERATURE: 97 F

## 2020-11-04 DIAGNOSIS — R00.2 PALPITATION: ICD-10-CM

## 2020-11-04 DIAGNOSIS — G47.33 OSA (OBSTRUCTIVE SLEEP APNEA): ICD-10-CM

## 2020-11-04 DIAGNOSIS — R94.31 ABNORMAL ECG: ICD-10-CM

## 2020-11-04 DIAGNOSIS — I45.10 RBBB: ICD-10-CM

## 2020-11-04 DIAGNOSIS — F41.9 ANXIETY: ICD-10-CM

## 2020-11-04 DIAGNOSIS — I10 ESSENTIAL HYPERTENSION: ICD-10-CM

## 2020-11-04 PROCEDURE — 93227 HOLTER MONITOR - 48 HOUR (CUPID ONLY): ICD-10-PCS | Mod: HCNC,,, | Performed by: INTERNAL MEDICINE

## 2020-11-04 PROCEDURE — 93227 XTRNL ECG REC<48 HR R&I: CPT | Mod: HCNC,,, | Performed by: INTERNAL MEDICINE

## 2020-11-04 PROCEDURE — 93226 XTRNL ECG REC<48 HR SCAN A/R: CPT | Mod: HCNC,PO

## 2020-11-07 LAB
OHS CV EVENT MONITOR DAY: 0
OHS CV HOLTER LENGTH DECIMAL HOURS: 48
OHS CV HOLTER LENGTH HOURS: 48
OHS CV HOLTER LENGTH MINUTES: 0

## 2020-11-09 ENCOUNTER — TELEPHONE (OUTPATIENT)
Dept: CARDIOLOGY | Facility: CLINIC | Age: 70
End: 2020-11-09

## 2020-11-09 NOTE — TELEPHONE ENCOUNTER
Contacted pt about test results, pt vu      ----- Message from Bj Lock MD sent at 11/9/2020  9:13 AM CST -----  Tell patient predominantly normal monitor and I will discuss the results at the next visit

## 2020-11-09 NOTE — TELEPHONE ENCOUNTER
Notified pt of test results, pt vu    ----- Message from Bj Lock MD sent at 11/9/2020  9:13 AM CST -----  Tell patient predominantly normal monitor and I will discuss the results at the next visit

## 2020-11-16 ENCOUNTER — TELEPHONE (OUTPATIENT)
Dept: CARDIOLOGY | Facility: CLINIC | Age: 70
End: 2020-11-16

## 2020-11-16 ENCOUNTER — HOSPITAL ENCOUNTER (OUTPATIENT)
Dept: CARDIOLOGY | Facility: HOSPITAL | Age: 70
Discharge: HOME | End: 2020-11-16
Attending: INTERNAL MEDICINE
Payer: MEDICARE

## 2020-11-16 VITALS
WEIGHT: 220 LBS | BODY MASS INDEX: 30.8 KG/M2 | DIASTOLIC BLOOD PRESSURE: 96 MMHG | HEART RATE: 107 BPM | HEIGHT: 71 IN | SYSTOLIC BLOOD PRESSURE: 161 MMHG

## 2020-11-16 DIAGNOSIS — I10 ESSENTIAL HYPERTENSION: ICD-10-CM

## 2020-11-16 DIAGNOSIS — R94.31 ABNORMAL ECG: ICD-10-CM

## 2020-11-16 DIAGNOSIS — F41.9 ANXIETY: ICD-10-CM

## 2020-11-16 DIAGNOSIS — I45.10 RBBB: ICD-10-CM

## 2020-11-16 DIAGNOSIS — R00.2 PALPITATION: ICD-10-CM

## 2020-11-16 DIAGNOSIS — G47.33 OSA (OBSTRUCTIVE SLEEP APNEA): ICD-10-CM

## 2020-11-16 LAB
AV INDEX (PROSTH): 0.77
AV LVOT PEAK GRADIENT: 7 MMHG
AV MEAN GRADIENT: 6 MMHG
AV PEAK GRADIENT: 10 MMHG
AV VALVE AREA: 3.07 CM2
AV VELOCITY RATIO: 0.82
BSA FOR ECHO PROCEDURE: 2.24 M2
CV ECHO LV RWT: 0.59 CM
CV STRESS BASE HR: 107 BPM
DIASTOLIC BLOOD PRESSURE: 96 MMHG
DOP CALC AO PEAK VEL: 1.59 M/S
DOP CALC AO VTI: 26.58 CM
DOP CALC LVOT AREA: 4 CM2
DOP CALC LVOT DIAMETER: 2.26 CM
DOP CALC LVOT PEAK VEL: 1.3 M/S
DOP CALC LVOT STROKE VOLUME: 81.63 CM3
DOP CALC RVOT PEAK VEL: 1.06 M/S
DOP CALC RVOT VTI: 20.54 CM
DOP CALCLVOT PEAK VEL VTI: 20.36 CM
E WAVE DECELERATION TIME: 98.37 MS
E/A RATIO: 0.51
E/E' RATIO: 5 M/S
ECHO EF ESTIMATED: 76 %
ECHO LV POSTERIOR WALL: 1.45 CM (ref 0.6–1.1)
FRACTIONAL SHORTENING: 45 % (ref 28–44)
INTERVENTRICULAR SEPTUM: 1.53 CM (ref 0.6–1.1)
IVRT: 117.65 MS
LA MAJOR: 5.9 CM
LA MINOR: 5.9 CM
LA WIDTH: 3 CM
LEFT ATRIUM SIZE: 3.73 CM
LEFT ATRIUM VOLUME INDEX: 25.6 ML/M2
LEFT ATRIUM VOLUME: 56.12 CM3
LEFT INTERNAL DIMENSION IN SYSTOLE: 2.71 CM (ref 2.1–4)
LEFT VENTRICLE DIASTOLIC VOLUME INDEX: 51.91 ML/M2
LEFT VENTRICLE DIASTOLIC VOLUME: 114 ML
LEFT VENTRICLE MASS INDEX: 142 G/M2
LEFT VENTRICLE SYSTOLIC VOLUME INDEX: 12.3 ML/M2
LEFT VENTRICLE SYSTOLIC VOLUME: 27 ML
LEFT VENTRICULAR INTERNAL DIMENSION IN DIASTOLE: 4.93 CM (ref 3.5–6)
LEFT VENTRICULAR MASS: 312.68 G
LV LATERAL E/E' RATIO: 5 M/S
LV SEPTAL E/E' RATIO: 5 M/S
MV PEAK A VEL: 0.98 M/S
MV PEAK E VEL: 0.5 M/S
MV STENOSIS PRESSURE HALF TIME: 29 MS
MV VALVE AREA P 1/2 METHOD: 7.59 CM2
OHS CV CPX 1 MINUTE RECOVERY HEART RATE: 110 BPM
OHS CV CPX 85 PERCENT MAX PREDICTED HEART RATE MALE: 129
OHS CV CPX ESTIMATED METS: 7
OHS CV CPX MAX PREDICTED HEART RATE: 152
OHS CV CPX PATIENT IS FEMALE: 0
OHS CV CPX PATIENT IS MALE: 1
OHS CV CPX PEAK DIASTOLIC BLOOD PRESSURE: 85 MMHG
OHS CV CPX PEAK HEAR RATE: 139 BPM
OHS CV CPX PEAK RATE PRESSURE PRODUCT: ABNORMAL
OHS CV CPX PEAK SYSTOLIC BLOOD PRESSURE: 214 MMHG
OHS CV CPX PERCENT MAX PREDICTED HEART RATE ACHIEVED: 91
OHS CV CPX RATE PRESSURE PRODUCT PRESENTING: ABNORMAL
PISA TR MAX VEL: 2.19 M/S
PROX AORTA: 3.54 CM
PULM VEIN A" WAVE DURATION": 141.87 MS
PULM VEIN S/D RATIO: 1.64
PV MEAN GRADIENT: 3 MMHG
PV PEAK D VEL: 0.42 M/S
PV PEAK S VEL: 0.69 M/S
PV PEAK VELOCITY: 1.04 M/S
RA MAJOR: 3.4 CM
RA PRESSURE: 3 MMHG
RA WIDTH: 2.8 CM
RIGHT VENTRICULAR END-DIASTOLIC DIMENSION: 1.66 CM
SINUS: 3.1 CM
STJ: 3 CM
STRESS ANGINA INDEX: 0
STRESS ECHO POST EXERCISE DUR MIN: 5 MINUTES
STRESS ECHO POST EXERCISE DUR SEC: 42 SECONDS
SYSTOLIC BLOOD PRESSURE: 161 MMHG
TDI LATERAL: 0.1 M/S
TDI SEPTAL: 0.1 M/S
TDI: 0.1 M/S
TR MAX PG: 19 MMHG
TRICUSPID ANNULAR PLANE SYSTOLIC EXCURSION: 2.3 CM
TV REST PULMONARY ARTERY PRESSURE: 22 MMHG

## 2020-11-16 PROCEDURE — 93351 STRESS ECHO (CUPID ONLY): ICD-10-PCS | Mod: 26,HCNC,, | Performed by: INTERNAL MEDICINE

## 2020-11-16 PROCEDURE — 93325 DOPPLER ECHO COLOR FLOW MAPG: CPT | Mod: 26,HCNC,, | Performed by: INTERNAL MEDICINE

## 2020-11-16 PROCEDURE — 93351 STRESS TTE COMPLETE: CPT | Mod: 26,HCNC,, | Performed by: INTERNAL MEDICINE

## 2020-11-16 PROCEDURE — 93320 DOPPLER ECHO COMPLETE: CPT | Mod: 26,HCNC,, | Performed by: INTERNAL MEDICINE

## 2020-11-16 PROCEDURE — 93320 STRESS ECHO (CUPID ONLY): ICD-10-PCS | Mod: 26,HCNC,, | Performed by: INTERNAL MEDICINE

## 2020-11-16 PROCEDURE — 93325 STRESS ECHO (CUPID ONLY): ICD-10-PCS | Mod: 26,HCNC,, | Performed by: INTERNAL MEDICINE

## 2020-11-16 PROCEDURE — 93351 STRESS TTE COMPLETE: CPT | Mod: HCNC,PO

## 2020-11-16 NOTE — TELEPHONE ENCOUNTER
Talked to pt about echo test results and upcoming appointment, pt vu      ----- Message from Jackelin Machado sent at 11/16/2020  4:29 PM CST -----  .Type:  Patient Returning Call    Who Called:Andrés Fernando  Who Left Message for Patient:April  Does the patient know what this is regarding?:no  Would the patient rather a call back or a response via [facility]? Call back  Best Call Back Number:[phone]  Additional Information:

## 2020-11-16 NOTE — TELEPHONE ENCOUNTER
Attempted to contact pt about echo test results, left vm    ----- Message from Bj Lock MD sent at 11/16/2020  3:49 PM CST -----  This is a normal study.  Tell the patient this was a normal study and will discuss at next visit

## 2020-11-18 NOTE — PROGRESS NOTES
Subjective:   Patient ID:  Andrés Casillas is a 70 y.o. male who presents for follow-up of No chief complaint on file.   The patient has had no change in symptoms no specific chest discomfort.  Mild palpitations have been noted.  Patient denies chest pain, angina or symptoms associated with anginal equivalent.  Elevated blood pressure today.  Patient here for evaluation of both stress echo and Holter monitor.  Recheck of blood pressure today which was elevated last visit.      Review of Systems   Constitution: Negative for chills, diaphoresis, night sweats, weight gain and weight loss.   HENT: Negative for congestion, hoarse voice, sore throat and stridor.    Eyes: Negative for double vision and pain.   Cardiovascular: Negative for chest pain, claudication, cyanosis, dyspnea on exertion, irregular heartbeat, leg swelling, near-syncope, orthopnea, palpitations, paroxysmal nocturnal dyspnea and syncope.   Respiratory: Negative for cough, hemoptysis, shortness of breath, sleep disturbances due to breathing, snoring, sputum production and wheezing.    Endocrine: Negative for cold intolerance, heat intolerance and polydipsia.   Hematologic/Lymphatic: Negative for bleeding problem. Does not bruise/bleed easily.   Skin: Negative for color change, dry skin and rash.   Musculoskeletal: Negative for joint swelling and muscle cramps.   Gastrointestinal: Negative for bloating, abdominal pain, constipation, diarrhea, dysphagia, melena, nausea and vomiting.   Genitourinary: Negative for flank pain and urgency.   Neurological: Negative for dizziness, focal weakness, headaches, light-headedness, loss of balance, seizures and weakness.   Psychiatric/Behavioral: Negative for altered mental status and memory loss. The patient is not nervous/anxious.      Family History   Problem Relation Age of Onset    Heart disease Mother     Cancer Mother     Heart disease Father     Prostate cancer Brother      Past Medical History:    Diagnosis Date    Anxiety     BPH (benign prostatic hypertrophy)     DDD (degenerative disc disease), lumbar     Hypertension     GAURANG (obstructive sleep apnea)      Social History     Socioeconomic History    Marital status: Single     Spouse name: Not on file    Number of children: Not on file    Years of education: Not on file    Highest education level: Not on file   Occupational History     Employer: [employer]   Social Needs    Financial resource strain: Not very hard    Food insecurity     Worry: Never true     Inability: Never true    Transportation needs     Medical: No     Non-medical: No   Tobacco Use    Smoking status: Never Smoker    Smokeless tobacco: Never Used   Substance and Sexual Activity    Alcohol use: Yes     Alcohol/week: 1.7 standard drinks     Types: 2 Standard drinks or equivalent per week     Comment: socially    Drug use: No    Sexual activity: Yes     Partners: Female   Lifestyle    Physical activity     Days per week: 5 days     Minutes per session: 30 min    Stress: To some extent   Relationships    Social connections     Talks on phone: More than three times a week     Gets together: More than three times a week     Attends Denominational service: More than 4 times per year     Active member of club or organization: No     Attends meetings of clubs or organizations: Never     Relationship status:    Other Topics Concern    Not on file   Social History Narrative    ** Merged History Encounter **          Current Outpatient Medications on File Prior to Visit   Medication Sig Dispense Refill    ALPRAZolam (XANAX) 1 MG tablet Take 1 tablet (1 mg total) by mouth 3 (three) times daily. 90 tablet 5    aspirin 325 MG tablet Take 325 mg by mouth.      co-enzyme Q-10 30 mg capsule Take 30 mg by mouth once daily.       fish oil-omega-3 fatty acids 300-1,000 mg capsule Take 1 capsule by mouth once daily.       furosemide (LASIX) 40 MG tablet TAKE 1 TABLET BY MOUTH  DAILY AS NEEDED FOR FLUID RETENSION 90 tablet 4    gabapentin (NEURONTIN) 300 MG capsule Take 300 mg by mouth.      lisinopril (PRINIVIL,ZESTRIL) 20 MG tablet Take 1 tablet (20 mg total) by mouth once daily. 30 tablet 11    magnesium oxide (MAG-OX) 400 mg (241.3 mg magnesium) tablet Take 400 mg by mouth once daily.       tamsulosin (FLOMAX) 0.4 mg Cp24 0.4 mg.       testosterone cypionate (DEPOTESTOTERONE CYPIONATE) 100 mg/mL injection Inject into the muscle.      VENTOLIN HFA 90 mcg/actuation inhaler INHALE 2 PUFFS BY MOUTH EVERY 6 HOURS AS NEEDED FOR WHEEZE. RESCUE 18 Inhaler 12    zinc gluconate 50 mg tablet Take 50 mg by mouth.       No current facility-administered medications on file prior to visit.      Review of patient's allergies indicates:   Allergen Reactions    No known drug allergies        Objective:     Physical Exam   Constitutional: He is oriented to person, place, and time.   Eyes: Pupils are equal, round, and reactive to light.   Neck: Normal range of motion. No tracheal deviation present.   Cardiovascular: Normal rate, regular rhythm, normal heart sounds and intact distal pulses. Exam reveals no gallop and no friction rub.   No murmur heard.  Pulses:       Carotid pulses are 2+ on the right side and 2+ on the left side.       Radial pulses are 2+ on the right side and 2+ on the left side.        Femoral pulses are 2+ on the right side and 2+ on the left side.       Popliteal pulses are 2+ on the right side and 2+ on the left side.        Dorsalis pedis pulses are 2+ on the right side and 2+ on the left side.        Posterior tibial pulses are 2+ on the right side and 2+ on the left side.   Pulmonary/Chest: Effort normal and breath sounds normal. No stridor. No respiratory distress. He has no wheezes. He has no rales. He exhibits no tenderness.   Abdominal: He exhibits no distension. There is no abdominal tenderness. There is no rebound.   Musculoskeletal:         General: No tenderness  or edema.   Neurological: He is alert and oriented to person, place, and time.   Skin: Skin is warm and dry.     Cholesterol 120 - 199 mg/dL 171  176 CM  150 CM    Comment: The National Cholesterol Education Program (NCEP) has set the   following guidelines (reference ranges) for Cholesterol:   Optimal.....................<200 mg/dL   Borderline High.............200-239 mg/dL   High........................> or = 240 mg/dL    Triglycerides 30 - 150 mg/dL 78  54 CM  50 CM    Comment: The National Cholesterol Education Program (NCEP) has set the   following guidelines (reference values) for triglycerides:   Normal......................<150 mg/dL   Borderline High.............150-199 mg/dL   High........................200-499 mg/dL    HDL 40 - 75 mg/dL 50  57 CM  50 CM    Comment: The National Cholesterol Education Program (NCEP) has set the   following guidelines (reference values) for HDL Cholesterol:   Low...............<40 mg/dL   Optimal...........>60 mg/dL    LDL Cholesterol 63.0 - 159.0 mg/dL 105.4  108.2 CM  90.0 CM    Comment: The National Cholesterol Education Program (NCEP) has set the   following guidelines (reference values) for LDL Cholesterol:   Optimal.......................<130 mg/dL   Borderline High...............130-159 mg/dL        Conclusion stress echo on 11 /16/20    · ECG: The baseline electrocardiogram reveals normal sinus rhythm with complete RBBB at a rate of 107 bpm. There was left axis deviation.  · The ECG portion of this study is negative for myocardial ischemia.  · The patient's exercise capacity was mildly impaired.  · There were no arrhythmias during stress.  · The left ventricle is normal in size with normal systolic function. The estimated ejection fraction is 60%.  · There is left ventricular concentric hypertrophy.  · Normal left ventricular diastolic function.  · Aortic valve sclerosis is mild. There is aortic valve sclerosis without significant stenosis. There  · Normal right  ventricular size with normal right ventricular systolic function.  · Normal central venous pressure (3 mmHg).  · The estimated PA systolic pressure is 22 mmHg.  · The stress echo portion of this study is negative for myocardial ischemia.      Holter monitor report 11/04/2020:  Baseline rhythm is normal sinus rhythm.     Monitoring started at 8:29 AM and continued for 47 hr 59 min. The average heart rate was 74 BPM. The minimum heart  rate was 52 BPM, occurring at 4:16:22 AM D1. The maximum heart rate was 122 BPM, occurring at 1:40:38 PM D2.     Ventricular ectopic activity consisted of 50 beats, of which, 30 were in single PVCs, 20 were single VEs.  The patient's rhythm included 3 hr 46 min 23 sec of bradycardia. The slowest single episode of bradycardia occurred at  5:47:50 AM D2, lasting 5 min 4 sec, with minimum heart rate of 52 BPM.     Supraventricular ectopic activity consisted of 48 beats, of which, 2 were in atrial couplets, 3 were late beats, 43 were  single PACs. The longest R-R interval was 1.4 seconds occurring at 4:43:25 AM D1.      The patient's rhythm included 1 hr 59 min 54 sec of tachycardia. The fastest single episode of tachycardia occurred at  1:14:59 PM D2, lasting 27 min 50 sec, with maximum heart rate of 122 BPM.    Assessment:     1. Abnormal ECG    2. Essential hypertension    3. Palpitation    4. RBBB    5. GAURANG (obstructive sleep apnea)    6. Anxiety        Plan:     Abnormal ECG    Essential hypertension    Palpitation    RBBB    GAURANG (obstructive sleep apnea)    Anxiety      Review of testing today shows normal stress echo nodes cardiac ischemia.  Mild sclerosis of the aortic valve without stenosis noted.  Monitor shows evidence of mild tachycardia.  No malignant arrhythmias, no pauses detected.  Blood pressure is elevated today all in increase his medicines with amlodipine 2.5 mg.  Will continue with lisinopril 20 mg daily.  Lasix is given intermittently.  No swelling today.  I will  follow him up with a blood pressure check again in 1 month.  I will see him sooner if there are any acute or of standing symptoms.

## 2020-11-20 ENCOUNTER — OFFICE VISIT (OUTPATIENT)
Dept: CARDIOLOGY | Facility: CLINIC | Age: 70
End: 2020-11-20
Payer: MEDICARE

## 2020-11-20 VITALS
BODY MASS INDEX: 30.85 KG/M2 | WEIGHT: 221.19 LBS | SYSTOLIC BLOOD PRESSURE: 160 MMHG | HEART RATE: 93 BPM | OXYGEN SATURATION: 94 % | DIASTOLIC BLOOD PRESSURE: 100 MMHG

## 2020-11-20 DIAGNOSIS — R00.2 PALPITATION: ICD-10-CM

## 2020-11-20 DIAGNOSIS — G47.33 OSA (OBSTRUCTIVE SLEEP APNEA): ICD-10-CM

## 2020-11-20 DIAGNOSIS — F41.9 ANXIETY: ICD-10-CM

## 2020-11-20 DIAGNOSIS — I10 ESSENTIAL HYPERTENSION: Primary | ICD-10-CM

## 2020-11-20 DIAGNOSIS — I45.10 RBBB: ICD-10-CM

## 2020-11-20 DIAGNOSIS — R94.31 ABNORMAL ECG: ICD-10-CM

## 2020-11-20 PROCEDURE — 1159F MED LIST DOCD IN RCRD: CPT | Mod: HCNC,S$GLB,, | Performed by: INTERNAL MEDICINE

## 2020-11-20 PROCEDURE — 3077F PR MOST RECENT SYSTOLIC BLOOD PRESSURE >= 140 MM HG: ICD-10-PCS | Mod: HCNC,CPTII,S$GLB, | Performed by: INTERNAL MEDICINE

## 2020-11-20 PROCEDURE — 99214 PR OFFICE/OUTPT VISIT, EST, LEVL IV, 30-39 MIN: ICD-10-PCS | Mod: HCNC,S$GLB,, | Performed by: INTERNAL MEDICINE

## 2020-11-20 PROCEDURE — 3008F PR BODY MASS INDEX (BMI) DOCUMENTED: ICD-10-PCS | Mod: HCNC,CPTII,S$GLB, | Performed by: INTERNAL MEDICINE

## 2020-11-20 PROCEDURE — 99999 PR PBB SHADOW E&M-EST. PATIENT-LVL IV: ICD-10-PCS | Mod: PBBFAC,HCNC,, | Performed by: INTERNAL MEDICINE

## 2020-11-20 PROCEDURE — 99999 PR PBB SHADOW E&M-EST. PATIENT-LVL IV: CPT | Mod: PBBFAC,HCNC,, | Performed by: INTERNAL MEDICINE

## 2020-11-20 PROCEDURE — 3077F SYST BP >= 140 MM HG: CPT | Mod: HCNC,CPTII,S$GLB, | Performed by: INTERNAL MEDICINE

## 2020-11-20 PROCEDURE — 1159F PR MEDICATION LIST DOCUMENTED IN MEDICAL RECORD: ICD-10-PCS | Mod: HCNC,S$GLB,, | Performed by: INTERNAL MEDICINE

## 2020-11-20 PROCEDURE — 3080F DIAST BP >= 90 MM HG: CPT | Mod: HCNC,CPTII,S$GLB, | Performed by: INTERNAL MEDICINE

## 2020-11-20 PROCEDURE — 3080F PR MOST RECENT DIASTOLIC BLOOD PRESSURE >= 90 MM HG: ICD-10-PCS | Mod: HCNC,CPTII,S$GLB, | Performed by: INTERNAL MEDICINE

## 2020-11-20 PROCEDURE — 99214 OFFICE O/P EST MOD 30 MIN: CPT | Mod: HCNC,S$GLB,, | Performed by: INTERNAL MEDICINE

## 2020-11-20 PROCEDURE — 3008F BODY MASS INDEX DOCD: CPT | Mod: HCNC,CPTII,S$GLB, | Performed by: INTERNAL MEDICINE

## 2020-11-20 RX ORDER — AMLODIPINE BESYLATE 2.5 MG/1
2.5 TABLET ORAL DAILY
Qty: 30 TABLET | Refills: 11 | Status: SHIPPED | OUTPATIENT
Start: 2020-11-20 | End: 2020-12-11 | Stop reason: SDUPTHER

## 2020-11-20 NOTE — PATIENT INSTRUCTIONS
Controlling High Blood Pressure  High blood pressure (hypertension) is often called the silent killer. This is because many people who have it dont know it. High blood pressure is defined as 140/90 mm Hg or higher. Know your blood pressure and remember to check it regularly. Doing so can save your life. Here are some things you can do to help control your blood pressure.    Choose heart-healthy foods  · Select low-salt, low-fat foods. Limit sodium intake to 2,400 mg per day or the amount suggested by your healthcare provider.  · Limit canned, dried, cured, packaged, and fast foods. These can contain a lot of salt.  · Eat 8 to 10 servings of fruits and vegetables every day.  · Choose lean meats, fish, or chicken.  · Eat whole-grain pasta, brown rice, and beans.  · Eat 2 to 3 servings of low-fat or fat-free dairy products.  · Ask your doctor about the DASH eating plan. This plan helps reduce blood pressure.  · When you go to a restaurant, ask that your meal be prepared with no added salt.  Maintain a healthy weight  · Ask your healthcare provider how many calories to eat a day. Then stick to that number.  · Ask your healthcare provider what weight range is healthiest for you. If you are overweight, a weight loss of only 3% to 5% of your body weight can help lower blood pressure. Generally, a good weight loss goal is to lose 10% of your body weight in a year.  · Limit snacks and sweets.  · Get regular exercise.  Get up and get active  · Choose activities you enjoy. Find ones you can do with friends or family. This includes bicycling, dancing, walking, and jogging.  · Park farther away from building entrances.  · Use stairs instead of the elevator.  · When you can, walk or bike instead of driving.  · [place] leaves, garden, or do household repairs.  · Be active at a moderate to vigorous level of physical activity for at least 40 minutes for a minimum of 3 to 4 days a week.   Manage stress  · Make time to relax and enjoy  life. Find time to laugh.  · Communicate your concerns with your loved ones and your healthcare provider.  · Visit with family and friends, and keep up with hobbies.  Limit alcohol and quit smoking  · Men should have no more than 2 drinks per day.  · Women should have no more than 1 drink per day.  · Talk with your healthcare provider about quitting smoking. Smoking significantly increases your risk for heart disease and stroke. Ask your healthcare provider about community smoking cessation programs and other options.  Medicines  If lifestyle changes arent enough, your healthcare provider may prescribe high blood pressure medicine. Take all medicines as prescribed. If you have any questions about your medicines, ask your healthcare provider before stopping or changing them.   Date Last Reviewed: 4/27/2016  © 2805-9451 [employer]. [address] [place] [address], [place] [address]. All rights reserved. This information is not intended as a substitute for professional medical care. Always follow your healthcare professional's instructions.

## 2020-11-23 ENCOUNTER — PATIENT OUTREACH (OUTPATIENT)
Dept: ADMINISTRATIVE | Facility: HOSPITAL | Age: 70
End: 2020-11-23

## 2020-11-23 NOTE — PROGRESS NOTES
HTN Report. Attempting to contact pt to obtain a home BP reading. Unable to reach patient at this time. No answer. Patient does have F/U appointment scheduled with Cardiology department on 12/18/2020.

## 2020-12-07 ENCOUNTER — NURSE TRIAGE (OUTPATIENT)
Dept: ADMINISTRATIVE | Facility: CLINIC | Age: 70
End: 2020-12-07

## 2020-12-07 ENCOUNTER — TELEPHONE (OUTPATIENT)
Dept: ADMINISTRATIVE | Facility: CLINIC | Age: 70
End: 2020-12-07

## 2020-12-07 ENCOUNTER — PATIENT MESSAGE (OUTPATIENT)
Dept: CARDIOLOGY | Facility: CLINIC | Age: 70
End: 2020-12-07

## 2020-12-07 NOTE — TELEPHONE ENCOUNTER
[facility] RN Patient calling in reference to b/p.  This morning 1000   142/116 Added another pill linsinopril. Last night.  Patient care advice is to make appointment to see Cardiologist who manages his b/p meds.  Got diconnect and will have doctor/RN from Cardiologist call his back. I called the doctors office number.  To Have doctor call back pat.  Care advice is to see doctor in 2 weeks.  Patient would like to talk to doctor now about meds.  I called schedule who was Michelle and who will call client to make appt.      Reason for Disposition   Systolic BP >= 130 OR Diastolic >= 80, and is taking BP medications    Additional Information   Negative: Sounds like a life-threatening emergency to the triager   Negative: Pregnant > 20 weeks or postpartum (< 6 weeks after delivery) and new hand or face swelling   Negative: Pregnant > 20 weeks and BP > 140/90   Negative: Systolic BP >= 160 OR Diastolic >= 100, and any cardiac or neurologic symptoms (e.g., chest pain, difficulty breathing, unsteady gait, blurred vision)   Negative: Patient sounds very sick or weak to the triager   Negative: BP Systolic BP >= 140 OR Diastolic >= 90 and postpartum (from 0 to 6 weeks after delivery)   Negative: Systolic BP >= 180 OR Diastolic >= 110, and missed most recent dose of blood pressure medication   Negative: Systolic BP >= 180 OR Diastolic >= 110   Negative: Patient wants to be seen   Negative: Ran out of BP medications   Negative: Taking BP medications and feels is having side effects (e.g., impotence, cough, dizziness)   Negative: Systolic BP >= 160 OR Diastolic >= 100   Negative: Systolic BP >= 130 OR Diastolic >= 80, and pregnant    Protocols used: HIGH BLOOD PRESSURE-A-OH

## 2020-12-07 NOTE — TELEPHONE ENCOUNTER
Called by another [facility] triage nurse.     Reason for Disposition   Caller has already spoken with another triager and has no further questions    Protocols used: NO CONTACT OR DUPLICATE CONTACT CALL-A-OH

## 2020-12-11 ENCOUNTER — OFFICE VISIT (OUTPATIENT)
Dept: CARDIOLOGY | Facility: CLINIC | Age: 70
End: 2020-12-11
Payer: MEDICARE

## 2020-12-11 VITALS
BODY MASS INDEX: 30.27 KG/M2 | HEART RATE: 80 BPM | SYSTOLIC BLOOD PRESSURE: 120 MMHG | OXYGEN SATURATION: 96 % | DIASTOLIC BLOOD PRESSURE: 70 MMHG | WEIGHT: 217 LBS

## 2020-12-11 DIAGNOSIS — R00.2 PALPITATION: ICD-10-CM

## 2020-12-11 DIAGNOSIS — I45.10 RBBB: ICD-10-CM

## 2020-12-11 DIAGNOSIS — G47.33 OSA (OBSTRUCTIVE SLEEP APNEA): ICD-10-CM

## 2020-12-11 DIAGNOSIS — F41.9 ANXIETY: ICD-10-CM

## 2020-12-11 DIAGNOSIS — R94.31 ABNORMAL ECG: ICD-10-CM

## 2020-12-11 DIAGNOSIS — I10 ESSENTIAL HYPERTENSION: Primary | ICD-10-CM

## 2020-12-11 DIAGNOSIS — R00.0 TACHYCARDIA: ICD-10-CM

## 2020-12-11 PROCEDURE — 99214 OFFICE O/P EST MOD 30 MIN: CPT | Mod: HCNC,S$GLB,, | Performed by: INTERNAL MEDICINE

## 2020-12-11 PROCEDURE — 3008F PR BODY MASS INDEX (BMI) DOCUMENTED: ICD-10-PCS | Mod: HCNC,CPTII,S$GLB, | Performed by: INTERNAL MEDICINE

## 2020-12-11 PROCEDURE — 99999 PR PBB SHADOW E&M-EST. PATIENT-LVL III: ICD-10-PCS | Mod: PBBFAC,HCNC,, | Performed by: INTERNAL MEDICINE

## 2020-12-11 PROCEDURE — 1159F PR MEDICATION LIST DOCUMENTED IN MEDICAL RECORD: ICD-10-PCS | Mod: HCNC,S$GLB,, | Performed by: INTERNAL MEDICINE

## 2020-12-11 PROCEDURE — 99999 PR PBB SHADOW E&M-EST. PATIENT-LVL III: CPT | Mod: PBBFAC,HCNC,, | Performed by: INTERNAL MEDICINE

## 2020-12-11 PROCEDURE — 3074F SYST BP LT 130 MM HG: CPT | Mod: HCNC,CPTII,S$GLB, | Performed by: INTERNAL MEDICINE

## 2020-12-11 PROCEDURE — 3074F PR MOST RECENT SYSTOLIC BLOOD PRESSURE < 130 MM HG: ICD-10-PCS | Mod: HCNC,CPTII,S$GLB, | Performed by: INTERNAL MEDICINE

## 2020-12-11 PROCEDURE — 99214 PR OFFICE/OUTPT VISIT, EST, LEVL IV, 30-39 MIN: ICD-10-PCS | Mod: HCNC,S$GLB,, | Performed by: INTERNAL MEDICINE

## 2020-12-11 PROCEDURE — 3078F DIAST BP <80 MM HG: CPT | Mod: HCNC,CPTII,S$GLB, | Performed by: INTERNAL MEDICINE

## 2020-12-11 PROCEDURE — 3008F BODY MASS INDEX DOCD: CPT | Mod: HCNC,CPTII,S$GLB, | Performed by: INTERNAL MEDICINE

## 2020-12-11 PROCEDURE — 1159F MED LIST DOCD IN RCRD: CPT | Mod: HCNC,S$GLB,, | Performed by: INTERNAL MEDICINE

## 2020-12-11 PROCEDURE — 3078F PR MOST RECENT DIASTOLIC BLOOD PRESSURE < 80 MM HG: ICD-10-PCS | Mod: HCNC,CPTII,S$GLB, | Performed by: INTERNAL MEDICINE

## 2020-12-11 RX ORDER — LISINOPRIL 40 MG/1
40 TABLET ORAL DAILY
Qty: 90 TABLET | Refills: 3 | Status: SHIPPED | OUTPATIENT
Start: 2020-12-11 | End: 2021-08-20

## 2020-12-11 RX ORDER — AMLODIPINE BESYLATE 5 MG/1
5 TABLET ORAL DAILY
Qty: 90 TABLET | Refills: 3 | Status: SHIPPED | OUTPATIENT
Start: 2020-12-11 | End: 2021-04-16

## 2020-12-11 NOTE — PROGRESS NOTES
Subjective:   Patient ID:  Andrés Casillas is a 70 y.o. male who presents for follow-up of No chief complaint on file.  Recent medication change include amlodipine 2.5 mg daily.  Patient denies chest pain, angina or symptoms associated with anginal equivalent.  Currently patient is taking lisinopril 40 mg daily and amlodipine 5 mg daily.  Blood pressure response last several days have been excellent will continue with this line of medication.  Patient here for follow-up evaluation hypertension.  Tachycardia past is resolving.  Normal stress test and intermittent mild palpitations with PVCs on recent Holter monitor.      Review of Systems   Constitution: Negative for chills, diaphoresis, night sweats, weight gain and weight loss.   HENT: Negative for congestion, hoarse voice, sore throat and stridor.    Eyes: Negative for double vision and pain.   Cardiovascular: Negative for chest pain, claudication, cyanosis, dyspnea on exertion, irregular heartbeat, leg swelling, near-syncope, orthopnea, palpitations, paroxysmal nocturnal dyspnea and syncope.   Respiratory: Negative for cough, hemoptysis, shortness of breath, sleep disturbances due to breathing, snoring, sputum production and wheezing.    Endocrine: Negative for cold intolerance, heat intolerance and polydipsia.   Hematologic/Lymphatic: Negative for bleeding problem. Does not bruise/bleed easily.   Skin: Negative for color change, dry skin and rash.   Musculoskeletal: Negative for joint swelling and muscle cramps.   Gastrointestinal: Negative for bloating, abdominal pain, constipation, diarrhea, dysphagia, melena, nausea and vomiting.   Genitourinary: Negative for flank pain and urgency.   Neurological: Negative for dizziness, focal weakness, headaches, light-headedness, loss of balance, seizures and weakness.   Psychiatric/Behavioral: Negative for altered mental status and memory loss. The patient is not nervous/anxious.      Family History   Problem Relation  Age of Onset    Heart disease Mother     Cancer Mother     Heart disease Father     Prostate cancer Brother      Past Medical History:   Diagnosis Date    Anxiety     BPH (benign prostatic hypertrophy)     DDD (degenerative disc disease), lumbar     Hypertension     GAURANG (obstructive sleep apnea)      Social History     Socioeconomic History    Marital status: Single     Spouse name: Not on file    Number of children: Not on file    Years of education: Not on file    Highest education level: Not on file   Occupational History     Employer: [employer]   Social Needs    Financial resource strain: Not very hard    Food insecurity     Worry: Never true     Inability: Never true    Transportation needs     Medical: No     Non-medical: No   Tobacco Use    Smoking status: Never Smoker    Smokeless tobacco: Never Used   Substance and Sexual Activity    Alcohol use: Yes     Alcohol/week: 1.7 standard drinks     Types: 2 Standard drinks or equivalent per week     Comment: socially    Drug use: No    Sexual activity: Yes     Partners: Female   Lifestyle    Physical activity     Days per week: 5 days     Minutes per session: 30 min    Stress: To some extent   Relationships    Social connections     Talks on phone: More than three times a week     Gets together: More than three times a week     Attends [employer] service: More than 4 times per year     Active member of club or organization: No     Attends meetings of clubs or organizations: Never     Relationship status:    Other Topics Concern    Not on file   Social History Narrative    ** Merged History Encounter **          Current Outpatient Medications on File Prior to Visit   Medication Sig Dispense Refill    ALPRAZolam (XANAX) 1 MG tablet Take 1 tablet (1 mg total) by mouth 3 (three) times daily. 90 tablet 5    amLODIPine (NORVASC) 2.5 MG tablet Take 1 tablet (2.5 mg total) by mouth once daily. 30 tablet 11    aspirin 325 MG tablet Take  325 mg by mouth.      co-enzyme Q-10 30 mg capsule Take 30 mg by mouth once daily.       fish oil-omega-3 fatty acids 300-1,000 mg capsule Take 1 capsule by mouth once daily.       furosemide (LASIX) 40 MG tablet TAKE 1 TABLET BY MOUTH DAILY AS NEEDED FOR FLUID RETENSION 90 tablet 4    gabapentin (NEURONTIN) 300 MG capsule Take 300 mg by mouth.      latanoprost 0.005 % ophthalmic solution Place 1 drop into both eyes nightly.      lisinopril (PRINIVIL,ZESTRIL) 20 MG tablet Take 1 tablet (20 mg total) by mouth once daily. 30 tablet 11    magnesium oxide (MAG-OX) 400 mg (241.3 mg magnesium) tablet Take 400 mg by mouth once daily.       tamsulosin (FLOMAX) 0.4 mg Cp24 0.4 mg.       testosterone cypionate (DEPOTESTOTERONE CYPIONATE) 100 mg/mL injection Inject into the muscle.      VENTOLIN HFA 90 mcg/actuation inhaler INHALE 2 PUFFS BY MOUTH EVERY 6 HOURS AS NEEDED FOR WHEEZE. RESCUE 18 g 12    zinc gluconate 50 mg tablet Take 50 mg by mouth.       No current facility-administered medications on file prior to visit.      Review of patient's allergies indicates:   Allergen Reactions    No known drug allergies        Objective:     Physical Exam   Constitutional: He is oriented to person, place, and time.   Eyes: Pupils are equal, round, and reactive to light.   Neck: Normal range of motion. No tracheal deviation present.   Cardiovascular: Normal rate, regular rhythm, normal heart sounds and intact distal pulses. Exam reveals no gallop and no friction rub.   No murmur heard.  Pulses:       Carotid pulses are 2+ on the right side and 2+ on the left side.       Radial pulses are 2+ on the right side and 2+ on the left side.        Femoral pulses are 2+ on the right side and 2+ on the left side.       Popliteal pulses are 2+ on the right side and 2+ on the left side.        Dorsalis pedis pulses are 2+ on the right side and 2+ on the left side.        Posterior tibial pulses are 2+ on the right side and 2+ on the  left side.   Pulmonary/Chest: Effort normal and breath sounds normal. No stridor. No respiratory distress. He has no wheezes. He has no rales. He exhibits no tenderness.   Abdominal: He exhibits no distension. There is no abdominal tenderness. There is no rebound.   Musculoskeletal:         General: No tenderness or edema.   Neurological: He is alert and oriented to person, place, and time.   Skin: Skin is warm and dry.       Assessment:     1. Abnormal ECG    2. Essential hypertension    3. RBBB    4. Palpitation    5. Tachycardia        Plan:     Abnormal ECG    Essential hypertension    RBBB    Palpitation    Tachycardia      Stable heart rate and blood pressure now on a lisinopril 40 mg daily and amlodipine 5 mg daily.  Medications have been renewed I will see the patient again in 3 months for review of blood pressure.

## 2020-12-22 ENCOUNTER — PATIENT MESSAGE (OUTPATIENT)
Dept: FAMILY MEDICINE | Facility: CLINIC | Age: 70
End: 2020-12-22

## 2021-01-26 ENCOUNTER — TELEPHONE (OUTPATIENT)
Dept: FAMILY MEDICINE | Facility: CLINIC | Age: 71
End: 2021-01-26

## 2021-02-09 ENCOUNTER — OFFICE VISIT (OUTPATIENT)
Dept: FAMILY MEDICINE | Facility: CLINIC | Age: 71
End: 2021-02-09
Payer: MEDICARE

## 2021-02-09 DIAGNOSIS — F13.20 BENZODIAZEPINE DEPENDENCE, CONTINUOUS: ICD-10-CM

## 2021-02-09 DIAGNOSIS — F41.9 ANXIETY: Primary | Chronic | ICD-10-CM

## 2021-02-09 DIAGNOSIS — Z79.899 ENCOUNTER FOR LONG-TERM (CURRENT) USE OF MEDICATIONS: ICD-10-CM

## 2021-02-09 PROCEDURE — 99442 PR PHYSICIAN TELEPHONE EVALUATION 11-20 MIN: CPT | Mod: 95,,, | Performed by: FAMILY MEDICINE

## 2021-02-09 PROCEDURE — 99442 PR PHYSICIAN TELEPHONE EVALUATION 11-20 MIN: ICD-10-PCS | Mod: 95,,, | Performed by: FAMILY MEDICINE

## 2021-02-09 RX ORDER — TADALAFIL 5 MG/1
5 TABLET ORAL DAILY
COMMUNITY
Start: 2020-12-30

## 2021-02-09 RX ORDER — ALPRAZOLAM 1 MG/1
1 TABLET ORAL 3 TIMES DAILY
Qty: 90 TABLET | Refills: 5 | Status: SHIPPED | OUTPATIENT
Start: 2021-02-09 | End: 2021-08-04 | Stop reason: SDUPTHER

## 2021-02-22 ENCOUNTER — TELEPHONE (OUTPATIENT)
Dept: FAMILY MEDICINE | Facility: CLINIC | Age: 71
End: 2021-02-22

## 2021-02-22 DIAGNOSIS — Z76.89 REFERRAL OF PATIENT: Primary | ICD-10-CM

## 2021-02-22 DIAGNOSIS — M62.830 SPASM OF THORACIC BACK MUSCLE: ICD-10-CM

## 2021-02-23 ENCOUNTER — TELEPHONE (OUTPATIENT)
Dept: FAMILY MEDICINE | Facility: CLINIC | Age: 71
End: 2021-02-23

## 2021-02-24 ENCOUNTER — TELEPHONE (OUTPATIENT)
Dept: FAMILY MEDICINE | Facility: CLINIC | Age: 71
End: 2021-02-24

## 2021-02-25 ENCOUNTER — TELEPHONE (OUTPATIENT)
Dept: FAMILY MEDICINE | Facility: CLINIC | Age: 71
End: 2021-02-25

## 2021-03-09 ENCOUNTER — TELEPHONE (OUTPATIENT)
Dept: FAMILY MEDICINE | Facility: CLINIC | Age: 71
End: 2021-03-09

## 2021-04-16 ENCOUNTER — OFFICE VISIT (OUTPATIENT)
Dept: CARDIOLOGY | Facility: CLINIC | Age: 71
End: 2021-04-16
Payer: MEDICARE

## 2021-04-16 VITALS
WEIGHT: 217.63 LBS | BODY MASS INDEX: 30.35 KG/M2 | HEART RATE: 72 BPM | SYSTOLIC BLOOD PRESSURE: 150 MMHG | DIASTOLIC BLOOD PRESSURE: 90 MMHG

## 2021-04-16 DIAGNOSIS — G47.33 OSA (OBSTRUCTIVE SLEEP APNEA): ICD-10-CM

## 2021-04-16 DIAGNOSIS — R00.2 PALPITATION: ICD-10-CM

## 2021-04-16 DIAGNOSIS — R94.31 ABNORMAL ECG: ICD-10-CM

## 2021-04-16 DIAGNOSIS — R00.0 TACHYCARDIA: ICD-10-CM

## 2021-04-16 DIAGNOSIS — F41.9 ANXIETY: ICD-10-CM

## 2021-04-16 DIAGNOSIS — I45.10 RBBB: ICD-10-CM

## 2021-04-16 DIAGNOSIS — I10 ESSENTIAL HYPERTENSION: Primary | ICD-10-CM

## 2021-04-16 PROCEDURE — 99215 OFFICE O/P EST HI 40 MIN: CPT | Mod: HCNC,S$GLB,, | Performed by: INTERNAL MEDICINE

## 2021-04-16 PROCEDURE — 3288F PR FALLS RISK ASSESSMENT DOCUMENTED: ICD-10-PCS | Mod: HCNC,CPTII,S$GLB, | Performed by: INTERNAL MEDICINE

## 2021-04-16 PROCEDURE — 1159F PR MEDICATION LIST DOCUMENTED IN MEDICAL RECORD: ICD-10-PCS | Mod: HCNC,S$GLB,, | Performed by: INTERNAL MEDICINE

## 2021-04-16 PROCEDURE — 1101F PR PT FALLS ASSESS DOC 0-1 FALLS W/OUT INJ PAST YR: ICD-10-PCS | Mod: HCNC,CPTII,S$GLB, | Performed by: INTERNAL MEDICINE

## 2021-04-16 PROCEDURE — 3288F FALL RISK ASSESSMENT DOCD: CPT | Mod: HCNC,CPTII,S$GLB, | Performed by: INTERNAL MEDICINE

## 2021-04-16 PROCEDURE — 3077F SYST BP >= 140 MM HG: CPT | Mod: HCNC,CPTII,S$GLB, | Performed by: INTERNAL MEDICINE

## 2021-04-16 PROCEDURE — 3008F BODY MASS INDEX DOCD: CPT | Mod: HCNC,CPTII,S$GLB, | Performed by: INTERNAL MEDICINE

## 2021-04-16 PROCEDURE — 3080F DIAST BP >= 90 MM HG: CPT | Mod: HCNC,CPTII,S$GLB, | Performed by: INTERNAL MEDICINE

## 2021-04-16 PROCEDURE — 3080F PR MOST RECENT DIASTOLIC BLOOD PRESSURE >= 90 MM HG: ICD-10-PCS | Mod: HCNC,CPTII,S$GLB, | Performed by: INTERNAL MEDICINE

## 2021-04-16 PROCEDURE — 1101F PT FALLS ASSESS-DOCD LE1/YR: CPT | Mod: HCNC,CPTII,S$GLB, | Performed by: INTERNAL MEDICINE

## 2021-04-16 PROCEDURE — 99999 PR PBB SHADOW E&M-EST. PATIENT-LVL III: CPT | Mod: PBBFAC,HCNC,, | Performed by: INTERNAL MEDICINE

## 2021-04-16 PROCEDURE — 1126F AMNT PAIN NOTED NONE PRSNT: CPT | Mod: HCNC,S$GLB,, | Performed by: INTERNAL MEDICINE

## 2021-04-16 PROCEDURE — 1159F MED LIST DOCD IN RCRD: CPT | Mod: HCNC,S$GLB,, | Performed by: INTERNAL MEDICINE

## 2021-04-16 PROCEDURE — 1126F PR PAIN SEVERITY QUANTIFIED, NO PAIN PRESENT: ICD-10-PCS | Mod: HCNC,S$GLB,, | Performed by: INTERNAL MEDICINE

## 2021-04-16 PROCEDURE — 99215 PR OFFICE/OUTPT VISIT, EST, LEVL V, 40-54 MIN: ICD-10-PCS | Mod: HCNC,S$GLB,, | Performed by: INTERNAL MEDICINE

## 2021-04-16 PROCEDURE — 99999 PR PBB SHADOW E&M-EST. PATIENT-LVL III: ICD-10-PCS | Mod: PBBFAC,HCNC,, | Performed by: INTERNAL MEDICINE

## 2021-04-16 PROCEDURE — 3008F PR BODY MASS INDEX (BMI) DOCUMENTED: ICD-10-PCS | Mod: HCNC,CPTII,S$GLB, | Performed by: INTERNAL MEDICINE

## 2021-04-16 PROCEDURE — 3077F PR MOST RECENT SYSTOLIC BLOOD PRESSURE >= 140 MM HG: ICD-10-PCS | Mod: HCNC,CPTII,S$GLB, | Performed by: INTERNAL MEDICINE

## 2021-04-16 RX ORDER — AMLODIPINE BESYLATE 10 MG/1
10 TABLET ORAL DAILY
Qty: 90 TABLET | Refills: 3 | Status: SHIPPED | OUTPATIENT
Start: 2021-04-16 | End: 2021-10-22

## 2021-05-21 ENCOUNTER — OFFICE VISIT (OUTPATIENT)
Dept: CARDIOLOGY | Facility: CLINIC | Age: 71
End: 2021-05-21
Payer: MEDICARE

## 2021-05-21 VITALS
HEIGHT: 71 IN | WEIGHT: 215.88 LBS | SYSTOLIC BLOOD PRESSURE: 134 MMHG | HEART RATE: 72 BPM | BODY MASS INDEX: 30.22 KG/M2 | DIASTOLIC BLOOD PRESSURE: 76 MMHG | OXYGEN SATURATION: 95 %

## 2021-05-21 DIAGNOSIS — R00.0 TACHYCARDIA: ICD-10-CM

## 2021-05-21 DIAGNOSIS — I10 ESSENTIAL HYPERTENSION: Primary | ICD-10-CM

## 2021-05-21 DIAGNOSIS — G47.33 OSA (OBSTRUCTIVE SLEEP APNEA): ICD-10-CM

## 2021-05-21 DIAGNOSIS — R94.31 ABNORMAL ECG: ICD-10-CM

## 2021-05-21 DIAGNOSIS — R00.2 PALPITATION: ICD-10-CM

## 2021-05-21 DIAGNOSIS — F41.9 ANXIETY: ICD-10-CM

## 2021-05-21 DIAGNOSIS — I45.10 RBBB: ICD-10-CM

## 2021-05-21 PROCEDURE — 99999 PR PBB SHADOW E&M-EST. PATIENT-LVL IV: CPT | Mod: PBBFAC,HCNC,, | Performed by: INTERNAL MEDICINE

## 2021-05-21 PROCEDURE — 99214 OFFICE O/P EST MOD 30 MIN: CPT | Mod: HCNC,S$GLB,, | Performed by: INTERNAL MEDICINE

## 2021-05-21 PROCEDURE — 1159F MED LIST DOCD IN RCRD: CPT | Mod: HCNC,S$GLB,, | Performed by: INTERNAL MEDICINE

## 2021-05-21 PROCEDURE — 3075F SYST BP GE 130 - 139MM HG: CPT | Mod: HCNC,CPTII,S$GLB, | Performed by: INTERNAL MEDICINE

## 2021-05-21 PROCEDURE — 3008F BODY MASS INDEX DOCD: CPT | Mod: HCNC,CPTII,S$GLB, | Performed by: INTERNAL MEDICINE

## 2021-05-21 PROCEDURE — 3078F DIAST BP <80 MM HG: CPT | Mod: HCNC,CPTII,S$GLB, | Performed by: INTERNAL MEDICINE

## 2021-05-21 PROCEDURE — 99999 PR PBB SHADOW E&M-EST. PATIENT-LVL IV: ICD-10-PCS | Mod: PBBFAC,HCNC,, | Performed by: INTERNAL MEDICINE

## 2021-05-21 PROCEDURE — 99214 PR OFFICE/OUTPT VISIT, EST, LEVL IV, 30-39 MIN: ICD-10-PCS | Mod: HCNC,S$GLB,, | Performed by: INTERNAL MEDICINE

## 2021-05-21 PROCEDURE — 3008F PR BODY MASS INDEX (BMI) DOCUMENTED: ICD-10-PCS | Mod: HCNC,CPTII,S$GLB, | Performed by: INTERNAL MEDICINE

## 2021-05-21 PROCEDURE — 1159F PR MEDICATION LIST DOCUMENTED IN MEDICAL RECORD: ICD-10-PCS | Mod: HCNC,S$GLB,, | Performed by: INTERNAL MEDICINE

## 2021-05-21 PROCEDURE — 3078F PR MOST RECENT DIASTOLIC BLOOD PRESSURE < 80 MM HG: ICD-10-PCS | Mod: HCNC,CPTII,S$GLB, | Performed by: INTERNAL MEDICINE

## 2021-05-21 PROCEDURE — 3075F PR MOST RECENT SYSTOLIC BLOOD PRESS GE 130-139MM HG: ICD-10-PCS | Mod: HCNC,CPTII,S$GLB, | Performed by: INTERNAL MEDICINE

## 2021-05-21 RX ORDER — DORZOLAMIDE HYDROCHLORIDE AND TIMOLOL MALEATE PRESERVATIVE FREE 20; 5 MG/ML; MG/ML
SOLUTION/ DROPS OPHTHALMIC
COMMUNITY
Start: 2021-04-16

## 2021-08-04 DIAGNOSIS — F41.9 ANXIETY: Chronic | ICD-10-CM

## 2021-08-04 DIAGNOSIS — Z79.899 ENCOUNTER FOR LONG-TERM (CURRENT) USE OF MEDICATIONS: ICD-10-CM

## 2021-08-04 RX ORDER — ALPRAZOLAM 1 MG/1
1 TABLET ORAL 3 TIMES DAILY
Qty: 90 TABLET | Refills: 5 | Status: SHIPPED | OUTPATIENT
Start: 2021-08-04 | End: 2021-10-22 | Stop reason: SDUPTHER

## 2021-08-20 ENCOUNTER — OFFICE VISIT (OUTPATIENT)
Dept: CARDIOLOGY | Facility: CLINIC | Age: 71
End: 2021-08-20
Payer: MEDICARE

## 2021-08-20 VITALS
WEIGHT: 222.69 LBS | BODY MASS INDEX: 31.18 KG/M2 | HEART RATE: 83 BPM | HEIGHT: 71 IN | SYSTOLIC BLOOD PRESSURE: 130 MMHG | OXYGEN SATURATION: 99 % | DIASTOLIC BLOOD PRESSURE: 70 MMHG

## 2021-08-20 DIAGNOSIS — R00.2 PALPITATION: ICD-10-CM

## 2021-08-20 DIAGNOSIS — F41.9 ANXIETY: ICD-10-CM

## 2021-08-20 DIAGNOSIS — I45.10 RBBB: ICD-10-CM

## 2021-08-20 DIAGNOSIS — I10 ESSENTIAL HYPERTENSION: Primary | ICD-10-CM

## 2021-08-20 DIAGNOSIS — G47.33 OSA (OBSTRUCTIVE SLEEP APNEA): ICD-10-CM

## 2021-08-20 PROCEDURE — 1160F RVW MEDS BY RX/DR IN RCRD: CPT | Mod: HCNC,CPTII,S$GLB, | Performed by: INTERNAL MEDICINE

## 2021-08-20 PROCEDURE — 1126F AMNT PAIN NOTED NONE PRSNT: CPT | Mod: HCNC,CPTII,S$GLB, | Performed by: INTERNAL MEDICINE

## 2021-08-20 PROCEDURE — 3008F PR BODY MASS INDEX (BMI) DOCUMENTED: ICD-10-PCS | Mod: HCNC,CPTII,S$GLB, | Performed by: INTERNAL MEDICINE

## 2021-08-20 PROCEDURE — 3288F PR FALLS RISK ASSESSMENT DOCUMENTED: ICD-10-PCS | Mod: HCNC,CPTII,S$GLB, | Performed by: INTERNAL MEDICINE

## 2021-08-20 PROCEDURE — 1101F PT FALLS ASSESS-DOCD LE1/YR: CPT | Mod: HCNC,CPTII,S$GLB, | Performed by: INTERNAL MEDICINE

## 2021-08-20 PROCEDURE — 1159F PR MEDICATION LIST DOCUMENTED IN MEDICAL RECORD: ICD-10-PCS | Mod: HCNC,CPTII,S$GLB, | Performed by: INTERNAL MEDICINE

## 2021-08-20 PROCEDURE — 99214 OFFICE O/P EST MOD 30 MIN: CPT | Mod: HCNC,S$GLB,, | Performed by: INTERNAL MEDICINE

## 2021-08-20 PROCEDURE — 1160F PR REVIEW ALL MEDS BY PRESCRIBER/CLIN PHARMACIST DOCUMENTED: ICD-10-PCS | Mod: HCNC,CPTII,S$GLB, | Performed by: INTERNAL MEDICINE

## 2021-08-20 PROCEDURE — 3075F SYST BP GE 130 - 139MM HG: CPT | Mod: HCNC,CPTII,S$GLB, | Performed by: INTERNAL MEDICINE

## 2021-08-20 PROCEDURE — 1126F PR PAIN SEVERITY QUANTIFIED, NO PAIN PRESENT: ICD-10-PCS | Mod: HCNC,CPTII,S$GLB, | Performed by: INTERNAL MEDICINE

## 2021-08-20 PROCEDURE — 99499 RISK ADDL DX/OHS AUDIT: ICD-10-PCS | Mod: HCNC,S$GLB,, | Performed by: INTERNAL MEDICINE

## 2021-08-20 PROCEDURE — 3078F DIAST BP <80 MM HG: CPT | Mod: HCNC,CPTII,S$GLB, | Performed by: INTERNAL MEDICINE

## 2021-08-20 PROCEDURE — 3288F FALL RISK ASSESSMENT DOCD: CPT | Mod: HCNC,CPTII,S$GLB, | Performed by: INTERNAL MEDICINE

## 2021-08-20 PROCEDURE — 99999 PR PBB SHADOW E&M-EST. PATIENT-LVL IV: ICD-10-PCS | Mod: PBBFAC,HCNC,, | Performed by: INTERNAL MEDICINE

## 2021-08-20 PROCEDURE — 99499 UNLISTED E&M SERVICE: CPT | Mod: HCNC,S$GLB,, | Performed by: INTERNAL MEDICINE

## 2021-08-20 PROCEDURE — 3075F PR MOST RECENT SYSTOLIC BLOOD PRESS GE 130-139MM HG: ICD-10-PCS | Mod: HCNC,CPTII,S$GLB, | Performed by: INTERNAL MEDICINE

## 2021-08-20 PROCEDURE — 3078F PR MOST RECENT DIASTOLIC BLOOD PRESSURE < 80 MM HG: ICD-10-PCS | Mod: HCNC,CPTII,S$GLB, | Performed by: INTERNAL MEDICINE

## 2021-08-20 PROCEDURE — 99214 PR OFFICE/OUTPT VISIT, EST, LEVL IV, 30-39 MIN: ICD-10-PCS | Mod: HCNC,S$GLB,, | Performed by: INTERNAL MEDICINE

## 2021-08-20 PROCEDURE — 1101F PR PT FALLS ASSESS DOC 0-1 FALLS W/OUT INJ PAST YR: ICD-10-PCS | Mod: HCNC,CPTII,S$GLB, | Performed by: INTERNAL MEDICINE

## 2021-08-20 PROCEDURE — 99999 PR PBB SHADOW E&M-EST. PATIENT-LVL IV: CPT | Mod: PBBFAC,HCNC,, | Performed by: INTERNAL MEDICINE

## 2021-08-20 PROCEDURE — 3008F BODY MASS INDEX DOCD: CPT | Mod: HCNC,CPTII,S$GLB, | Performed by: INTERNAL MEDICINE

## 2021-08-20 PROCEDURE — 1159F MED LIST DOCD IN RCRD: CPT | Mod: HCNC,CPTII,S$GLB, | Performed by: INTERNAL MEDICINE

## 2021-08-20 RX ORDER — LISINOPRIL 40 MG/1
TABLET ORAL
COMMUNITY
Start: 2021-06-04 | End: 2021-12-27

## 2021-08-21 ENCOUNTER — IMMUNIZATION (OUTPATIENT)
Dept: FAMILY MEDICINE | Facility: CLINIC | Age: 71
End: 2021-08-21
Payer: MEDICARE

## 2021-08-21 DIAGNOSIS — Z23 NEED FOR VACCINATION: Primary | ICD-10-CM

## 2021-08-21 PROCEDURE — 91300 COVID-19, MRNA, LNP-S, PF, 30 MCG/0.3 ML DOSE VACCINE: CPT | Mod: HCNC,,, | Performed by: FAMILY MEDICINE

## 2021-08-21 PROCEDURE — 0003A COVID-19, MRNA, LNP-S, PF, 30 MCG/0.3 ML DOSE VACCINE: ICD-10-PCS | Mod: HCNC,CV19,, | Performed by: FAMILY MEDICINE

## 2021-08-21 PROCEDURE — 91300 COVID-19, MRNA, LNP-S, PF, 30 MCG/0.3 ML DOSE VACCINE: ICD-10-PCS | Mod: HCNC,,, | Performed by: FAMILY MEDICINE

## 2021-08-21 PROCEDURE — 0003A COVID-19, MRNA, LNP-S, PF, 30 MCG/0.3 ML DOSE VACCINE: CPT | Mod: HCNC,CV19,, | Performed by: FAMILY MEDICINE

## 2021-09-24 ENCOUNTER — TELEPHONE (OUTPATIENT)
Dept: FAMILY MEDICINE | Facility: CLINIC | Age: 71
End: 2021-09-24

## 2021-09-24 DIAGNOSIS — G47.33 OSA (OBSTRUCTIVE SLEEP APNEA): Primary | ICD-10-CM

## 2021-10-22 ENCOUNTER — OFFICE VISIT (OUTPATIENT)
Dept: FAMILY MEDICINE | Facility: CLINIC | Age: 71
End: 2021-10-22
Payer: MEDICARE

## 2021-10-22 ENCOUNTER — LAB VISIT (OUTPATIENT)
Dept: LAB | Facility: HOSPITAL | Age: 71
End: 2021-10-22
Attending: FAMILY MEDICINE
Payer: MEDICARE

## 2021-10-22 VITALS
HEART RATE: 76 BPM | SYSTOLIC BLOOD PRESSURE: 129 MMHG | OXYGEN SATURATION: 96 % | TEMPERATURE: 98 F | HEIGHT: 71 IN | BODY MASS INDEX: 30.63 KG/M2 | DIASTOLIC BLOOD PRESSURE: 82 MMHG | WEIGHT: 218.81 LBS

## 2021-10-22 DIAGNOSIS — Z00.00 WELL ADULT EXAM: Primary | ICD-10-CM

## 2021-10-22 DIAGNOSIS — Z12.5 ENCOUNTER FOR PROSTATE CANCER SCREENING: ICD-10-CM

## 2021-10-22 DIAGNOSIS — I10 ESSENTIAL HYPERTENSION: Chronic | ICD-10-CM

## 2021-10-22 DIAGNOSIS — Z79.899 ENCOUNTER FOR LONG-TERM (CURRENT) USE OF MEDICATIONS: ICD-10-CM

## 2021-10-22 DIAGNOSIS — E55.9 VITAMIN D DEFICIENCY: ICD-10-CM

## 2021-10-22 DIAGNOSIS — E53.8 VITAMIN B12 DEFICIENCY: ICD-10-CM

## 2021-10-22 DIAGNOSIS — Z00.00 WELL ADULT EXAM: ICD-10-CM

## 2021-10-22 DIAGNOSIS — F41.9 ANXIETY: Chronic | ICD-10-CM

## 2021-10-22 DIAGNOSIS — Z00.01 ENCOUNTER FOR GENERAL ADULT MEDICAL EXAMINATION WITH ABNORMAL FINDINGS: ICD-10-CM

## 2021-10-22 DIAGNOSIS — Z13.6 ENCOUNTER FOR LIPID SCREENING FOR CARDIOVASCULAR DISEASE: ICD-10-CM

## 2021-10-22 DIAGNOSIS — Z23 NEED FOR VACCINATION: ICD-10-CM

## 2021-10-22 DIAGNOSIS — Z13.220 ENCOUNTER FOR LIPID SCREENING FOR CARDIOVASCULAR DISEASE: ICD-10-CM

## 2021-10-22 DIAGNOSIS — E34.9 HYPOTESTOSTERONEMIA: ICD-10-CM

## 2021-10-22 LAB
25(OH)D3+25(OH)D2 SERPL-MCNC: 75 NG/ML (ref 30–96)
ALBUMIN SERPL BCP-MCNC: 4.1 G/DL (ref 3.5–5.2)
ALP SERPL-CCNC: 82 U/L (ref 55–135)
ALT SERPL W/O P-5'-P-CCNC: 21 U/L (ref 10–44)
ANION GAP SERPL CALC-SCNC: 11 MMOL/L (ref 8–16)
AST SERPL-CCNC: 24 U/L (ref 10–40)
BILIRUB SERPL-MCNC: 0.8 MG/DL (ref 0.1–1)
BUN SERPL-MCNC: 18 MG/DL (ref 8–23)
CALCIUM SERPL-MCNC: 10 MG/DL (ref 8.7–10.5)
CHLORIDE SERPL-SCNC: 102 MMOL/L (ref 95–110)
CO2 SERPL-SCNC: 25 MMOL/L (ref 23–29)
COMPLEXED PSA SERPL-MCNC: 5.6 NG/ML (ref 0–4)
CREAT SERPL-MCNC: 1 MG/DL (ref 0.5–1.4)
EST. GFR  (AFRICAN AMERICAN): >60 ML/MIN/1.73 M^2
EST. GFR  (NON AFRICAN AMERICAN): >60 ML/MIN/1.73 M^2
ESTIMATED AVG GLUCOSE: 97 MG/DL (ref 68–131)
GLUCOSE SERPL-MCNC: 92 MG/DL (ref 70–110)
HBA1C MFR BLD: 5 % (ref 4–5.6)
POTASSIUM SERPL-SCNC: 4.8 MMOL/L (ref 3.5–5.1)
PROT SERPL-MCNC: 7.2 G/DL (ref 6–8.4)
SODIUM SERPL-SCNC: 138 MMOL/L (ref 136–145)
TESTOST SERPL-MCNC: >1500 NG/DL (ref 304–1227)
TSH SERPL DL<=0.005 MIU/L-ACNC: 1.66 UIU/ML (ref 0.4–4)
VIT B12 SERPL-MCNC: 816 PG/ML (ref 210–950)

## 2021-10-22 PROCEDURE — 90694 VACC AIIV4 NO PRSRV 0.5ML IM: CPT | Mod: HCNC,S$GLB,, | Performed by: FAMILY MEDICINE

## 2021-10-22 PROCEDURE — G0008 FLU VACCINE - QUADRIVALENT - ADJUVANTED: ICD-10-PCS | Mod: HCNC,S$GLB,, | Performed by: FAMILY MEDICINE

## 2021-10-22 PROCEDURE — 3074F SYST BP LT 130 MM HG: CPT | Mod: HCNC,CPTII,S$GLB, | Performed by: FAMILY MEDICINE

## 2021-10-22 PROCEDURE — 1126F PR PAIN SEVERITY QUANTIFIED, NO PAIN PRESENT: ICD-10-PCS | Mod: HCNC,CPTII,S$GLB, | Performed by: FAMILY MEDICINE

## 2021-10-22 PROCEDURE — 84403 ASSAY OF TOTAL TESTOSTERONE: CPT | Mod: HCNC | Performed by: FAMILY MEDICINE

## 2021-10-22 PROCEDURE — 80053 COMPREHEN METABOLIC PANEL: CPT | Mod: HCNC | Performed by: FAMILY MEDICINE

## 2021-10-22 PROCEDURE — 3074F PR MOST RECENT SYSTOLIC BLOOD PRESSURE < 130 MM HG: ICD-10-PCS | Mod: HCNC,CPTII,S$GLB, | Performed by: FAMILY MEDICINE

## 2021-10-22 PROCEDURE — 84443 ASSAY THYROID STIM HORMONE: CPT | Mod: HCNC | Performed by: FAMILY MEDICINE

## 2021-10-22 PROCEDURE — 82306 VITAMIN D 25 HYDROXY: CPT | Mod: HCNC | Performed by: FAMILY MEDICINE

## 2021-10-22 PROCEDURE — 36415 COLL VENOUS BLD VENIPUNCTURE: CPT | Mod: HCNC,PO | Performed by: FAMILY MEDICINE

## 2021-10-22 PROCEDURE — 1101F PR PT FALLS ASSESS DOC 0-1 FALLS W/OUT INJ PAST YR: ICD-10-PCS | Mod: HCNC,CPTII,S$GLB, | Performed by: FAMILY MEDICINE

## 2021-10-22 PROCEDURE — 1159F MED LIST DOCD IN RCRD: CPT | Mod: HCNC,CPTII,S$GLB, | Performed by: FAMILY MEDICINE

## 2021-10-22 PROCEDURE — 3288F PR FALLS RISK ASSESSMENT DOCUMENTED: ICD-10-PCS | Mod: HCNC,CPTII,S$GLB, | Performed by: FAMILY MEDICINE

## 2021-10-22 PROCEDURE — 84153 ASSAY OF PSA TOTAL: CPT | Mod: HCNC | Performed by: FAMILY MEDICINE

## 2021-10-22 PROCEDURE — 85027 COMPLETE CBC AUTOMATED: CPT | Mod: HCNC | Performed by: FAMILY MEDICINE

## 2021-10-22 PROCEDURE — 3079F DIAST BP 80-89 MM HG: CPT | Mod: HCNC,CPTII,S$GLB, | Performed by: FAMILY MEDICINE

## 2021-10-22 PROCEDURE — 83036 HEMOGLOBIN GLYCOSYLATED A1C: CPT | Mod: HCNC | Performed by: FAMILY MEDICINE

## 2021-10-22 PROCEDURE — 3008F BODY MASS INDEX DOCD: CPT | Mod: HCNC,CPTII,S$GLB, | Performed by: FAMILY MEDICINE

## 2021-10-22 PROCEDURE — 99397 PR PREVENTIVE VISIT,EST,65 & OVER: ICD-10-PCS | Mod: 25,HCNC,S$GLB, | Performed by: FAMILY MEDICINE

## 2021-10-22 PROCEDURE — 1126F AMNT PAIN NOTED NONE PRSNT: CPT | Mod: HCNC,CPTII,S$GLB, | Performed by: FAMILY MEDICINE

## 2021-10-22 PROCEDURE — 3288F FALL RISK ASSESSMENT DOCD: CPT | Mod: HCNC,CPTII,S$GLB, | Performed by: FAMILY MEDICINE

## 2021-10-22 PROCEDURE — 90694 FLU VACCINE - QUADRIVALENT - ADJUVANTED: ICD-10-PCS | Mod: HCNC,S$GLB,, | Performed by: FAMILY MEDICINE

## 2021-10-22 PROCEDURE — 99499 RISK ADDL DX/OHS AUDIT: ICD-10-PCS | Mod: HCNC,S$GLB,, | Performed by: FAMILY MEDICINE

## 2021-10-22 PROCEDURE — 1159F PR MEDICATION LIST DOCUMENTED IN MEDICAL RECORD: ICD-10-PCS | Mod: HCNC,CPTII,S$GLB, | Performed by: FAMILY MEDICINE

## 2021-10-22 PROCEDURE — 99499 UNLISTED E&M SERVICE: CPT | Mod: HCNC,S$GLB,, | Performed by: FAMILY MEDICINE

## 2021-10-22 PROCEDURE — 4010F ACE/ARB THERAPY RXD/TAKEN: CPT | Mod: HCNC,CPTII,S$GLB, | Performed by: FAMILY MEDICINE

## 2021-10-22 PROCEDURE — 3008F PR BODY MASS INDEX (BMI) DOCUMENTED: ICD-10-PCS | Mod: HCNC,CPTII,S$GLB, | Performed by: FAMILY MEDICINE

## 2021-10-22 PROCEDURE — 82607 VITAMIN B-12: CPT | Mod: HCNC | Performed by: FAMILY MEDICINE

## 2021-10-22 PROCEDURE — 3079F PR MOST RECENT DIASTOLIC BLOOD PRESSURE 80-89 MM HG: ICD-10-PCS | Mod: HCNC,CPTII,S$GLB, | Performed by: FAMILY MEDICINE

## 2021-10-22 PROCEDURE — 99397 PER PM REEVAL EST PAT 65+ YR: CPT | Mod: 25,HCNC,S$GLB, | Performed by: FAMILY MEDICINE

## 2021-10-22 PROCEDURE — 1101F PT FALLS ASSESS-DOCD LE1/YR: CPT | Mod: HCNC,CPTII,S$GLB, | Performed by: FAMILY MEDICINE

## 2021-10-22 PROCEDURE — 4010F PR ACE/ARB THEARPY RXD/TAKEN: ICD-10-PCS | Mod: HCNC,CPTII,S$GLB, | Performed by: FAMILY MEDICINE

## 2021-10-22 PROCEDURE — 99999 PR PBB SHADOW E&M-EST. PATIENT-LVL IV: CPT | Mod: PBBFAC,HCNC,, | Performed by: FAMILY MEDICINE

## 2021-10-22 PROCEDURE — 99999 PR PBB SHADOW E&M-EST. PATIENT-LVL IV: ICD-10-PCS | Mod: PBBFAC,HCNC,, | Performed by: FAMILY MEDICINE

## 2021-10-22 PROCEDURE — G0008 ADMIN INFLUENZA VIRUS VAC: HCPCS | Mod: HCNC,S$GLB,, | Performed by: FAMILY MEDICINE

## 2021-10-22 RX ORDER — AMLODIPINE BESYLATE 5 MG/1
TABLET ORAL
COMMUNITY
Start: 2021-09-04 | End: 2022-05-27

## 2021-10-22 RX ORDER — ALPRAZOLAM 1 MG/1
1 TABLET ORAL 3 TIMES DAILY
Qty: 90 TABLET | Refills: 5 | Status: SHIPPED | OUTPATIENT
Start: 2021-10-22 | End: 2022-01-28 | Stop reason: SDUPTHER

## 2021-10-23 LAB
ERYTHROCYTE [DISTWIDTH] IN BLOOD BY AUTOMATED COUNT: 13.2 % (ref 11.5–14.5)
HCT VFR BLD AUTO: 52 % (ref 40–54)
HGB BLD-MCNC: 17 G/DL (ref 14–18)
MCH RBC QN AUTO: 31.1 PG (ref 27–31)
MCHC RBC AUTO-ENTMCNC: 32.7 G/DL (ref 32–36)
MCV RBC AUTO: 95 FL (ref 82–98)
PLATELET # BLD AUTO: 255 K/UL (ref 150–450)
PMV BLD AUTO: 10.7 FL (ref 9.2–12.9)
RBC # BLD AUTO: 5.47 M/UL (ref 4.6–6.2)
WBC # BLD AUTO: 4.41 K/UL (ref 3.9–12.7)

## 2021-10-25 ENCOUNTER — TELEPHONE (OUTPATIENT)
Dept: UROLOGY | Facility: CLINIC | Age: 71
End: 2021-10-25
Payer: MEDICARE

## 2021-10-25 ENCOUNTER — TELEPHONE (OUTPATIENT)
Dept: FAMILY MEDICINE | Facility: CLINIC | Age: 71
End: 2021-10-25
Payer: MEDICARE

## 2021-11-12 ENCOUNTER — OFFICE VISIT (OUTPATIENT)
Dept: UROLOGY | Facility: CLINIC | Age: 71
End: 2021-11-12
Payer: MEDICARE

## 2021-11-12 ENCOUNTER — LAB VISIT (OUTPATIENT)
Dept: LAB | Facility: HOSPITAL | Age: 71
End: 2021-11-12
Attending: UROLOGY
Payer: MEDICARE

## 2021-11-12 VITALS — HEIGHT: 71 IN | WEIGHT: 218.69 LBS | BODY MASS INDEX: 30.62 KG/M2

## 2021-11-12 DIAGNOSIS — R79.89 LOW TESTOSTERONE LEVEL IN MALE: ICD-10-CM

## 2021-11-12 DIAGNOSIS — R97.20 ELEVATED PSA: ICD-10-CM

## 2021-11-12 DIAGNOSIS — N40.0 BPH WITHOUT URINARY OBSTRUCTION: ICD-10-CM

## 2021-11-12 DIAGNOSIS — R97.20 ELEVATED PSA: Primary | ICD-10-CM

## 2021-11-12 LAB
BILIRUB SERPL-MCNC: ABNORMAL MG/DL
BLOOD URINE, POC: ABNORMAL
CLARITY, POC UA: CLEAR
COLOR, POC UA: YELLOW
COMPLEXED PSA SERPL-MCNC: 4.5 NG/ML (ref 0–4)
GLUCOSE UR QL STRIP: ABNORMAL
KETONES UR QL STRIP: ABNORMAL
LEUKOCYTE ESTERASE URINE, POC: ABNORMAL
NITRITE, POC UA: ABNORMAL
PH, POC UA: 6.5
PROTEIN, POC: ABNORMAL
SPECIFIC GRAVITY, POC UA: 1.02
UROBILINOGEN, POC UA: 0.2

## 2021-11-12 PROCEDURE — 1160F RVW MEDS BY RX/DR IN RCRD: CPT | Mod: HCNC,CPTII,S$GLB, | Performed by: UROLOGY

## 2021-11-12 PROCEDURE — 1159F MED LIST DOCD IN RCRD: CPT | Mod: HCNC,CPTII,S$GLB, | Performed by: UROLOGY

## 2021-11-12 PROCEDURE — 1101F PT FALLS ASSESS-DOCD LE1/YR: CPT | Mod: HCNC,CPTII,S$GLB, | Performed by: UROLOGY

## 2021-11-12 PROCEDURE — 1159F PR MEDICATION LIST DOCUMENTED IN MEDICAL RECORD: ICD-10-PCS | Mod: HCNC,CPTII,S$GLB, | Performed by: UROLOGY

## 2021-11-12 PROCEDURE — 84153 ASSAY OF PSA TOTAL: CPT | Mod: HCNC | Performed by: UROLOGY

## 2021-11-12 PROCEDURE — 99999 PR PBB SHADOW E&M-EST. PATIENT-LVL III: ICD-10-PCS | Mod: PBBFAC,HCNC,, | Performed by: UROLOGY

## 2021-11-12 PROCEDURE — 3044F HG A1C LEVEL LT 7.0%: CPT | Mod: HCNC,CPTII,S$GLB, | Performed by: UROLOGY

## 2021-11-12 PROCEDURE — 3288F PR FALLS RISK ASSESSMENT DOCUMENTED: ICD-10-PCS | Mod: HCNC,CPTII,S$GLB, | Performed by: UROLOGY

## 2021-11-12 PROCEDURE — 3008F BODY MASS INDEX DOCD: CPT | Mod: HCNC,CPTII,S$GLB, | Performed by: UROLOGY

## 2021-11-12 PROCEDURE — 1126F PR PAIN SEVERITY QUANTIFIED, NO PAIN PRESENT: ICD-10-PCS | Mod: HCNC,CPTII,S$GLB, | Performed by: UROLOGY

## 2021-11-12 PROCEDURE — 1160F PR REVIEW ALL MEDS BY PRESCRIBER/CLIN PHARMACIST DOCUMENTED: ICD-10-PCS | Mod: HCNC,CPTII,S$GLB, | Performed by: UROLOGY

## 2021-11-12 PROCEDURE — 81002 URINALYSIS NONAUTO W/O SCOPE: CPT | Mod: HCNC,S$GLB,, | Performed by: UROLOGY

## 2021-11-12 PROCEDURE — 4010F ACE/ARB THERAPY RXD/TAKEN: CPT | Mod: HCNC,CPTII,S$GLB, | Performed by: UROLOGY

## 2021-11-12 PROCEDURE — 1101F PR PT FALLS ASSESS DOC 0-1 FALLS W/OUT INJ PAST YR: ICD-10-PCS | Mod: HCNC,CPTII,S$GLB, | Performed by: UROLOGY

## 2021-11-12 PROCEDURE — 3008F PR BODY MASS INDEX (BMI) DOCUMENTED: ICD-10-PCS | Mod: HCNC,CPTII,S$GLB, | Performed by: UROLOGY

## 2021-11-12 PROCEDURE — 3288F FALL RISK ASSESSMENT DOCD: CPT | Mod: HCNC,CPTII,S$GLB, | Performed by: UROLOGY

## 2021-11-12 PROCEDURE — 99999 PR PBB SHADOW E&M-EST. PATIENT-LVL III: CPT | Mod: PBBFAC,HCNC,, | Performed by: UROLOGY

## 2021-11-12 PROCEDURE — 81002 POCT URINE DIPSTICK WITHOUT MICROSCOPE: ICD-10-PCS | Mod: HCNC,S$GLB,, | Performed by: UROLOGY

## 2021-11-12 PROCEDURE — 3044F PR MOST RECENT HEMOGLOBIN A1C LEVEL <7.0%: ICD-10-PCS | Mod: HCNC,CPTII,S$GLB, | Performed by: UROLOGY

## 2021-11-12 PROCEDURE — 1126F AMNT PAIN NOTED NONE PRSNT: CPT | Mod: HCNC,CPTII,S$GLB, | Performed by: UROLOGY

## 2021-11-12 PROCEDURE — 4010F PR ACE/ARB THEARPY RXD/TAKEN: ICD-10-PCS | Mod: HCNC,CPTII,S$GLB, | Performed by: UROLOGY

## 2021-11-12 PROCEDURE — 99214 OFFICE O/P EST MOD 30 MIN: CPT | Mod: HCNC,S$GLB,, | Performed by: UROLOGY

## 2021-11-12 PROCEDURE — 36415 COLL VENOUS BLD VENIPUNCTURE: CPT | Mod: HCNC,PO | Performed by: UROLOGY

## 2021-11-12 PROCEDURE — 99214 PR OFFICE/OUTPT VISIT, EST, LEVL IV, 30-39 MIN: ICD-10-PCS | Mod: HCNC,S$GLB,, | Performed by: UROLOGY

## 2021-11-16 ENCOUNTER — PATIENT MESSAGE (OUTPATIENT)
Dept: UROLOGY | Facility: CLINIC | Age: 71
End: 2021-11-16
Payer: MEDICARE

## 2021-11-24 DIAGNOSIS — R94.31 ABNORMAL ECG: ICD-10-CM

## 2021-11-24 DIAGNOSIS — R00.2 PALPITATION: ICD-10-CM

## 2021-11-24 DIAGNOSIS — I10 PRIMARY HYPERTENSION: Primary | ICD-10-CM

## 2021-11-26 ENCOUNTER — HOSPITAL ENCOUNTER (OUTPATIENT)
Dept: CARDIOLOGY | Facility: HOSPITAL | Age: 71
Discharge: HOME | End: 2021-11-26
Attending: INTERNAL MEDICINE
Payer: MEDICARE

## 2021-11-26 ENCOUNTER — OFFICE VISIT (OUTPATIENT)
Dept: CARDIOLOGY | Facility: CLINIC | Age: 71
End: 2021-11-26
Payer: MEDICARE

## 2021-11-26 VITALS
HEART RATE: 84 BPM | DIASTOLIC BLOOD PRESSURE: 70 MMHG | BODY MASS INDEX: 29.96 KG/M2 | WEIGHT: 214 LBS | HEIGHT: 71 IN | OXYGEN SATURATION: 95 % | SYSTOLIC BLOOD PRESSURE: 120 MMHG | RESPIRATION RATE: 16 BRPM

## 2021-11-26 DIAGNOSIS — I10 ESSENTIAL HYPERTENSION: ICD-10-CM

## 2021-11-26 DIAGNOSIS — R00.2 PALPITATION: ICD-10-CM

## 2021-11-26 DIAGNOSIS — I10 PRIMARY HYPERTENSION: ICD-10-CM

## 2021-11-26 DIAGNOSIS — R94.31 ABNORMAL ECG: ICD-10-CM

## 2021-11-26 DIAGNOSIS — G47.33 OSA (OBSTRUCTIVE SLEEP APNEA): ICD-10-CM

## 2021-11-26 DIAGNOSIS — I10 PRIMARY HYPERTENSION: Primary | ICD-10-CM

## 2021-11-26 DIAGNOSIS — R00.0 TACHYCARDIA: ICD-10-CM

## 2021-11-26 DIAGNOSIS — I45.10 RBBB: ICD-10-CM

## 2021-11-26 PROCEDURE — 4010F ACE/ARB THERAPY RXD/TAKEN: CPT | Mod: HCNC,CPTII,S$GLB, | Performed by: INTERNAL MEDICINE

## 2021-11-26 PROCEDURE — 93010 ELECTROCARDIOGRAM REPORT: CPT | Mod: HCNC,,, | Performed by: INTERNAL MEDICINE

## 2021-11-26 PROCEDURE — 4010F PR ACE/ARB THEARPY RXD/TAKEN: ICD-10-PCS | Mod: HCNC,CPTII,S$GLB, | Performed by: INTERNAL MEDICINE

## 2021-11-26 PROCEDURE — 93005 ELECTROCARDIOGRAM TRACING: CPT | Mod: HCNC,PO

## 2021-11-26 PROCEDURE — 99999 PR PBB SHADOW E&M-EST. PATIENT-LVL III: CPT | Mod: PBBFAC,HCNC,, | Performed by: INTERNAL MEDICINE

## 2021-11-26 PROCEDURE — 99214 OFFICE O/P EST MOD 30 MIN: CPT | Mod: HCNC,S$GLB,, | Performed by: INTERNAL MEDICINE

## 2021-11-26 PROCEDURE — 93010 EKG 12-LEAD: ICD-10-PCS | Mod: HCNC,,, | Performed by: INTERNAL MEDICINE

## 2021-11-26 PROCEDURE — 99214 PR OFFICE/OUTPT VISIT, EST, LEVL IV, 30-39 MIN: ICD-10-PCS | Mod: HCNC,S$GLB,, | Performed by: INTERNAL MEDICINE

## 2021-11-26 PROCEDURE — 99999 PR PBB SHADOW E&M-EST. PATIENT-LVL III: ICD-10-PCS | Mod: PBBFAC,HCNC,, | Performed by: INTERNAL MEDICINE

## 2021-12-29 RX ORDER — ALBUTEROL SULFATE 90 UG/1
AEROSOL, METERED RESPIRATORY (INHALATION)
Qty: 18 G | Refills: 0 | Status: SHIPPED | OUTPATIENT
Start: 2021-12-29 | End: 2022-02-04

## 2022-01-07 NOTE — PROGRESS NOTES
Andrés Casillas  was seen at the request of  No ref. provider found for sleep evaluation.    09/21/2018 INITIAL HISTORY OF PRESENT ILLNESS:  Andrés Casillas is a 68 y.o. male is here to be evaluated for a sleep disorder.       CHIEF COMPLAINT:      The patient's complaints include excessive daytime sleepiness, excessive daytime fatigue, snoring,  witnessed breathing pauses,  gasping for air in sleep and interrupted sleep.  Diagnosed GAURANG many years ago.     Lost 40 lbs - now pressure feels too high and the mask is leaking since he lost weight from his face.    Had accusome - moderate GAURANG by 4A criteria; mild GAURANG by 4B criteria;     Using APAP  Resmed Airsence 5-15; 90% compliance; AHI 0.2; 90% pressure was 14.2    EPWORTH SLEEPINESS SCALE 9/21/2018   Sitting and reading 1   Watching TV 1   Sitting, inactive in a public place (e.g. a theatre or a meeting) 0   As a passenger in a car for an hour without a break 0   Lying down to rest in the afternoon when circumstances permit 3   Sitting and talking to someone 0   Sitting quietly after a lunch without alcohol 3   In a car, while stopped for a few minutes in traffic 0   Total score 8       SLEEP ROUTINE AND LIFESTYLE 03/13/2019 :  Sleep Clinic New Patient 9/21/2018   What time do you go to bed on a week day? (Give a range) 10 to 11 pm   What time do you go to bed on a day off? (Give a range) 10 to 11 pm   How long does it take you to fall asleep? (Give a range) Immediately   On average, how many times per night do you wake up? 5   How long does it take you to fall back into sleep? (Give a range) A few minutes   What time do you wake up to start your day on a week day? (Give a range) 7:30 am   What time do you wake up to start your day on a day off? (Give a range) 7:30 am   What time do you get out of bed? (Give a range) 8:30 am   On average, how many hours do you sleep? 7 to 9   On average, how many naps do you take per day? 0   Rate your sleep quality from 0 to 5  (0-poor, 5-great). 4   Have you experienced:  Weight loss?   How much weight have you lost or gained (in lbs.) in the last year? 40   On average, how many times per night do you go to the bathroom?  1 to 2   Have you ever had a sleep study/CPAP machine/surgery for sleep apnea? Yes   Have you ever had a CPAP machine for sleep apnea? Yes   Have you ever had surgery for sleep apnea? No       Sleep Clinic ROS  9/21/2018   Difficulty breathing through the nose?  Yes   Sore throat or dry mouth in the morning? Yes   Irregular or very fast heart beat?  No   Shortness of breath?  No   Acid reflux? No   Body aches and pains?  Yes   Morning headaches? No   Dizziness? Sometimes   Mood changes?  Yes   Do you exercise?  Sometimes   Do you feel like moving your legs a lot?  No          Denies symptoms concerning for parasomnia      The patient never had tonsillectomy, adenoidectomy or UPPP       INTERVAL HISTORY:    03/13/2019:  The patient has not presented any new complaints since the previous visit.   Sometimes subjective lack of air.  5-17 on his Resmed.  90% was 15 cm H2O  Still struggling with mouth leaks - even with Quattro FFM  Got chins trap (unistrap) - can't use - not comfortable  180/180 d  8 hrs use on average  Ramp 4 for 45 min  15 min  He has not got Mask liner  Yet.    Social History:  Occupation:  Bed partner:   Exercise routine:   Caffeine:  Coffee   , cokes  , tea       PREVIOUS SLEEP STUDIES:     HST 8/15/18: Significant Obstructive sleep apnea (GAURANG) with AHI (apnea hypopnea Index) of 17.2 and SaO2 of 89% .  Previous baseline N/a.      DME: [employer] - wants to switch      PAST MEDICAL HISTORY:    Active Ambulatory Problems     Diagnosis Date Noted    Hypertension     BPH (benign prostatic hypertrophy)     GAURANG (obstructive sleep apnea)      Resolved Ambulatory Problems     Diagnosis Date Noted    No Resolved Ambulatory Problems     Past Medical History:   Diagnosis Date    Anxiety     BPH (benign prostatic  "hypertrophy)     DDD (degenerative disc disease), lumbar     Hypertension     GAURANG (obstructive sleep apnea)                 PAST SURGICAL HISTORY:    Past Surgical History:   Procedure Laterality Date    JOINT REPLACEMENT           FAMILY HISTORY:                Family History   Problem Relation Age of Onset    Heart disease Mother     Cancer Mother     Heart disease Father     Prostate cancer Brother        SOCIAL HISTORY:          Tobacco:   Social History     Tobacco Use   Smoking Status Never Smoker   Smokeless Tobacco Never Used       alcohol use:    Social History     Substance and Sexual Activity   Alcohol Use Yes    Alcohol/week: 1.0 oz    Types: 2 Standard drinks or equivalent per week    Comment: socially                   ALLERGIES:    Review of patient's allergies indicates:   Allergen Reactions    No known drug allergies        CURRENT MEDICATIONS:    Current Outpatient Medications   Medication Sig Dispense Refill    albuterol 90 mcg/actuation inhaler Inhale 2 puffs into the lungs every 6 (six) hours as needed for Wheezing. Rescue 18 g 4    ALPRAZolam (XANAX) 1 MG tablet Take 1 tablet (1 mg total) by mouth 3 (three) times daily. 90 tablet 5    celecoxib (CELEBREX) 200 MG capsule Take 200 mg by mouth 2 (two) times daily.  2    furosemide (LASIX) 40 MG tablet 40 mg daily as needed.   3    tamsulosin (FLOMAX) 0.4 mg Cp24        No current facility-administered medications for this visit.                         PHYSICAL EXAM:  /87   Pulse 72   Ht 5' 11" (1.803 m)   Wt 102.6 kg (226 lb 4.8 oz)   BMI 31.56 kg/m²   GENERAL: Overweight body habitus, well groomed.  HEENT:   HEENT:  Conjunctivae are non-erythematous; Pupils equal, round, and reactive to light; Nose is symmetrical; Nasal mucosa is pink and moist; Septum is midline; Inferior turbinates are normal; Nasal airflow is normal; Posterior pharynx is pink; Modified Mallampati:III-IV; Posterior palate is low; Tonsils not " "visualized; Uvula is wide and elongated;Tongue is enlarged; Dentition is fair; No TMJ tenderness; Jaw opening and protrusion without click and without discomfort.  NECK: Supple. Neck circumference is 16 inches. No thyromegaly. No palpable nodes.     SKIN: On face and neck: No abrasions, no rashes, no lesions.  No subcutaneous nodules are palpable.  RESPIRATORY: Chest is clear to auscultation.  Normal chest expansion and non-labored breathing at rest.  CARDIOVASCULAR: Normal S1, S2.  No murmurs, gallops or rubs. No carotid bruits bilaterally.  No edema. No clubbing. No cyanosis.    NEURO: Oriented to time, place and person. Normal attention span and concentration. Gait normal.    PSYCH: Affect is full. Mood is normal  MUSCULOSKELETAL: Moves 4 extremities. Gait normal.         Using [facility]: no    ASSESSMENT:    1. GAURANG (obstructive sleep apnea). The patient symptomatically has  excessive daytime sleepiness, snoring,  witnessed breathing pauses, excessive daytime fatigue, gasping for air in sleep, interrupted sleep and nocturia  with exam findings of "a crowded oral airway and elevated body mass index.Some subjective lack of air.        PLAN:  Increase APAP 5-15 to 7-17; change ramp 5 cm from 45 min down to 15 min.  CPAP Comfort Cover (full face size) - company or online; same as Red Carefusion chin strap - he took the picture with his phone.  Chin strap CPAP - [employer] [facility], or [employer], or the one close to home    If he is still uncomfortable with settings, will order titration study.    More than 15 minutes of this 30 minutes visit was spent in counseling: during our discussion today, we talked about the etiology of  GAURANG as well as the potential ramifications of untreated sleep apnea, which could include daytime sleepiness, hypertension, heart disease and/or stroke.  We discussed potential treatment options, which could include weight loss, body positioning, continuous positive airway pressure (CPAP), or " referral for surgical consideration. Meanwhile, he  is urged to avoid supine sleep, weight gain and alcoholic beverages since all of these can worsen GAURANG.     Precautions: The patient was advised to abstain from driving should he feel sleepy or drowsy.    Follow up: MD/NP  after the sleep study has been completed.     Thank you for allowing me the opportunity to participate in the care of your patient.    This visit summary will be sent to referring provider via [employer]       normal...

## 2022-01-24 ENCOUNTER — TELEPHONE (OUTPATIENT)
Dept: UROLOGY | Facility: CLINIC | Age: 72
End: 2022-01-24
Payer: MEDICARE

## 2022-01-24 NOTE — TELEPHONE ENCOUNTER
----- Message from Graciela Orlando sent at 1/24/2022 12:17 PM CST -----  Patient Call Back    Who Called: PT     What is the request in detail: Pt calling to speak with someone regarding his PSA and Testerone rechecked. The pt stated that he would like to be scheduled in [place]. Please call the pt regarding his concerns.     Can the clinic reply by [facility]? no    Best Call Back Number: [phone]

## 2022-01-27 NOTE — TELEPHONE ENCOUNTER
No new care gaps identified.  Powered by [employer]. Reference number: [id number].   1/27/2022 1:09:32 AM CST

## 2022-01-28 DIAGNOSIS — Z79.899 ENCOUNTER FOR LONG-TERM (CURRENT) USE OF MEDICATIONS: ICD-10-CM

## 2022-01-28 DIAGNOSIS — F41.9 ANXIETY: Chronic | ICD-10-CM

## 2022-01-28 RX ORDER — ALPRAZOLAM 1 MG/1
1 TABLET ORAL 3 TIMES DAILY
Qty: 90 TABLET | Refills: 5 | Status: SHIPPED | OUTPATIENT
Start: 2022-01-28 | End: 2022-05-27 | Stop reason: SDUPTHER

## 2022-01-28 NOTE — TELEPHONE ENCOUNTER
----- Message from Sherlyn Chaudhary sent at 1/28/2022 11:32 AM CST -----  Contact: pt  Type:  RX Refill Request    Who Called: pt   Refill or New Rx:refill   RX Name and Strength:alprazolam1 mg   How is the patient currently taking it? (ex. 1XDay):3 times daily  Is this a 30 day or 90 day RX: 90 days  Pref[facility] [place]  Local or Mail Order: local   Ordering Provider:vannessa   Would the patient rather a call back or a response via [facility]? phone  Best Call Back Number: [phone]  Additional Information: call when its been called in to pharm       [id number] [place] - [address] [address]  Phone: [phone] Fax: [phone]

## 2022-01-28 NOTE — TELEPHONE ENCOUNTER
No new care gaps identified.  Powered by [employer]. Reference number: [id number].   1/28/2022 11:41:01 AM CST

## 2022-02-04 RX ORDER — ALBUTEROL SULFATE 90 UG/1
AEROSOL, METERED RESPIRATORY (INHALATION)
Qty: 54 G | Refills: 3 | Status: SHIPPED | OUTPATIENT
Start: 2022-02-04 | End: 2023-02-16

## 2022-02-04 NOTE — TELEPHONE ENCOUNTER
Refill Authorization Note   Andrés Casillas  is requesting a refill authorization.  Brief Assessment and Rationale for Refill:  Approve     Medication Therapy Plan:       Medication Reconciliation Completed: No   Comments:   --->Care Gap information included below if applicable.   Orders Placed This Encounter    albuterol (PROVENTIL/VENTOLIN HFA) 90 mcg/actuation inhaler      Requested Prescriptions   Signed Prescriptions Disp Refills    albuterol (PROVENTIL/VENTOLIN HFA) 90 mcg/actuation inhaler 54 g 3     Sig: INHALE 2 PUFFS BY MOUTH EVERY 6 HOURS AS NEEDED FOR WHEEZING       Pulmonology:  Beta Agonists Passed - 1/27/2022  1:08 AM        Passed - Patient is at least 18 years old        Passed - Last Heart Rate in normal range within 360 days     Pulse Readings from Last 1 Encounters:   11/26/21 84              Passed - Valid encounter within last 15 months     Recent Visits  Date Type Provider Dept   10/22/21 Office Visit Pedro Pablo Dc MD [facility]   02/09/21 Office Visit Pedro Pablo Dc MD [facility]   10/16/20 Office Visit Pedro Pablo Dc MD [facility]   07/13/20 Office Visit Pedro Pablo Dc MD [facility]   Showing recent visits within past 720 days and meeting all other requirements  Future Appointments  No visits were found meeting these conditions.  Showing future appointments within next 150 days and meeting all other requirements      Future Appointments              In 2 months MD [place] [facility] - [facility], [place]    In 3 months MD Kathy [facility]illoond - Cardiology, [place] [facility]    In 8 months Pedro Pablo Dc MD [place] [facility], [place]                    Appointments  past 12m or future 3m with PCP    Date Provider   Last Visit   10/22/2021 Pedro Pablo Dc MD   Next Visit   1/28/2022 Pedro Pablo Dc MD   ED visits in past 90 days: 0     Note composed:10:32 AM 02/04/2022

## 2022-02-16 ENCOUNTER — TELEPHONE (OUTPATIENT)
Dept: FAMILY MEDICINE | Facility: CLINIC | Age: 72
End: 2022-02-16
Payer: MEDICARE

## 2022-02-16 NOTE — PROGRESS NOTES
Called pt 2 times and pt hung up the phone. Will try reaching out to pt in a few days  [facility] [employer].    Kerri INGRAM MA     The patient location is: Home  The chief complaint leading to consultation is:Upper rsp congestion   Visit type: Virtual visit with synchronous audio   Total time spent with patient: 15 minutes  Each patient to whom he or she provides medical services by telemedicine is:  (1) informed of the relationship between the physician and patient and the respective role of any other health care provider with respect to management of the patient; and (2) notified that he or she may decline to receive medical services by telemedicine and may withdraw from such care at any time.    Notes:   Andrés Casillas presents with moderate upper respiratory congestion,rhinnorhea,moderate cough past 4-5 days. No dyspnea Denies nausea,vomiting,diarrhea or significant fever.Covid risk 3. Covid test negative    Past Medical History:   Diagnosis Date    Anxiety     BPH (benign prostatic hypertrophy)     DDD (degenerative disc disease), lumbar     Hypertension     GAURANG (obstructive sleep apnea)      Past Surgical History:   Procedure Laterality Date    JOINT REPLACEMENT       Review of patient's allergies indicates:   Allergen Reactions    No known drug allergies      Current Outpatient Medications on File Prior to Visit   Medication Sig Dispense Refill    albuterol (PROVENTIL/VENTOLIN HFA) 90 mcg/actuation inhaler INHALE 2 PUFFS BY MOUTH EVERY 6 HOURS AS NEEDED FOR WHEEZING 54 g 3    ALPRAZolam (XANAX) 1 MG tablet Take 1 tablet (1 mg total) by mouth 3 (three) times daily. 90 tablet 5    amLODIPine (NORVASC) 5 MG tablet       co-enzyme Q-10 30 mg capsule Take 30 mg by mouth once daily.       dorzolamide-timolol, PF, (COSOPT PF) 2-0.5 % Dpet ophthalmic solution       fish oil-omega-3 fatty acids 300-1,000 mg capsule Take 1 capsule by mouth once daily.       furosemide (LASIX) 40 MG tablet TAKE 1 TABLET BY MOUTH DAILY AS NEEDED FOR FLUID RETENSION 90 tablet 4    latanoprost 0.005 % ophthalmic solution Place 1 drop into both eyes  nightly.      lisinopriL (PRINIVIL,ZESTRIL) 40 MG tablet TAKE 1 TABLET BY MOUTH EVERY DAY 90 tablet 3    magnesium oxide (MAG-OX) 400 mg (241.3 mg magnesium) tablet Take 400 mg by mouth once daily.       tadalafiL (CIALIS) 5 MG tablet Take 5 mg by mouth once daily.      tamsulosin (FLOMAX) 0.4 mg Cp24 0.4 mg.       testosterone cypionate (DEPOTESTOTERONE CYPIONATE) 100 mg/mL injection Inject into the muscle.      zinc gluconate 50 mg tablet Take 50 mg by mouth.       No current facility-administered medications on file prior to visit.     Social History     Socioeconomic History    Marital status: Single   Occupational History     Employer: [employer]   Tobacco Use    Smoking status: Never Smoker    Smokeless tobacco: Never Used   Substance and Sexual Activity    Alcohol use: Yes     Alcohol/week: 1.7 standard drinks     Types: 2 Standard drinks or equivalent per week     Comment: socially    Drug use: No    Sexual activity: Yes     Partners: Female   Social History Narrative    ** Merged History Encounter **          Family History   Problem Relation Age of Onset    Heart disease Mother     Cancer Mother     Heart disease Father     Prostate cancer Brother          ROS:  SKIN: No rashes, itching or changes in color or texture of skin.  EYES: Visual acuity fine. No photophobia, ocular pain or diplopia.EARS: Denies ear pain, discharge or vertigo.NOSE: No loss of smell, no epistaxis some postnasal drip.MOUTH & THROAT: No hoarseness or change in voice. No excessive gum bleeding.CHEST: Denies RIVAS, cyanosis, wheezing  CARDIOVASCULAR: Denies chest pain, PND, orthopnea or reduced exercise tolerance.  ABDOMEN:  No weight loss.No abdominal pain, no hematemesis or blood in stool.  URINARY: No flank pain, dysuria or hematuria.  PERIPHERAL VASCULAR: No claudication or cyanosis.  MUSCULOSKELETAL: Negative   NEUROLOGIC: No history of seizures, paralysis, alteration of gait or coordination.    PE:   Chest:No  tachypnea. No wheezing, rhonchi on forced expiration  Abdomen:Soft, non tender to patient palpation  Diagnoses and all orders for this visit:    Upper respiratory tract infection, unspecified type    Other orders  -     methylPREDNISolone (MEDROL DOSEPACK) 4 mg tablet; use as directed  -     montelukast (SINGULAIR) 10 mg tablet; Take 1 tablet (10 mg total) by mouth every evening.

## 2022-02-16 NOTE — TELEPHONE ENCOUNTER
----- Message from Nata Velez sent at 2/16/2022 12:36 PM CST -----  Regarding: appt  Contact: pt  Type:  Same Day Appointment Request    Caller is requesting a same day appointment.  Caller declined first available appointment listed below.    Name of Caller: pt  When is the first available appointment?  Symptoms:Cold symptoms, wheezing, cough  Best Call Back Number:[phone]  Additional Information: n/a

## 2022-02-17 ENCOUNTER — TELEPHONE (OUTPATIENT)
Dept: UROLOGY | Facility: CLINIC | Age: 72
End: 2022-02-17
Payer: MEDICARE

## 2022-02-17 ENCOUNTER — OFFICE VISIT (OUTPATIENT)
Dept: FAMILY MEDICINE | Facility: CLINIC | Age: 72
End: 2022-02-17
Payer: MEDICARE

## 2022-02-17 DIAGNOSIS — R97.20 ELEVATED PSA: Primary | ICD-10-CM

## 2022-02-17 DIAGNOSIS — J06.9 UPPER RESPIRATORY TRACT INFECTION, UNSPECIFIED TYPE: Primary | ICD-10-CM

## 2022-02-17 DIAGNOSIS — R79.89 LOW TESTOSTERONE LEVEL IN MALE: ICD-10-CM

## 2022-02-17 PROCEDURE — 99442 PR PHYSICIAN TELEPHONE EVALUATION 11-20 MIN: CPT | Mod: HCNC,95,, | Performed by: FAMILY MEDICINE

## 2022-02-17 PROCEDURE — 99442 PR PHYSICIAN TELEPHONE EVALUATION 11-20 MIN: ICD-10-PCS | Mod: HCNC,95,, | Performed by: FAMILY MEDICINE

## 2022-02-17 RX ORDER — METHYLPREDNISOLONE 4 MG/1
TABLET ORAL
Qty: 1 EACH | Refills: 0 | Status: SHIPPED | OUTPATIENT
Start: 2022-02-17 | End: 2022-03-10

## 2022-02-17 RX ORDER — MONTELUKAST SODIUM 10 MG/1
10 TABLET ORAL NIGHTLY
Qty: 30 TABLET | Refills: 0 | Status: SHIPPED | OUTPATIENT
Start: 2022-02-17 | End: 2022-03-15 | Stop reason: SDUPTHER

## 2022-02-17 NOTE — TELEPHONE ENCOUNTER
Spoke with patient, appointment for labs in [place] scheduled for patient. Patient expressed understanding.

## 2022-02-17 NOTE — TELEPHONE ENCOUNTER
----- Message from Inocente Berman sent at 2/17/2022  2:14 PM CST -----  Contact: Self  Type: Needs Medical Advice  Who Called:  Patient  Best Call Back Number: [phone]   Additional Information: Called to have PSA checked as discussed in last visit. Would like order in system and labs scheduled in [facility]

## 2022-02-18 ENCOUNTER — LAB VISIT (OUTPATIENT)
Dept: LAB | Facility: HOSPITAL | Age: 72
End: 2022-02-18
Attending: FAMILY MEDICINE
Payer: MEDICARE

## 2022-02-18 DIAGNOSIS — R79.89 LOW TESTOSTERONE LEVEL IN MALE: ICD-10-CM

## 2022-02-18 DIAGNOSIS — R97.20 ELEVATED PSA: ICD-10-CM

## 2022-02-18 LAB
COMPLEXED PSA SERPL-MCNC: 3.6 NG/ML (ref 0–4)
TESTOST SERPL-MCNC: 189 NG/DL (ref 304–1227)

## 2022-02-18 PROCEDURE — 84153 ASSAY OF PSA TOTAL: CPT | Mod: HCNC | Performed by: UROLOGY

## 2022-02-18 PROCEDURE — 36415 COLL VENOUS BLD VENIPUNCTURE: CPT | Mod: HCNC,PO | Performed by: UROLOGY

## 2022-02-18 PROCEDURE — 84403 ASSAY OF TOTAL TESTOSTERONE: CPT | Mod: HCNC | Performed by: UROLOGY

## 2022-03-11 NOTE — TELEPHONE ENCOUNTER
No new care gaps identified.  Powered by [employer] by [employer]. Reference number: [id number].   3/11/2022 11:33:01 AM CST

## 2022-03-15 RX ORDER — MONTELUKAST SODIUM 10 MG/1
TABLET ORAL
Qty: 90 TABLET | Refills: 2 | Status: SHIPPED | OUTPATIENT
Start: 2022-03-15

## 2022-03-16 NOTE — TELEPHONE ENCOUNTER
Refill Routing Note   Medication(s) are not appropriate for processing by [facility] for the following reason(s):      - Drug-Disease Interaction (montelukast and Anxiety)    [facility] action(s):  Defer Medication-related problems identified: Drug-disease interaction        --->Care Gap information included in message below if applicable.   Medication reconciliation completed: No   Automatic Epic Generated Protocol Data:        Requested Prescriptions   Pending Prescriptions Disp Refills    montelukast (SINGULAIR) 10 mg tablet [Pharmacy Med Name: MONTELUKAST SOD 10 MG TABLET] 90 tablet 2     Sig: TAKE 1 TABLET BY MOUTH EVERY DAY IN THE EVENING       Pulmonology:  Leukotriene Inhibitors Passed - 3/15/2022  7:44 PM        Passed - Patient is at least 18 years old        Passed - Valid encounter within last 15 months     Recent Visits  Date Type Provider Dept   10/22/21 Office Visit Pedro Pablo Dc MD [facility]   02/09/21 Office Visit Pedro Pablo Dc MD [facility]   10/16/20 Office Visit Pedro Pablo Dc MD [facility]   07/13/20 Office Visit Pedro Pablo Dc MD [facility]   Showing recent visits within past 720 days and meeting all other requirements  Future Appointments  No visits were found meeting these conditions.  Showing future appointments within next 150 days and meeting all other requirements      Future Appointments              In 1 month MD [facility] - Family Medicine, [place]    In 2 months MD [facility] - Cardiology, [facility]    In 7 months Pedro Pablo Dc MD [place] [facility], [place]                      Appointments  past 12m or future 3m with PCP    Date Provider   Last Visit   10/22/2021 Pedro Pablo Dc MD   Next Visit   4/25/2022 Pedro Pablo Dc MD   ED visits in past 90 days: 0        Note composed:7:45 PM 03/15/2022

## 2022-05-27 ENCOUNTER — LAB VISIT (OUTPATIENT)
Dept: LAB | Facility: HOSPITAL | Age: 72
End: 2022-05-27
Attending: FAMILY MEDICINE
Payer: MEDICARE

## 2022-05-27 ENCOUNTER — OFFICE VISIT (OUTPATIENT)
Dept: FAMILY MEDICINE | Facility: CLINIC | Age: 72
End: 2022-05-27
Payer: MEDICARE

## 2022-05-27 VITALS
SYSTOLIC BLOOD PRESSURE: 120 MMHG | TEMPERATURE: 98 F | DIASTOLIC BLOOD PRESSURE: 80 MMHG | WEIGHT: 218 LBS | OXYGEN SATURATION: 97 % | BODY MASS INDEX: 30.52 KG/M2 | HEART RATE: 80 BPM | HEIGHT: 71 IN

## 2022-05-27 DIAGNOSIS — Z79.899 ENCOUNTER FOR LONG-TERM (CURRENT) USE OF MEDICATIONS: ICD-10-CM

## 2022-05-27 DIAGNOSIS — I10 PRIMARY HYPERTENSION: Chronic | ICD-10-CM

## 2022-05-27 DIAGNOSIS — F41.9 ANXIETY: Chronic | ICD-10-CM

## 2022-05-27 DIAGNOSIS — Z11.59 NEED FOR HEPATITIS C SCREENING TEST: ICD-10-CM

## 2022-05-27 DIAGNOSIS — Z12.5 ENCOUNTER FOR PROSTATE CANCER SCREENING: ICD-10-CM

## 2022-05-27 DIAGNOSIS — Z13.6 ENCOUNTER FOR LIPID SCREENING FOR CARDIOVASCULAR DISEASE: ICD-10-CM

## 2022-05-27 DIAGNOSIS — Z13.220 ENCOUNTER FOR LIPID SCREENING FOR CARDIOVASCULAR DISEASE: ICD-10-CM

## 2022-05-27 DIAGNOSIS — Z00.01 ENCOUNTER FOR GENERAL ADULT MEDICAL EXAMINATION WITH ABNORMAL FINDINGS: ICD-10-CM

## 2022-05-27 DIAGNOSIS — I10 ESSENTIAL HYPERTENSION: Chronic | ICD-10-CM

## 2022-05-27 DIAGNOSIS — I10 ESSENTIAL HYPERTENSION: Primary | ICD-10-CM

## 2022-05-27 LAB
ALBUMIN SERPL BCP-MCNC: 4.2 G/DL (ref 3.5–5.2)
ALP SERPL-CCNC: 103 U/L (ref 55–135)
ALT SERPL W/O P-5'-P-CCNC: 32 U/L (ref 10–44)
ANION GAP SERPL CALC-SCNC: 10 MMOL/L (ref 8–16)
AST SERPL-CCNC: 24 U/L (ref 10–40)
BILIRUB SERPL-MCNC: 0.5 MG/DL (ref 0.1–1)
BUN SERPL-MCNC: 18 MG/DL (ref 8–23)
CALCIUM SERPL-MCNC: 9.4 MG/DL (ref 8.7–10.5)
CHLORIDE SERPL-SCNC: 102 MMOL/L (ref 95–110)
CHOLEST SERPL-MCNC: 174 MG/DL (ref 120–199)
CHOLEST/HDLC SERPL: 3.4 {RATIO} (ref 2–5)
CO2 SERPL-SCNC: 25 MMOL/L (ref 23–29)
COMPLEXED PSA SERPL-MCNC: 3.4 NG/ML (ref 0–4)
CREAT SERPL-MCNC: 0.9 MG/DL (ref 0.5–1.4)
EST. GFR  (AFRICAN AMERICAN): >60 ML/MIN/1.73 M^2
EST. GFR  (NON AFRICAN AMERICAN): >60 ML/MIN/1.73 M^2
ESTIMATED AVG GLUCOSE: 100 MG/DL (ref 68–131)
GLUCOSE SERPL-MCNC: 94 MG/DL (ref 70–110)
HBA1C MFR BLD: 5.1 % (ref 4–5.6)
HDLC SERPL-MCNC: 51 MG/DL (ref 40–75)
HDLC SERPL: 29.3 % (ref 20–50)
LDLC SERPL CALC-MCNC: 107 MG/DL (ref 63–159)
NONHDLC SERPL-MCNC: 123 MG/DL
POTASSIUM SERPL-SCNC: 4.8 MMOL/L (ref 3.5–5.1)
PROT SERPL-MCNC: 6.7 G/DL (ref 6–8.4)
SODIUM SERPL-SCNC: 137 MMOL/L (ref 136–145)
TRIGL SERPL-MCNC: 80 MG/DL (ref 30–150)

## 2022-05-27 PROCEDURE — 86803 HEPATITIS C AB TEST: CPT | Performed by: FAMILY MEDICINE

## 2022-05-27 PROCEDURE — 84153 ASSAY OF PSA TOTAL: CPT | Performed by: FAMILY MEDICINE

## 2022-05-27 PROCEDURE — 1160F PR REVIEW ALL MEDS BY PRESCRIBER/CLIN PHARMACIST DOCUMENTED: ICD-10-PCS | Mod: CPTII,S$GLB,, | Performed by: FAMILY MEDICINE

## 2022-05-27 PROCEDURE — 99999 PR PBB SHADOW E&M-EST. PATIENT-LVL V: ICD-10-PCS | Mod: PBBFAC,,, | Performed by: FAMILY MEDICINE

## 2022-05-27 PROCEDURE — 80061 LIPID PANEL: CPT | Performed by: FAMILY MEDICINE

## 2022-05-27 PROCEDURE — 4010F PR ACE/ARB THEARPY RXD/TAKEN: ICD-10-PCS | Mod: CPTII,S$GLB,, | Performed by: FAMILY MEDICINE

## 2022-05-27 PROCEDURE — 1159F MED LIST DOCD IN RCRD: CPT | Mod: CPTII,S$GLB,, | Performed by: FAMILY MEDICINE

## 2022-05-27 PROCEDURE — 1160F RVW MEDS BY RX/DR IN RCRD: CPT | Mod: CPTII,S$GLB,, | Performed by: FAMILY MEDICINE

## 2022-05-27 PROCEDURE — 1159F PR MEDICATION LIST DOCUMENTED IN MEDICAL RECORD: ICD-10-PCS | Mod: CPTII,S$GLB,, | Performed by: FAMILY MEDICINE

## 2022-05-27 PROCEDURE — 3074F SYST BP LT 130 MM HG: CPT | Mod: CPTII,S$GLB,, | Performed by: FAMILY MEDICINE

## 2022-05-27 PROCEDURE — 3079F DIAST BP 80-89 MM HG: CPT | Mod: CPTII,S$GLB,, | Performed by: FAMILY MEDICINE

## 2022-05-27 PROCEDURE — 1126F AMNT PAIN NOTED NONE PRSNT: CPT | Mod: CPTII,S$GLB,, | Performed by: FAMILY MEDICINE

## 2022-05-27 PROCEDURE — 99214 PR OFFICE/OUTPT VISIT, EST, LEVL IV, 30-39 MIN: ICD-10-PCS | Mod: S$GLB,,, | Performed by: FAMILY MEDICINE

## 2022-05-27 PROCEDURE — 4010F ACE/ARB THERAPY RXD/TAKEN: CPT | Mod: CPTII,S$GLB,, | Performed by: FAMILY MEDICINE

## 2022-05-27 PROCEDURE — 3074F PR MOST RECENT SYSTOLIC BLOOD PRESSURE < 130 MM HG: ICD-10-PCS | Mod: CPTII,S$GLB,, | Performed by: FAMILY MEDICINE

## 2022-05-27 PROCEDURE — 1101F PT FALLS ASSESS-DOCD LE1/YR: CPT | Mod: CPTII,S$GLB,, | Performed by: FAMILY MEDICINE

## 2022-05-27 PROCEDURE — 3008F PR BODY MASS INDEX (BMI) DOCUMENTED: ICD-10-PCS | Mod: CPTII,S$GLB,, | Performed by: FAMILY MEDICINE

## 2022-05-27 PROCEDURE — 83036 HEMOGLOBIN GLYCOSYLATED A1C: CPT | Performed by: FAMILY MEDICINE

## 2022-05-27 PROCEDURE — 1101F PR PT FALLS ASSESS DOC 0-1 FALLS W/OUT INJ PAST YR: ICD-10-PCS | Mod: CPTII,S$GLB,, | Performed by: FAMILY MEDICINE

## 2022-05-27 PROCEDURE — 36415 COLL VENOUS BLD VENIPUNCTURE: CPT | Mod: PO | Performed by: FAMILY MEDICINE

## 2022-05-27 PROCEDURE — 99214 OFFICE O/P EST MOD 30 MIN: CPT | Mod: S$GLB,,, | Performed by: FAMILY MEDICINE

## 2022-05-27 PROCEDURE — 3079F PR MOST RECENT DIASTOLIC BLOOD PRESSURE 80-89 MM HG: ICD-10-PCS | Mod: CPTII,S$GLB,, | Performed by: FAMILY MEDICINE

## 2022-05-27 PROCEDURE — 1126F PR PAIN SEVERITY QUANTIFIED, NO PAIN PRESENT: ICD-10-PCS | Mod: CPTII,S$GLB,, | Performed by: FAMILY MEDICINE

## 2022-05-27 PROCEDURE — 3288F PR FALLS RISK ASSESSMENT DOCUMENTED: ICD-10-PCS | Mod: CPTII,S$GLB,, | Performed by: FAMILY MEDICINE

## 2022-05-27 PROCEDURE — 99999 PR PBB SHADOW E&M-EST. PATIENT-LVL V: CPT | Mod: PBBFAC,,, | Performed by: FAMILY MEDICINE

## 2022-05-27 PROCEDURE — 3288F FALL RISK ASSESSMENT DOCD: CPT | Mod: CPTII,S$GLB,, | Performed by: FAMILY MEDICINE

## 2022-05-27 PROCEDURE — 3008F BODY MASS INDEX DOCD: CPT | Mod: CPTII,S$GLB,, | Performed by: FAMILY MEDICINE

## 2022-05-27 PROCEDURE — 80053 COMPREHEN METABOLIC PANEL: CPT | Performed by: FAMILY MEDICINE

## 2022-05-27 RX ORDER — TERBINAFINE HYDROCHLORIDE 250 MG/1
TABLET ORAL
COMMUNITY
Start: 2022-05-12

## 2022-05-27 RX ORDER — ALPRAZOLAM 1 MG/1
1 TABLET ORAL 3 TIMES DAILY
Qty: 90 TABLET | Refills: 5 | Status: SHIPPED | OUTPATIENT
Start: 2022-05-27 | End: 2022-08-10 | Stop reason: SDUPTHER

## 2022-05-27 NOTE — PATIENT INSTRUCTIONS
Follow up in about 6 months (around 11/27/2022), or if symptoms worsen or fail to improve, for Annual Wellness Exam.     Dear patient,   As a result of recent federal legislation (The Federal Cures Act), you may receive lab or pathology results from your visit in your [facility] account before your physician is able to contact you. Your physician or their representative will relay the results to you with their recommendations at their soonest availability.     If no improvement in symptoms or symptoms worsen, please be advised to call MD, follow-up at clinic and/or go to ER if becomes severe.    Pedro Pablo Dc M.D.        We Offer TELEHEALTH & Same Day Appointments!   Book your Telehealth appointment with me through my nurse or   Clinic appointments on [employer]!!    [address] [place] [address]    Office: [phone]   FAX: [phone]    Check out my Facebook Page and Follow Me at: [email or web address]    Check out my website at [employer] by clicking on: [email or web address]    To Schedule appointments online, go to [facility]: [email or web address]

## 2022-05-27 NOTE — PROGRESS NOTES
PLAN:      Problem List Items Addressed This Visit     Hypertension (Chronic)     Continue lisinopril amlodipine.Counseled on importance of hypertension disease course, I recommend ongoing Education for DASH-diet and exercise.  Counseled on medication regimen importance of treating high blood pressure.  Please be advised of risk of untreated blood pressure as discussed.  Please advised of ER precautions were given for symptoms of hypertensive urgency and emergency.             Anxiety (Chronic)     Refill Xanax for six months.  Follow-up in six months.  Discussed condition course and signs and symptoms to expect.  Patient advised of risk and benefits of medication use.  ER precautions.  Call MD or follow-up to clinic if not improving or worsening symptoms.             Relevant Medications    ALPRAZolam (XANAX) 1 MG tablet    Encounter for long-term (current) use of medications (Chronic)     Complete history and physical was completed today.  Complete and thorough medication reconciliation was performed.  Discussed risks and benefits of medications.  Advised patient on orders and health maintenance.  We discussed old records and old labs if available.  Will request any records not available through epic.  Continue current medications listed on your summary sheet.             Relevant Medications    ALPRAZolam (XANAX) 1 MG tablet    Need for hepatitis C screening test     Check hepatitis-C antibody.           Relevant Orders    Hepatitis C Antibody      Other Visit Diagnoses     Essential hypertension    -  Primary        Future Appointments     Date Provider Specialty Appt Notes    6/13/2022 Arlene Santacruz MD Sleep Medicine follow up     10/24/2022 Pedro Pablo Dc MD Family Medicine annual    11/30/2022 Pedro Pablo Dc MD Family Medicine 6 month f/u         Medication Management for assessment above:   Medication List with Changes/Refills   Current Medications    ALBUTEROL (PROVENTIL/VENTOLIN HFA) 90  MCG/ACTUATION INHALER    INHALE 2 PUFFS BY MOUTH EVERY 6 HOURS AS NEEDED FOR WHEEZING    AMLODIPINE (NORVASC) 10 MG TABLET    TAKE 1 TABLET BY MOUTH EVERY DAY    CO-ENZYME Q-10 30 MG CAPSULE    Take 30 mg by mouth once daily.     DORZOLAMIDE-TIMOLOL, PF, (COSOPT PF) 2-0.5 % DPET OPHTHALMIC SOLUTION        FISH OIL-OMEGA-3 FATTY ACIDS 300-1,000 MG CAPSULE    Take 1 capsule by mouth once daily.     FUROSEMIDE (LASIX) 40 MG TABLET    TAKE 1 TABLET BY MOUTH DAILY AS NEEDED FOR FLUID RETENSION    LATANOPROST 0.005 % OPHTHALMIC SOLUTION    Place 1 drop into both eyes nightly.    LISINOPRIL (PRINIVIL,ZESTRIL) 40 MG TABLET    TAKE 1 TABLET BY MOUTH EVERY DAY    MAGNESIUM OXIDE (MAG-OX) 400 MG (241.3 MG MAGNESIUM) TABLET    Take 400 mg by mouth once daily.     MONTELUKAST (SINGULAIR) 10 MG TABLET    TAKE 1 TABLET BY MOUTH EVERY DAY IN THE EVENING    TADALAFIL (CIALIS) 5 MG TABLET    Take 5 mg by mouth once daily.    TAMSULOSIN (FLOMAX) 0.4 MG CP24    0.4 mg.     TERBINAFINE HCL (LAMISIL) 250 MG TABLET    TAKE 1 TABLET BY MOUTH AS DIRECTED TAKE CONSECUTIVE 10 DAYS OF A MONTH X 3 MONTHS    TESTOSTERONE CYPIONATE (DEPOTESTOTERONE CYPIONATE) 100 MG/ML INJECTION    Inject into the muscle.    ZINC GLUCONATE 50 MG TABLET    Take 50 mg by mouth.   Changed and/or Refilled Medications    Modified Medication Previous Medication    ALPRAZOLAM (XANAX) 1 MG TABLET ALPRAZolam (XANAX) 1 MG tablet       Take 1 tablet (1 mg total) by mouth 3 (three) times daily.    Take 1 tablet (1 mg total) by mouth 3 (three) times daily.   Discontinued Medications    AMLODIPINE (NORVASC) 5 MG TABLET           Pedro Pablo Dc M.D.  ==========================================================================  Subjective:   Patient ID: Andrés Casillas is a 72 y.o. male.  has a past medical history of Anxiety, BPH (benign prostatic hypertrophy), DDD (degenerative disc disease), lumbar, Hypertension, and GAURANG (obstructive sleep apnea).   Chief Complaint:  Medication Refill      Problem List Items Addressed This Visit     Hypertension (Chronic)    Overview     CHRONIC. STABLE. BP Reviewed.  Compliant with BP medications. No SE reported.  May 2022:  Blood pressure well controlled on current regimen including amlodipine.  July 2020:  Compliant with lisinopril 20 mg daily.  No cough.  (-) CP, SOB, palpitations, dizziness, lightheadedness, HA, arm numbness, tingling or weakness, syncope.  Creatinine   Date Value Ref Range Status   07/14/2020 1.0 0.5 - 1.4 mg/dL Final                Current Assessment & Plan     Continue lisinopril amlodipine.Counseled on importance of hypertension disease course, I recommend ongoing Education for DASH-diet and exercise.  Counseled on medication regimen importance of treating high blood pressure.  Please be advised of risk of untreated blood pressure as discussed.  Please advised of ER precautions were given for symptoms of hypertensive urgency and emergency.             Anxiety (Chronic)    Overview     Chronic.  Stable.  Patient on chronic benzodiazepine from previous PCP.  Transitioning care to me.The patient was checked in the [facility] Board of Pharmacy's  Prescription Monitoring Program. There appears to be no incongruities with the patient's verbalized history.   No abuse suspected.  February 2021:  Patient had PVCs on cardiac workup.  Patient reports cardiology has adjusted medications that he is doing much better.  Blood pressure has been well controlled.  May 2022:  Patient is here for medication refills.  He continues to have symptoms that are intermittently controlled with medication.           Current Assessment & Plan     Refill Xanax for six months.  Follow-up in six months.  Discussed condition course and signs and symptoms to expect.  Patient advised of risk and benefits of medication use.  ER precautions.  Call MD or follow-up to clinic if not improving or worsening symptoms.             Encounter for long-term  (current) use of medications (Chronic)    Overview     Initial HPI:  CHRONIC. Stable. Compliant with medications for managed conditions. See medication list. No SE reported. Routine lab analysis is being monitored. Refills were addressed.February 2021:CHRONIC. Stable. Compliant with medications for managed conditions. See medication list. No SE reported. Routine lab analysis is being monitored. Refills were addressed.  October 2021:  Reviewed labs.  May 2022:  Reviewed labs.  Lab Results   Component Value Date    WBC 4.41 10/22/2021    HGB 17.0 10/22/2021    HCT 52.0 10/22/2021    MCV 95 10/22/2021     10/22/2021         Chemistry        Component Value Date/Time     10/22/2021 1423    K 4.8 10/22/2021 1423     10/22/2021 1423    CO2 25 10/22/2021 1423    BUN 18 10/22/2021 1423    CREATININE 1.0 10/22/2021 1423    GLU 92 10/22/2021 1423        Component Value Date/Time    CALCIUM 10.0 10/22/2021 1423    ALKPHOS 82 10/22/2021 1423    AST 24 10/22/2021 1423    ALT 21 10/22/2021 1423    BILITOT 0.8 10/22/2021 1423    ESTGFRAFRICA >60.0 10/22/2021 1423    EGFRNONAA >60.0 10/22/2021 1423          Lab Results   Component Value Date    TSH 1.661 10/22/2021    I9GXOHW 6.5 06/30/2011                Current Assessment & Plan     Complete history and physical was completed today.  Complete and thorough medication reconciliation was performed.  Discussed risks and benefits of medications.  Advised patient on orders and health maintenance.  We discussed old records and old labs if available.  Will request any records not available through epic.  Continue current medications listed on your summary sheet.             Need for hepatitis C screening test    Overview     Patient is due for hepatitis C screening.   Patient advised of risk of soraya hepatitis C including being Born between 1943 and 1965, blood transfusions prior to 1992, IV or nasal drug use, tattoos, sexual intercourse.  Patient would like to  be tested.  Patient reports possible exposure to hepatitis-C through sexual transmission.    Lab Results   Component Value Date    HEPAIGM Negative 05/21/2014    HEPBIGM Negative 05/21/2014    HEPBCAB Negative 01/23/2009    HEPCAB Negative 05/21/2014                Current Assessment & Plan     Check hepatitis-C antibody.             Other Visit Diagnoses     Essential hypertension    -  Primary           Review of patient's allergies indicates:   Allergen Reactions    No known drug allergies      Current Outpatient Medications   Medication Instructions    albuterol (PROVENTIL/VENTOLIN HFA) 90 mcg/actuation inhaler INHALE 2 PUFFS BY MOUTH EVERY 6 HOURS AS NEEDED FOR WHEEZING    ALPRAZolam (XANAX) 1 mg, Oral, 3 times daily    amLODIPine (NORVASC) 10 MG tablet TAKE 1 TABLET BY MOUTH EVERY DAY    co-enzyme Q-10 30 mg, Oral, Daily    dorzolamide-timolol, PF, (COSOPT PF) 2-0.5 % Dpet ophthalmic solution No dose, route, or frequency recorded.    fish oil-omega-3 fatty acids 300-1,000 mg capsule 1 capsule, Oral, Daily    furosemide (LASIX) 40 MG tablet TAKE 1 TABLET BY MOUTH DAILY AS NEEDED FOR FLUID RETENSION    latanoprost 0.005 % ophthalmic solution 1 drop, Both Eyes, Nightly    lisinopriL (PRINIVIL,ZESTRIL) 40 MG tablet TAKE 1 TABLET BY MOUTH EVERY DAY    magnesium oxide (MAG-OX) 400 mg, Oral, Daily    montelukast (SINGULAIR) 10 mg tablet TAKE 1 TABLET BY MOUTH EVERY DAY IN THE EVENING    tadalafiL (CIALIS) 5 mg, Oral, Daily    tamsulosin (FLOMAX) 0.4 mg    terbinafine HCL (LAMISIL) 250 mg tablet TAKE 1 TABLET BY MOUTH AS DIRECTED TAKE CONSECUTIVE 10 DAYS OF A MONTH X 3 MONTHS    testosterone cypionate (DEPOTESTOTERONE CYPIONATE) 100 mg/mL injection Intramuscular    zinc gluconate 50 mg, Oral      I have reviewed the PMH, social history, FamilyHx, surgical history, allergies and medications documented / confirmed by the patient at the time of this visit.  Review of Systems   Constitutional: Positive  "for fatigue. Negative for chills, fever and unexpected weight change.   HENT: Negative for ear pain and sore throat.    Eyes: Negative for redness and visual disturbance.   Respiratory: Negative for cough and shortness of breath.    Cardiovascular: Negative for chest pain and palpitations.   Gastrointestinal: Negative for nausea and vomiting.   Endocrine: Negative for cold intolerance and heat intolerance.   Genitourinary: Negative for difficulty urinating and hematuria.   Musculoskeletal: Positive for arthralgias and myalgias.   Skin: Negative for rash and wound.   Allergic/Immunologic: Negative for environmental allergies and food allergies.   Neurological: Negative for weakness and headaches.   Hematological: Negative for adenopathy. Does not bruise/bleed easily.   Psychiatric/Behavioral: Positive for sleep disturbance. The patient is nervous/anxious.      Objective:   /80   Pulse 80   Temp 97.7 °F (36.5 °C) (Other (see comments))   Ht 5' 11" (1.803 m)   Wt 98.9 kg (218 lb)   SpO2 97%   BMI 30.40 kg/m²   Physical Exam  Vitals and nursing note reviewed.   Constitutional:       General: He is not in acute distress.     Appearance: He is well-developed.   HENT:      Head: Normocephalic and atraumatic.   Eyes:      Pupils: Pupils are equal, round, and reactive to light.   Cardiovascular:      Rate and Rhythm: Normal rate.      Pulses: Normal pulses.      Heart sounds: Normal heart sounds.   Pulmonary:      Effort: Pulmonary effort is normal. No respiratory distress.      Breath sounds: Normal breath sounds. No wheezing.   Musculoskeletal:         General: Normal range of motion.      Cervical back: Normal range of motion and neck supple.   Skin:     General: Skin is warm and dry.      Capillary Refill: Capillary refill takes less than 2 seconds.   Neurological:      Mental Status: He is alert and oriented to person, place, and time.      Cranial Nerves: No cranial nerve deficit.   Psychiatric:         " Mood and Affect: Mood is anxious.         Behavior: Behavior normal.        Patient is wearing a mask which limits physical exam due to COVID restrictions.   Assessment:     1. Essential hypertension    2. Encounter for long-term (current) use of medications    3. Anxiety    4. Need for hepatitis C screening test    5. Primary hypertension      MDM:   Moderate complexity.  Moderate risk.  Total time: 31 minutes.  This includes total time spent on the encounter, which includes face to face time and non-face to face time preparing to see the patient (eg, review of previous medical records, tests), Obtaining and/or reviewing separately obtained history, documenting clinical information in the electronic or other health record, independently interpreting results (not separately reported)/communicating results to the patient/family/caregiver, and/or care coordination (not separately reported).    I have Reviewed and summarized old records.  I have performed thorough medication reconciliation today and discussed risk and benefits of medications.  I have reviewed labs and discussed with patient.  All questions were answered.  I am requesting old records and will review them once they are available.    I have signed for the following orders AND/OR meds.  Orders Placed This Encounter   Procedures    Hepatitis C Antibody     Standing Status:   Future     Number of Occurrences:   1     Standing Expiration Date:   7/26/2023     Order Specific Question:   Release to patient     Answer:   Immediate     Medications Ordered This Encounter   Medications    ALPRAZolam (XANAX) 1 MG tablet     Sig: Take 1 tablet (1 mg total) by mouth 3 (three) times daily.     Dispense:  90 tablet     Refill:  5        Follow up in about 6 months (around 11/27/2022), or if symptoms worsen or fail to improve, for Annual Wellness Exam.    If no improvement in symptoms or symptoms worsen, advised to call/follow-up at clinic or go to ER. Patient  voiced understanding and all questions/concerns were addressed.   DISCLAIMER: This note was compiled by using a speech recognition dictation system and therefore please be aware that typographical / speech recognition errors can and do occur.  Please contact me if you see any errors specifically.    Pedro Pablo Dc M.D.       Office: [phone]   [address] [place] [address]  FAX: [phone]

## 2022-05-27 NOTE — ASSESSMENT & PLAN NOTE
Continue lisinopril amlodipine.Counseled on importance of hypertension disease course, I recommend ongoing Education for DASH-diet and exercise.  Counseled on medication regimen importance of treating high blood pressure.  Please be advised of risk of untreated blood pressure as discussed.  Please advised of ER precautions were given for symptoms of hypertensive urgency and emergency.

## 2022-05-29 NOTE — PROGRESS NOTES
Make follow-up lab appointment per recommendation below.  Check to see if patient has seen the results through my chart.  If not then,  #CALL THE PATIENT# to discuss results/see if they have questions and document verification of contact. Make F/U appt if needed. [phone]    #My interpretation that was sent to them through [facility]:  Naveed, I have reviewed your recent blood work.     PSA screening for prostate cancer is within normal limits.  Repeat annually.  Your metabolic panel which shows your glucose, kidney function, electrolytes, and liver function is normal.   Your cholesterol is normal.    Your hemoglobin A1c is normal.  This test is gold standard screening test for diabetes.  It is a measures 3 months of your average blood sugar.    =========================  Also please address any outstanding health maintenance that may be due: TETANUS VACCINE Never done  Shingles Vaccine(1 of 2) Never done  Pneumococcal Vaccines (Age 65+)(1 - PCV) Never done  COVID-19 Vaccine(4 - Booster for Pfizer series) due on 11/21/2021

## 2022-05-31 ENCOUNTER — PATIENT MESSAGE (OUTPATIENT)
Dept: FAMILY MEDICINE | Facility: CLINIC | Age: 72
End: 2022-05-31
Payer: MEDICARE

## 2022-05-31 LAB — HCV AB SERPL QL IA: NEGATIVE

## 2022-06-01 NOTE — PROGRESS NOTES
1st check to see if patient has seen the results.  If not then  CALL patient with results and Document verification.  Schedule follow-up if needed.  [phone]  Hepatitis-C screening is negative.

## 2022-08-10 DIAGNOSIS — F41.9 ANXIETY: Chronic | ICD-10-CM

## 2022-08-10 DIAGNOSIS — Z79.899 ENCOUNTER FOR LONG-TERM (CURRENT) USE OF MEDICATIONS: ICD-10-CM

## 2022-08-10 NOTE — TELEPHONE ENCOUNTER
----- Message from Adina Tapia sent at 8/10/2022  1:23 PM CDT -----  Type:  RX Refill Request    Who Called: patient  Refill or New Rx:refill  RX Name and Strength: Alprazolam 1mg  How is the patient currently taking it? (ex. 1XDay):3xday  Is this a 30 day or 90 day RX:na  Preferred Pharmacy with phone number:[facility]  Local or Mail Order:local  Ordering Provider:Dr Dc  Would the patient rather a call back or a response via [facility]? Call back  Best Call Back Number:[phone]  Additional Information: na

## 2022-08-10 NOTE — TELEPHONE ENCOUNTER
No new care gaps identified.  [facility] Embedded Care Gaps. Reference number: [id number]. 8/10/2022   1:27:20 PM CDT

## 2022-08-11 RX ORDER — ALPRAZOLAM 1 MG/1
1 TABLET ORAL 3 TIMES DAILY
Qty: 90 TABLET | Refills: 5 | Status: SHIPPED | OUTPATIENT
Start: 2022-08-11 | End: 2023-02-13

## 2022-08-31 ENCOUNTER — TELEPHONE (OUTPATIENT)
Dept: FAMILY MEDICINE | Facility: CLINIC | Age: 72
End: 2022-08-31
Payer: MEDICARE

## 2022-08-31 ENCOUNTER — LAB VISIT (OUTPATIENT)
Dept: LAB | Facility: HOSPITAL | Age: 72
End: 2022-08-31
Attending: FAMILY MEDICINE
Payer: MEDICARE

## 2022-08-31 DIAGNOSIS — N41.9 PROSTATITIS, UNSPECIFIED PROSTATITIS TYPE: Primary | ICD-10-CM

## 2022-08-31 DIAGNOSIS — N41.9 PROSTATITIS, UNSPECIFIED PROSTATITIS TYPE: ICD-10-CM

## 2022-08-31 LAB
BILIRUB UR QL STRIP: NEGATIVE
CLARITY UR: CLEAR
COLOR UR: YELLOW
GLUCOSE UR QL STRIP: NEGATIVE
HGB UR QL STRIP: NEGATIVE
KETONES UR QL STRIP: NEGATIVE
LEUKOCYTE ESTERASE UR QL STRIP: NEGATIVE
NITRITE UR QL STRIP: NEGATIVE
PH UR STRIP: 6 [PH] (ref 5–8)
PROT UR QL STRIP: NEGATIVE
SP GR UR STRIP: 1.01 (ref 1–1.03)
URN SPEC COLLECT METH UR: NORMAL

## 2022-08-31 PROCEDURE — 81003 URINALYSIS AUTO W/O SCOPE: CPT | Mod: PO | Performed by: FAMILY MEDICINE

## 2022-08-31 RX ORDER — CIPROFLOXACIN 500 MG/1
500 TABLET ORAL 2 TIMES DAILY
Qty: 14 TABLET | Refills: 0 | Status: SHIPPED | OUTPATIENT
Start: 2022-08-31 | End: 2022-09-07

## 2022-08-31 NOTE — TELEPHONE ENCOUNTER
----- Message from Nathalie Buck sent at 8/31/2022 10:31 AM CDT -----  Contact: Patient, [phone]  Calling to speak with the nurse regarding low back pain, possible prostatitis. May need antibiotics. Please call him. Thanks.

## 2022-08-31 NOTE — TELEPHONE ENCOUNTER
Spoke to pt. Advised rx was sent to the pharmacy and orders placed for UA. Pt. Verbalized understanding phone call ended.

## 2022-08-31 NOTE — TELEPHONE ENCOUNTER
"Spoke to pt. States "im having back pain and frequent urination it is prostatitis and oliverio had an antibiotic for this before." Advised pt. On appointment or reaching out to urologist. Pt. Denied requesting message be sent to  to send in abx. Phone call ended. Please advise.   "

## 2022-08-31 NOTE — TELEPHONE ENCOUNTER
I have signed for the following orders AND/OR meds.  Please call the patient and ask the patient to schedule the testing AND/OR inform about any medications that were sent.      Orders Placed This Encounter   Procedures    Urinalysis, Reflex to Urine Culture Urine, Clean Catch     Standing Status:   Future     Standing Expiration Date:   2/29/2024     Order Specific Question:   Preferred Collection Type     Answer:   Urine, Clean Catch     Order Specific Question:   Specimen Source     Answer:   Urine       Medications Ordered This Encounter   Medications    ciprofloxacin HCl (CIPRO) 500 MG tablet     Sig: Take 1 tablet (500 mg total) by mouth 2 (two) times daily. for 7 days     Dispense:  14 tablet     Refill:  0

## 2022-09-01 NOTE — PROGRESS NOTES
Please call the patient with the results. [phone]  Your urinalysis is normal.      If you have any other tests that were ordered we will notify you once they are available.  Please let me know if you have any questions concerning this test.  We will discuss further at your next office appointment.    Also please see below for health maintenance items that are due so we may discuss those at your next office visit:    TETANUS VACCINE Never done  Shingles Vaccine(1 of 2) Never done  Pneumococcal Vaccines (Age 65+)(1 - PCV) Never done  COVID-19 Vaccine(4 - Booster for Pfizer series) due on 11/13/2021  Influenza Vaccine(1) due on 09/01/2022

## 2022-10-12 ENCOUNTER — TELEPHONE (OUTPATIENT)
Dept: FAMILY MEDICINE | Facility: CLINIC | Age: 72
End: 2022-10-12
Payer: MEDICARE

## 2022-10-12 ENCOUNTER — LAB VISIT (OUTPATIENT)
Dept: LAB | Facility: HOSPITAL | Age: 72
End: 2022-10-12
Attending: UROLOGY
Payer: MEDICARE

## 2022-10-12 DIAGNOSIS — R79.89 HYPOURICEMIA: ICD-10-CM

## 2022-10-12 DIAGNOSIS — N40.1 ENLARGED PROSTATE WITH URINARY OBSTRUCTION: ICD-10-CM

## 2022-10-12 DIAGNOSIS — N13.8 ENLARGED PROSTATE WITH URINARY OBSTRUCTION: ICD-10-CM

## 2022-10-12 DIAGNOSIS — R97.20 ELEVATED PROSTATE SPECIFIC ANTIGEN (PSA): Primary | ICD-10-CM

## 2022-10-12 DIAGNOSIS — Z12.5 SPECIAL SCREENING FOR MALIGNANT NEOPLASM OF PROSTATE: ICD-10-CM

## 2022-10-12 PROCEDURE — 84153 ASSAY OF PSA TOTAL: CPT | Performed by: UROLOGY

## 2022-10-12 PROCEDURE — 85025 COMPLETE CBC W/AUTO DIFF WBC: CPT | Performed by: UROLOGY

## 2022-10-12 PROCEDURE — 84402 ASSAY OF FREE TESTOSTERONE: CPT | Performed by: UROLOGY

## 2022-10-12 PROCEDURE — 84403 ASSAY OF TOTAL TESTOSTERONE: CPT | Performed by: UROLOGY

## 2022-10-12 PROCEDURE — 36415 COLL VENOUS BLD VENIPUNCTURE: CPT | Mod: PO | Performed by: UROLOGY

## 2022-10-12 NOTE — TELEPHONE ENCOUNTER
----- Message from Erica Miller sent at 10/12/2022 12:33 PM CDT -----  Contact: Andrés Bowen is calling with questions for lab orders to be put in from a outside doctor. Please give patient a call back     Thanks  LJ

## 2022-10-13 LAB
BASOPHILS # BLD AUTO: 0.03 K/UL (ref 0–0.2)
BASOPHILS NFR BLD: 0.6 % (ref 0–1.9)
COMPLEXED PSA SERPL-MCNC: 2.2 NG/ML (ref 0–4)
DIFFERENTIAL METHOD: ABNORMAL
EOSINOPHIL # BLD AUTO: 0.1 K/UL (ref 0–0.5)
EOSINOPHIL NFR BLD: 2.1 % (ref 0–8)
ERYTHROCYTE [DISTWIDTH] IN BLOOD BY AUTOMATED COUNT: 12.4 % (ref 11.5–14.5)
HCT VFR BLD AUTO: 48.6 % (ref 40–54)
HGB BLD-MCNC: 15.7 G/DL (ref 14–18)
IMM GRANULOCYTES # BLD AUTO: 0.01 K/UL (ref 0–0.04)
IMM GRANULOCYTES NFR BLD AUTO: 0.2 % (ref 0–0.5)
LYMPHOCYTES # BLD AUTO: 1.7 K/UL (ref 1–4.8)
LYMPHOCYTES NFR BLD: 33.5 % (ref 18–48)
MCH RBC QN AUTO: 31.5 PG (ref 27–31)
MCHC RBC AUTO-ENTMCNC: 32.3 G/DL (ref 32–36)
MCV RBC AUTO: 98 FL (ref 82–98)
MONOCYTES # BLD AUTO: 0.8 K/UL (ref 0.3–1)
MONOCYTES NFR BLD: 15.1 % (ref 4–15)
NEUTROPHILS # BLD AUTO: 2.5 K/UL (ref 1.8–7.7)
NEUTROPHILS NFR BLD: 48.5 % (ref 38–73)
NRBC BLD-RTO: 0 /100 WBC
PLATELET # BLD AUTO: 254 K/UL (ref 150–450)
PMV BLD AUTO: 11 FL (ref 9.2–12.9)
RBC # BLD AUTO: 4.98 M/UL (ref 4.6–6.2)
TESTOST SERPL-MCNC: 838 NG/DL (ref 304–1227)
WBC # BLD AUTO: 5.17 K/UL (ref 3.9–12.7)

## 2022-10-18 LAB — TESTOST FREE SERPL-MCNC: 10.8 PG/ML

## 2022-10-20 NOTE — TELEPHONE ENCOUNTER
Left detailed message on recorder for patient to call and schedule appt.   Alert-The patient is alert, awake and responds to voice. The patient is oriented to time, place, and person. The triage nurse is able to obtain subjective information.

## 2022-10-24 ENCOUNTER — OFFICE VISIT (OUTPATIENT)
Dept: FAMILY MEDICINE | Facility: CLINIC | Age: 72
End: 2022-10-24
Payer: MEDICARE

## 2022-10-24 VITALS
WEIGHT: 216.06 LBS | BODY MASS INDEX: 30.25 KG/M2 | OXYGEN SATURATION: 96 % | DIASTOLIC BLOOD PRESSURE: 68 MMHG | HEART RATE: 85 BPM | SYSTOLIC BLOOD PRESSURE: 118 MMHG | TEMPERATURE: 99 F | HEIGHT: 71 IN

## 2022-10-24 DIAGNOSIS — F41.9 ANXIETY: Chronic | ICD-10-CM

## 2022-10-24 DIAGNOSIS — Z79.899 ENCOUNTER FOR LONG-TERM (CURRENT) USE OF MEDICATIONS: ICD-10-CM

## 2022-10-24 DIAGNOSIS — Z00.00 WELL ADULT EXAM: Primary | ICD-10-CM

## 2022-10-24 DIAGNOSIS — I10 ESSENTIAL HYPERTENSION: ICD-10-CM

## 2022-10-24 DIAGNOSIS — Z23 NEED FOR PNEUMOCOCCAL VACCINE: ICD-10-CM

## 2022-10-24 DIAGNOSIS — Z13.6 ENCOUNTER FOR LIPID SCREENING FOR CARDIOVASCULAR DISEASE: ICD-10-CM

## 2022-10-24 DIAGNOSIS — Z13.220 ENCOUNTER FOR LIPID SCREENING FOR CARDIOVASCULAR DISEASE: ICD-10-CM

## 2022-10-24 PROBLEM — R97.20 ELEVATED PROSTATE SPECIFIC ANTIGEN (PSA): Status: ACTIVE | Noted: 2022-10-10

## 2022-10-24 PROBLEM — R79.89 LOW TESTOSTERONE: Status: ACTIVE | Noted: 2022-10-10

## 2022-10-24 PROCEDURE — G0009 ADMIN PNEUMOCOCCAL VACCINE: HCPCS | Mod: S$GLB,,, | Performed by: FAMILY MEDICINE

## 2022-10-24 PROCEDURE — 1126F AMNT PAIN NOTED NONE PRSNT: CPT | Mod: CPTII,S$GLB,, | Performed by: FAMILY MEDICINE

## 2022-10-24 PROCEDURE — 3078F PR MOST RECENT DIASTOLIC BLOOD PRESSURE < 80 MM HG: ICD-10-PCS | Mod: CPTII,S$GLB,, | Performed by: FAMILY MEDICINE

## 2022-10-24 PROCEDURE — 90677 PNEUMOCOCCAL CONJUGATE VACCINE 20-VALENT: ICD-10-PCS | Mod: S$GLB,,, | Performed by: FAMILY MEDICINE

## 2022-10-24 PROCEDURE — 3074F SYST BP LT 130 MM HG: CPT | Mod: CPTII,S$GLB,, | Performed by: FAMILY MEDICINE

## 2022-10-24 PROCEDURE — 3078F DIAST BP <80 MM HG: CPT | Mod: CPTII,S$GLB,, | Performed by: FAMILY MEDICINE

## 2022-10-24 PROCEDURE — 3044F PR MOST RECENT HEMOGLOBIN A1C LEVEL <7.0%: ICD-10-PCS | Mod: CPTII,S$GLB,, | Performed by: FAMILY MEDICINE

## 2022-10-24 PROCEDURE — 99999 PR PBB SHADOW E&M-EST. PATIENT-LVL V: ICD-10-PCS | Mod: PBBFAC,,, | Performed by: FAMILY MEDICINE

## 2022-10-24 PROCEDURE — 1126F PR PAIN SEVERITY QUANTIFIED, NO PAIN PRESENT: ICD-10-PCS | Mod: CPTII,S$GLB,, | Performed by: FAMILY MEDICINE

## 2022-10-24 PROCEDURE — 1101F PR PT FALLS ASSESS DOC 0-1 FALLS W/OUT INJ PAST YR: ICD-10-PCS | Mod: CPTII,S$GLB,, | Performed by: FAMILY MEDICINE

## 2022-10-24 PROCEDURE — 99397 PR PREVENTIVE VISIT,EST,65 & OVER: ICD-10-PCS | Mod: S$GLB,,, | Performed by: FAMILY MEDICINE

## 2022-10-24 PROCEDURE — 90677 PCV20 VACCINE IM: CPT | Mod: S$GLB,,, | Performed by: FAMILY MEDICINE

## 2022-10-24 PROCEDURE — G0009 PNEUMOCOCCAL CONJUGATE VACCINE 20-VALENT: ICD-10-PCS | Mod: S$GLB,,, | Performed by: FAMILY MEDICINE

## 2022-10-24 PROCEDURE — 99999 PR PBB SHADOW E&M-EST. PATIENT-LVL V: CPT | Mod: PBBFAC,,, | Performed by: FAMILY MEDICINE

## 2022-10-24 PROCEDURE — 4010F ACE/ARB THERAPY RXD/TAKEN: CPT | Mod: CPTII,S$GLB,, | Performed by: FAMILY MEDICINE

## 2022-10-24 PROCEDURE — 1159F MED LIST DOCD IN RCRD: CPT | Mod: CPTII,S$GLB,, | Performed by: FAMILY MEDICINE

## 2022-10-24 PROCEDURE — 4010F PR ACE/ARB THEARPY RXD/TAKEN: ICD-10-PCS | Mod: CPTII,S$GLB,, | Performed by: FAMILY MEDICINE

## 2022-10-24 PROCEDURE — 99397 PER PM REEVAL EST PAT 65+ YR: CPT | Mod: S$GLB,,, | Performed by: FAMILY MEDICINE

## 2022-10-24 PROCEDURE — 1159F PR MEDICATION LIST DOCUMENTED IN MEDICAL RECORD: ICD-10-PCS | Mod: CPTII,S$GLB,, | Performed by: FAMILY MEDICINE

## 2022-10-24 PROCEDURE — 3288F PR FALLS RISK ASSESSMENT DOCUMENTED: ICD-10-PCS | Mod: CPTII,S$GLB,, | Performed by: FAMILY MEDICINE

## 2022-10-24 PROCEDURE — 1101F PT FALLS ASSESS-DOCD LE1/YR: CPT | Mod: CPTII,S$GLB,, | Performed by: FAMILY MEDICINE

## 2022-10-24 PROCEDURE — 3288F FALL RISK ASSESSMENT DOCD: CPT | Mod: CPTII,S$GLB,, | Performed by: FAMILY MEDICINE

## 2022-10-24 PROCEDURE — 3044F HG A1C LEVEL LT 7.0%: CPT | Mod: CPTII,S$GLB,, | Performed by: FAMILY MEDICINE

## 2022-10-24 PROCEDURE — 3074F PR MOST RECENT SYSTOLIC BLOOD PRESSURE < 130 MM HG: ICD-10-PCS | Mod: CPTII,S$GLB,, | Performed by: FAMILY MEDICINE

## 2022-10-24 RX ORDER — ALFUZOSIN HYDROCHLORIDE 10 MG/1
TABLET, EXTENDED RELEASE ORAL
COMMUNITY
Start: 2022-10-10

## 2022-10-24 RX ORDER — BUPROPION HYDROCHLORIDE 150 MG/1
150 TABLET ORAL DAILY
Qty: 30 TABLET | Refills: 11 | Status: SHIPPED | OUTPATIENT
Start: 2022-10-24 | End: 2022-11-11

## 2022-10-24 NOTE — PROGRESS NOTES
This note is specifically for wellness visit performed today.   WELLNESS EXAM    Patient ID: Andrés Casillas is a 72 y.o. male.  has a past medical history of Anxiety, BPH (benign prostatic hypertrophy), DDD (degenerative disc disease), lumbar, Hypertension, and GAURANG (obstructive sleep apnea).   Chief Complaint:  Encounter for wellness exam    Well Adult Physical: Patient here for a comprehensive physical exam.The patient reports chronic problems.    The patient's last visit with me was on 5/27/2022.    Reviewed Care team: Previous PCP: Dr. Cabrera Alan  Urology: Dr. Omar Soto; Jose Maria Rees Stephen, MD  [facility] - Dr. Green   GI: Dr. Rich Richards  ortho: Dr. Tyshawn Fang  Sleep Medicine: Dr. Santacruz  ENT: Dr. Allen  Cardiology Dr. Lock    October 2022: Patient reports that he has been having some issues with his anxiety and currently having some depression not wanting to do a lot.  He has started exercising more often and feels much better.  Denies any SI HI or hallucinations.  He is not opposed to daily medication however he just does not want to take it forever.    He was recently treated for prostatitis.  Patient also establish care with a new urologist here in [place].  Component Ref Range & Units 12 d ago 8 mo ago 11 mo ago   PSA Diagnostic 0.00 - 4.00 ng/mL 2.2  3.6 CM  4.5 High  CM    Comment: The testing method is a chemiluminescent microparticle immunoassay   manufactured by [employer] and performed on the [employer]   or   [employer] system. Values obtained with different assay manufacturers   for   methods may be different and cannot be used interchangeably.   PSA Expected levels:   Hormonal Therapy: <0.05 ng/ml   Prostatectomy: <0.01 ng/ml   Radiation Therapy: <1.00 ng/ml    Resulting Agency  [facility]              Specimen Collected: 10/12/22 13:20             Hypertension:CHRONIC. STABLE. BP Reviewed.  Compliant with BP medications. No SE reported.    (-) CP, SOB, palpitations, dizziness, lightheadedness, HA, arm numbness, tingling or weakness, syncope.  Creatinine   Date Value Ref Range Status   05/27/2022 0.9 0.5 - 1.4 mg/dL Final           Do you take any herbs or supplements that were not prescribed by a doctor?  Yes  Are you taking calcium supplements? no     Date last PSA: The natural history of prostate cancer and ongoing controversy regarding screening and potential treatment outcomes of prostate cancer has been discussed with the patient. The meaning of a false positive PSA and a false negative PSA has been discussed. He indicates understanding of the limitations of this screening test and wishes  to proceed with screening PSA testing.  Lab Results   Component Value Date    PSA 3.4 05/27/2022    PSA 5.6 (H) 10/22/2021    PSA 2.9 10/16/2020      Lab Results   Component Value Date    TESTOSTERONE 10.8 10/12/2022      Testosterone, Free (pg/mL)   Date Value   10/12/2022 10.8     Testosterone, Total (ng/dL)   Date Value   10/12/2022 838     Testosterone, Bioavailable (ng/dL)   Date Value   10/16/2020 189.1      Colon cancer screening:COLONOSCOPY,DIAGNOSTIC Performed on Wednesday January 22, 2020     Provider Linked Diagnosis   [place] COLONOSCOPY,DIAGNOSTIC performed at [facility]   [facility] received Tuesday May 11, 2021    [facility], [facility].    Personal history of colonic polyps,   Unspecified asthma, uncomplicated,   Residual hemorrhoidal skin tags,   Essential (primary) hypertension,   Diverticulosis of large intestine without perforation or abscess without bleeding,   Personal history of nicotine dependence          Health Maintenance Topics with due status: Not Due       Topic Last Completion Date    Lipid Panel 05/27/2022    PROSTATE-SPECIFIC ANTIGEN 10/12/2022      ==============================================  History reviewed.   Health Maintenance Due   Topic Date Due    Colorectal  Cancer Screening  01/22/2023   Patient is due for pneumonia vaccine.  He agrees to risk and benefits.    Past Medical History:  Past Medical History:   Diagnosis Date    Anxiety     BPH (benign prostatic hypertrophy)     DDD (degenerative disc disease), lumbar     Hypertension     GAURANG (obstructive sleep apnea)      Past Surgical History:   Procedure Laterality Date    JOINT REPLACEMENT       Review of patient's allergies indicates:   Allergen Reactions    No known drug allergies      Current Outpatient Medications on File Prior to Visit   Medication Sig Dispense Refill    albuterol (PROVENTIL/VENTOLIN HFA) 90 mcg/actuation inhaler INHALE 2 PUFFS BY MOUTH EVERY 6 HOURS AS NEEDED FOR WHEEZING 54 g 3    ALPRAZolam (XANAX) 1 MG tablet Take 1 tablet (1 mg total) by mouth 3 (three) times daily. 90 tablet 5    amLODIPine (NORVASC) 10 MG tablet TAKE 1 TABLET BY MOUTH EVERY DAY 90 tablet 3    co-enzyme Q-10 30 mg capsule Take 30 mg by mouth once daily.       dorzolamide-timolol, PF, (COSOPT PF) 2-0.5 % Dpet ophthalmic solution       fish oil-omega-3 fatty acids 300-1,000 mg capsule Take 1 capsule by mouth once daily.       latanoprost 0.005 % ophthalmic solution Place 1 drop into both eyes nightly.      lisinopriL (PRINIVIL,ZESTRIL) 40 MG tablet TAKE 1 TABLET BY MOUTH EVERY DAY 90 tablet 3    magnesium oxide (MAG-OX) 400 mg (241.3 mg magnesium) tablet Take 400 mg by mouth once daily.       tadalafiL (CIALIS) 5 MG tablet Take 5 mg by mouth once daily.      tamsulosin (FLOMAX) 0.4 mg Cp24 0.4 mg.       terbinafine HCL (LAMISIL) 250 mg tablet TAKE 1 TABLET BY MOUTH AS DIRECTED TAKE CONSECUTIVE 10 DAYS OF A MONTH X 3 MONTHS      testosterone cypionate (DEPOTESTOTERONE CYPIONATE) 100 mg/mL injection Inject into the muscle.      zinc gluconate 50 mg tablet Take 50 mg by mouth.      alfuzosin (UROXATRAL) 10 mg Tb24       DIINDOLYLMETHANE, BULK, MISC 100 mg by Other route.      furosemide (LASIX) 40 MG tablet TAKE 1 TABLET BY MOUTH  DAILY AS NEEDED FOR FLUID RETENSION (Patient not taking: Reported on 10/24/2022) 90 tablet 4    montelukast (SINGULAIR) 10 mg tablet TAKE 1 TABLET BY MOUTH EVERY DAY IN THE EVENING (Patient not taking: Reported on 10/24/2022) 90 tablet 2     No current facility-administered medications on file prior to visit.     Social History     Socioeconomic History    Marital status: Single   Occupational History     Employer: [employer]   Tobacco Use    Smoking status: Never    Smokeless tobacco: Never   Substance and Sexual Activity    Alcohol use: Yes     Alcohol/week: 1.7 standard drinks     Types: 2 Standard drinks or equivalent per week     Comment: socially    Drug use: No    Sexual activity: Yes     Partners: Female   Social History Narrative    ** Merged History Encounter **          Family History   Problem Relation Age of Onset    Heart disease Mother     Cancer Mother     Heart disease Father     Prostate cancer Brother        Review of Systems   Constitutional:  Negative for chills, fatigue, fever and unexpected weight change.   HENT:  Negative for ear pain and sore throat.    Eyes:  Negative for redness and visual disturbance.   Respiratory:  Negative for cough and shortness of breath.    Cardiovascular:  Negative for chest pain and palpitations.   Gastrointestinal:  Negative for nausea and vomiting.   Endocrine: Negative for cold intolerance and heat intolerance.   Genitourinary:  Negative for difficulty urinating and hematuria.   Musculoskeletal:  Positive for arthralgias and myalgias.   Skin:  Negative for rash and wound.   Allergic/Immunologic: Negative for environmental allergies and food allergies.   Neurological:  Negative for weakness and headaches.   Hematological:  Negative for adenopathy. Does not bruise/bleed easily.   Psychiatric/Behavioral:  Positive for sleep disturbance. The patient is nervous/anxious.       Objective:     Vitals:    10/24/22 1329   BP: 118/68   Pulse: 85   Temp: 98.5 °F (36.9  °C)    Body mass index is 30.13 kg/m².  Physical Exam  Vitals and nursing note reviewed.   Constitutional:       General: He is not in acute distress.     Appearance: He is well-developed.   HENT:      Head: Normocephalic and atraumatic.   Eyes:      Pupils: Pupils are equal, round, and reactive to light.   Cardiovascular:      Rate and Rhythm: Normal rate.      Pulses: Normal pulses.      Heart sounds: Normal heart sounds.   Pulmonary:      Effort: Pulmonary effort is normal. No respiratory distress.      Breath sounds: Normal breath sounds. No wheezing.   Musculoskeletal:         General: Normal range of motion.      Cervical back: Normal range of motion and neck supple.   Skin:     General: Skin is warm and dry.      Capillary Refill: Capillary refill takes less than 2 seconds.   Neurological:      Mental Status: He is alert and oriented to person, place, and time.      Cranial Nerves: No cranial nerve deficit.   Psychiatric:         Mood and Affect: Mood is anxious.         Behavior: Behavior normal.        Lab Visit on 10/12/2022   Component Date Value Ref Range Status    PSA Diagnostic 10/12/2022 2.2  0.00 - 4.00 ng/mL Final    Comment: The testing method is a chemiluminescent microparticle immunoassay   manufactured by [employer] Inc and performed on the    or   [employer] system. Values obtained with different assay manufacturers   for   methods may be different and cannot be used interchangeably.  PSA Expected levels:  Hormonal Therapy: <0.05 ng/ml  Prostatectomy: <0.01 ng/ml  Radiation Therapy: <1.00 ng/ml      WBC 10/12/2022 5.17  3.90 - 12.70 K/uL Final    RBC 10/12/2022 4.98  4.60 - 6.20 M/uL Final    Hemoglobin 10/12/2022 15.7  14.0 - 18.0 g/dL Final    Hematocrit 10/12/2022 48.6  40.0 - 54.0 % Final    MCV 10/12/2022 98  82 - 98 fL Final    MCH 10/12/2022 31.5 (H)  27.0 - 31.0 pg Final    MCHC 10/12/2022 32.3  32.0 - 36.0 g/dL Final    RDW 10/12/2022 12.4  11.5 - 14.5 % Final     Platelets 10/12/2022 254  150 - 450 K/uL Final    MPV 10/12/2022 11.0  9.2 - 12.9 fL Final    Immature Granulocytes 10/12/2022 0.2  0.0 - 0.5 % Final    Gran # (ANC) 10/12/2022 2.5  1.8 - 7.7 K/uL Final    Immature Grans (Abs) 10/12/2022 0.01  0.00 - 0.04 K/uL Final    Comment: Mild elevation in immature granulocytes is non specific and   can be seen in a variety of conditions including stress response,   acute inflammation, trauma and pregnancy. Correlation with other   laboratory and clinical findings is essential.      Lymph # 10/12/2022 1.7  1.0 - 4.8 K/uL Final    Mono # 10/12/2022 0.8  0.3 - 1.0 K/uL Final    Eos # 10/12/2022 0.1  0.0 - 0.5 K/uL Final    Baso # 10/12/2022 0.03  0.00 - 0.20 K/uL Final    nRBC 10/12/2022 0  0 /100 WBC Final    Gran % 10/12/2022 48.5  38.0 - 73.0 % Final    Lymph % 10/12/2022 33.5  18.0 - 48.0 % Final    Mono % 10/12/2022 15.1 (H)  4.0 - 15.0 % Final    Eosinophil % 10/12/2022 2.1  0.0 - 8.0 % Final    Basophil % 10/12/2022 0.6  0.0 - 1.9 % Final    Differential Method 10/12/2022 Automated   Final    Testosterone, Free 10/12/2022 10.8  pg/mL Final    Comment: No reference range available for males under 20 years or over 50   years.    Test performed at [facility],  [address], [place]  [address] [phone]  Merline Lopez MD, PhD - Medical Director      Testosterone, Total 10/12/2022 838  304 - 1227 ng/dL Final        Assessment / Plan:    1.  Routine health exam-patient here for annual wellness exam.  Labs ordered.  Health maintenance was reviewed and ordered.  Anticipatory guidance: Don't smoke.  Healthy diet and regular exercise recommended. Vaccine recommendations discussed.  See orders.  Reviewed Anticipatory guidance, risk factor reduction interventions or counseling as needed, Complete history , physical was completed today.  Complete and thorough medication reconciliation was performed.  Discussed risks and benefits of medications.  Advised  patient on orders and health maintenance.  We discussed old records and old labs if available.  Will request any records not available through epic.  Continue current medications listed on your summary sheet.  All questions were answered. Patient had no further concerns. Advised of diagnoses and plan. Follow up as planned or return sooner if symptoms persist or worsen.   Anxiety and depression:  Start trial of Wellbutrin.  Patient will message me if having any issues.  Discussed common side effects.  Discussed risk and benefits.Please be advised of condition course.  SNRI/SSRI is first-line treatment for this condition.  Please be advised of the risk of discontinuing this medication without tapering/contacting MD.  Patient has been advised of side effects, and all questions were answered.  Patient voiced understanding.  Patient will follow up routinely and notify us if having any side effects or worsening or persistent symptoms.  ER precautions were given. Antidepressant/Antianxiety Medication Initiation:  Patient informed of risks, benefits, and potential side effects of medication and accepts informed consent.  Common side effects include nausea, fatigue, headache, insomnia, etc see medication insert for complete side effect profile.  Most importantly be advised of the possibility of new or worsening suicidal thoughts/depression/anxiety etcetera.  Please be advised to stop the medication immediately and seek urgent treatment if this occurs.  Therefore please do not to abruptly discontinue medication without physician guidance except in cases of sudden onset or worsening of SI.       Orders Placed This Encounter   Procedures    (In Office Administered) Pneumococcal Conjugate Vaccine (20 Valent) (IM)         Medications Ordered This Encounter   Medications    buPROPion (WELLBUTRIN XL) 150 MG TB24 tablet     Sig: Take 1 tablet (150 mg total) by mouth once daily.     Dispense:  30 tablet     Refill:  11        Future  Appointments       Date Provider Specialty Appt Notes    11/30/2022 Pedro Pablo Dc MD Family Medicine 6 month f/u           Pedro Pablo Dc MD

## 2022-10-24 NOTE — PATIENT INSTRUCTIONS
Follow up in about 6 months (around 4/24/2023), or if symptoms worsen or fail to improve, for Med refills.         Dear patient,   As a result of recent federal legislation (The Federal Cures Act), you may receive lab or pathology results from your visit in your [facility] account before your physician is able to contact you. Your physician or their representative will relay the results to you with their recommendations at their soonest availability.     If no improvement in symptoms or symptoms worsen, please be advised to call MD, follow-up at clinic and/or go to ER if becomes severe.    Pedro Pablo Dc M.D.        We Offer TELEHEALTH & Same Day Appointments!   Book your Telehealth appointment with me through my nurse or   Clinic appointments on [employer]!    [address] [place] [address]    Office: [phone]   FAX: [phone]    Check out my Facebook Page and Follow Me at: [email or web address]    Check out my website at [employer] by clicking on: [email or web address]    To Schedule appointments online, go to [employer]: [email or web address]

## 2022-11-10 ENCOUNTER — TELEPHONE (OUTPATIENT)
Dept: FAMILY MEDICINE | Facility: CLINIC | Age: 72
End: 2022-11-10
Payer: MEDICARE

## 2022-11-10 DIAGNOSIS — F41.9 ANXIETY: Chronic | ICD-10-CM

## 2022-11-10 NOTE — TELEPHONE ENCOUNTER
----- Message from Ayanna Richardson sent at 11/10/2022  3:29 PM CST -----  Contact: Andrés Fortune is calling to discuss buPROPion medication. He stated that meds is not working and would like to know if meds dosage can be changed or if another drug can eb called in. Please call him back at [phone].          Thanks  DD

## 2022-11-11 RX ORDER — BUPROPION HYDROCHLORIDE 300 MG/1
300 TABLET ORAL DAILY
Qty: 30 TABLET | Refills: 11 | Status: SHIPPED | OUTPATIENT
Start: 2022-11-11 | End: 2023-11-30

## 2022-11-11 NOTE — TELEPHONE ENCOUNTER
I have signed for the following orders AND/OR meds.  Please call the patient and ask the patient to schedule the testing AND/OR inform about any medications that were sent.      No orders of the defined types were placed in this encounter.      Medications Ordered This Encounter   Medications    buPROPion (WELLBUTRIN XL) 300 MG 24 hr tablet     Sig: Take 1 tablet (300 mg total) by mouth once daily.     Dispense:  30 tablet     Refill:  11

## 2022-11-11 NOTE — TELEPHONE ENCOUNTER
"Spoke to pt. Advised per  "I have increased the Wellbutrin to 300 milligrams daily.  Please let the patient know to follow-up with me in two weeks if no improvement in symptoms.  Please remind the patient not to stop this medication without discussing with me.  If wanting to change after two weeks he will need to be tapered off of this medication." Pt. Verbalized understanding. Phone call ended.   "

## 2022-11-17 ENCOUNTER — TELEPHONE (OUTPATIENT)
Dept: FAMILY MEDICINE | Facility: CLINIC | Age: 72
End: 2022-11-17
Payer: MEDICARE

## 2022-11-17 DIAGNOSIS — G47.33 OSA (OBSTRUCTIVE SLEEP APNEA): Primary | ICD-10-CM

## 2022-11-17 NOTE — TELEPHONE ENCOUNTER
Spoke to pt. He advised Dr. Santacruz was managing this in 2016 he will call them and call us back if they can not renew order.

## 2022-11-17 NOTE — TELEPHONE ENCOUNTER
----- Message from Cass López sent at 11/17/2022  9:39 AM CST -----  Contact: Self  Pt is asking for an return call in reference to needing an a refill on a medication , please call back at .[phone] Thx CJ

## 2022-11-18 ENCOUNTER — TELEPHONE (OUTPATIENT)
Dept: CARDIOLOGY | Facility: CLINIC | Age: 72
End: 2022-11-18
Payer: MEDICARE

## 2022-11-18 NOTE — TELEPHONE ENCOUNTER
Patient is take Lisinopril 40mg and amlodipine 10 mg and a new medicine from his urinologist gave him called Terazosin 5mg and wants to know if it is ok to take them together because the new med. Deal with blood pressure as well. Please advise.     ----- Message from Nathalie Buck sent at 11/18/2022 11:40 AM CST -----  Contact: Patient, [phone]  Calling to speak with the nurse regarding his medications. Please call her. Thanks.

## 2022-11-18 NOTE — TELEPHONE ENCOUNTER
Spoke to patient and advise him to start BP log to keven in to his follow up appointment I schedule for  1/27/2023 @ 2:40 in [facility]. Patient . Patient stated understanding.

## 2022-11-23 ENCOUNTER — TELEPHONE (OUTPATIENT)
Dept: FAMILY MEDICINE | Facility: CLINIC | Age: 72
End: 2022-11-23
Payer: MEDICARE

## 2022-11-23 NOTE — TELEPHONE ENCOUNTER
----- Message from Alicia Nelson sent at 11/23/2022 10:40 AM CST -----  Contact: [phone]  Patient would like to consult with a nurse in regards to his scheduled appt on 11/30. The patient is wanting to know if he should come to this appt since he came in to office for a an annual visit. Please call back at [phone]. Thanks r/s

## 2022-11-23 NOTE — TELEPHONE ENCOUNTER
I spoke with the patient about this. Pt is requesting we reschedule his appointment. Appointment rescheduled and appointment reminder mailed to patient.

## 2022-12-01 ENCOUNTER — TELEPHONE (OUTPATIENT)
Dept: SLEEP MEDICINE | Facility: CLINIC | Age: 72
End: 2022-12-01
Payer: MEDICARE

## 2022-12-01 NOTE — TELEPHONE ENCOUNTER
----- Message from Sherlyn Chaudhary sent at 12/1/2022 12:53 PM CST -----  Contact: pt  Pt is calling to follow up on a request for her mask , the one that he has is broken and needs to have a new one called in to his pharm / pt would like a callback today about it [phone]//thanks/dbw     [facility] in [place]  [phone] (Fax)  adelaida

## 2022-12-01 NOTE — TELEPHONE ENCOUNTER
----- Message from Betsy Santoyo sent at 12/1/2022  1:55 PM CST -----  Contact: NACHO PATRICK [[id number]]  Type:  Patient Returning Call      Who Called:   NACHO PATRICK [[id number]]      Who Left Message for Patient: Smiley Maravilla MA      Does the patient know what this is regarding?: Yes      Would the patient rather a call back or a response via [facility]?  Call back       Best Call Back Number: [phone]      Additional Information:

## 2022-12-20 NOTE — PROGRESS NOTES
Health Maintenance Updated.   Immunizations: Abstracted.   Care Everywhere: Abstracted: No HM results found.   [facility] Search: Patient found. No results.   Health Maintenance Due   Topic    TETANUS VACCINE     Shingles Vaccine (1 of 2)    Pneumococcal Vaccine (65+ Low/Medium Risk) (1 of 2 - PCV13)    Lipid Panel       Attempted to reach out to Pt to schedule lipid panel & AWV. Pt did not answer left voicemail with callback number.    Yes

## 2022-12-28 ENCOUNTER — PATIENT MESSAGE (OUTPATIENT)
Dept: FAMILY MEDICINE | Facility: CLINIC | Age: 72
End: 2022-12-28
Payer: MEDICARE

## 2022-12-28 ENCOUNTER — TELEPHONE (OUTPATIENT)
Dept: FAMILY MEDICINE | Facility: CLINIC | Age: 72
End: 2022-12-28
Payer: MEDICARE

## 2022-12-28 DIAGNOSIS — U07.1 COVID-19: Primary | ICD-10-CM

## 2022-12-28 RX ORDER — PROMETHAZINE HYDROCHLORIDE AND DEXTROMETHORPHAN HYDROBROMIDE 6.25; 15 MG/5ML; MG/5ML
5 SYRUP ORAL EVERY 4 HOURS PRN
Qty: 120 ML | Refills: 0 | Status: SHIPPED | OUTPATIENT
Start: 2022-12-28 | End: 2023-01-07

## 2022-12-28 RX ORDER — ALBUTEROL SULFATE 90 UG/1
2 AEROSOL, METERED RESPIRATORY (INHALATION) EVERY 6 HOURS PRN
Qty: 18 G | Refills: 1 | Status: SHIPPED | OUTPATIENT
Start: 2022-12-28

## 2022-12-28 RX ORDER — PREDNISONE 20 MG/1
20 TABLET ORAL DAILY
Qty: 5 TABLET | Refills: 0 | Status: SHIPPED | OUTPATIENT
Start: 2022-12-28 | End: 2023-01-02

## 2022-12-28 NOTE — TELEPHONE ENCOUNTER
----- Message from Zandra Winston sent at 12/28/2022 12:37 PM CST -----  Contact: [facility]  Type:  Pharmacy Calling to Clarify an RX    Name of Caller: Beatriz  Pharmacy Name:   [id number] - [place], [place] - [address] [address]  Phone: [phone] Fax: [phone]  Prescription Name: Paxlovid  What do they need to clarify?: He is currently taken Alfuzosin (UROXATRAL) 10 mg Tb24 which is a drug interaction  Best Call Back Number: Please call her at [phone]  Additional Information:

## 2022-12-29 ENCOUNTER — PATIENT MESSAGE (OUTPATIENT)
Dept: FAMILY MEDICINE | Facility: CLINIC | Age: 72
End: 2022-12-29

## 2023-01-06 ENCOUNTER — TELEPHONE (OUTPATIENT)
Dept: FAMILY MEDICINE | Facility: CLINIC | Age: 73
End: 2023-01-06
Payer: MEDICARE

## 2023-01-06 RX ORDER — DOXYCYCLINE 100 MG/1
100 CAPSULE ORAL EVERY 12 HOURS
Qty: 20 CAPSULE | Refills: 0 | Status: SHIPPED | OUTPATIENT
Start: 2023-01-06 | End: 2023-04-03 | Stop reason: ALTCHOICE

## 2023-01-06 RX ORDER — PREDNISONE 20 MG/1
20 TABLET ORAL DAILY
Qty: 5 TABLET | Refills: 0 | Status: SHIPPED | OUTPATIENT
Start: 2023-01-06 | End: 2023-01-11

## 2023-01-06 RX ORDER — BENZONATATE 200 MG/1
200 CAPSULE ORAL 3 TIMES DAILY PRN
Qty: 30 CAPSULE | Refills: 0 | Status: SHIPPED | OUTPATIENT
Start: 2023-01-06 | End: 2023-01-16

## 2023-01-06 NOTE — TELEPHONE ENCOUNTER
I have signed for the following orders AND/OR meds.  Please call the patient and ask the patient to schedule the testing AND/OR inform about any medications that were sent.      No orders of the defined types were placed in this encounter.      Medications Ordered This Encounter   Medications    benzonatate (TESSALON) 200 MG capsule     Sig: Take 1 capsule (200 mg total) by mouth 3 (three) times daily as needed for Cough.     Dispense:  30 capsule     Refill:  0    doxycycline (VIBRAMYCIN) 100 MG Cap     Sig: Take 1 capsule (100 mg total) by mouth every 12 (twelve) hours.     Dispense:  20 capsule     Refill:  0    predniSONE (DELTASONE) 20 MG tablet     Sig: Take 1 tablet (20 mg total) by mouth once daily. for 5 days     Dispense:  5 tablet     Refill:  0

## 2023-01-06 NOTE — TELEPHONE ENCOUNTER
----- Message from Silvia Gutierrez sent at 1/6/2023  3:14 PM CST -----  Contact: Andrés  Patient is calling to speak with the nurse regarding medication. Reports having a lingering cough and inflammation in lungs and wants medication to be called in as soon as possible.Reports using inhaler but speeds up but it keeps him up at night. Please call patient .[phone]      .  [facility]/pharmacy #[id number] - [place] - [address]  [place] [address]  Phone: [phone] Fax: [phone]

## 2023-01-27 ENCOUNTER — OFFICE VISIT (OUTPATIENT)
Dept: CARDIOLOGY | Facility: CLINIC | Age: 73
End: 2023-01-27
Payer: MEDICARE

## 2023-01-27 VITALS
HEART RATE: 84 BPM | WEIGHT: 210.13 LBS | OXYGEN SATURATION: 95 % | SYSTOLIC BLOOD PRESSURE: 120 MMHG | DIASTOLIC BLOOD PRESSURE: 68 MMHG | BODY MASS INDEX: 29.3 KG/M2

## 2023-01-27 DIAGNOSIS — R00.0 TACHYCARDIA: ICD-10-CM

## 2023-01-27 DIAGNOSIS — R00.2 PALPITATION: ICD-10-CM

## 2023-01-27 DIAGNOSIS — G47.33 OSA (OBSTRUCTIVE SLEEP APNEA): ICD-10-CM

## 2023-01-27 DIAGNOSIS — R94.31 ABNORMAL ECG: ICD-10-CM

## 2023-01-27 DIAGNOSIS — I45.10 RBBB: ICD-10-CM

## 2023-01-27 DIAGNOSIS — F41.9 ANXIETY: ICD-10-CM

## 2023-01-27 DIAGNOSIS — I10 ESSENTIAL HYPERTENSION: ICD-10-CM

## 2023-01-27 DIAGNOSIS — I10 PRIMARY HYPERTENSION: Primary | ICD-10-CM

## 2023-01-27 PROCEDURE — 99214 OFFICE O/P EST MOD 30 MIN: CPT | Mod: HCNC,S$GLB,, | Performed by: INTERNAL MEDICINE

## 2023-01-27 PROCEDURE — 3288F PR FALLS RISK ASSESSMENT DOCUMENTED: ICD-10-PCS | Mod: HCNC,CPTII,S$GLB, | Performed by: INTERNAL MEDICINE

## 2023-01-27 PROCEDURE — 1101F PT FALLS ASSESS-DOCD LE1/YR: CPT | Mod: HCNC,CPTII,S$GLB, | Performed by: INTERNAL MEDICINE

## 2023-01-27 PROCEDURE — 1159F MED LIST DOCD IN RCRD: CPT | Mod: HCNC,CPTII,S$GLB, | Performed by: INTERNAL MEDICINE

## 2023-01-27 PROCEDURE — 99214 PR OFFICE/OUTPT VISIT, EST, LEVL IV, 30-39 MIN: ICD-10-PCS | Mod: HCNC,S$GLB,, | Performed by: INTERNAL MEDICINE

## 2023-01-27 PROCEDURE — 4010F PR ACE/ARB THEARPY RXD/TAKEN: ICD-10-PCS | Mod: HCNC,CPTII,S$GLB, | Performed by: INTERNAL MEDICINE

## 2023-01-27 PROCEDURE — 3078F DIAST BP <80 MM HG: CPT | Mod: HCNC,CPTII,S$GLB, | Performed by: INTERNAL MEDICINE

## 2023-01-27 PROCEDURE — 99999 PR PBB SHADOW E&M-EST. PATIENT-LVL IV: CPT | Mod: PBBFAC,HCNC,, | Performed by: INTERNAL MEDICINE

## 2023-01-27 PROCEDURE — 1101F PR PT FALLS ASSESS DOC 0-1 FALLS W/OUT INJ PAST YR: ICD-10-PCS | Mod: HCNC,CPTII,S$GLB, | Performed by: INTERNAL MEDICINE

## 2023-01-27 PROCEDURE — 3078F PR MOST RECENT DIASTOLIC BLOOD PRESSURE < 80 MM HG: ICD-10-PCS | Mod: HCNC,CPTII,S$GLB, | Performed by: INTERNAL MEDICINE

## 2023-01-27 PROCEDURE — 3074F PR MOST RECENT SYSTOLIC BLOOD PRESSURE < 130 MM HG: ICD-10-PCS | Mod: HCNC,CPTII,S$GLB, | Performed by: INTERNAL MEDICINE

## 2023-01-27 PROCEDURE — 3008F PR BODY MASS INDEX (BMI) DOCUMENTED: ICD-10-PCS | Mod: HCNC,CPTII,S$GLB, | Performed by: INTERNAL MEDICINE

## 2023-01-27 PROCEDURE — 1126F PR PAIN SEVERITY QUANTIFIED, NO PAIN PRESENT: ICD-10-PCS | Mod: HCNC,CPTII,S$GLB, | Performed by: INTERNAL MEDICINE

## 2023-01-27 PROCEDURE — 1126F AMNT PAIN NOTED NONE PRSNT: CPT | Mod: HCNC,CPTII,S$GLB, | Performed by: INTERNAL MEDICINE

## 2023-01-27 PROCEDURE — 3008F BODY MASS INDEX DOCD: CPT | Mod: HCNC,CPTII,S$GLB, | Performed by: INTERNAL MEDICINE

## 2023-01-27 PROCEDURE — 4010F ACE/ARB THERAPY RXD/TAKEN: CPT | Mod: HCNC,CPTII,S$GLB, | Performed by: INTERNAL MEDICINE

## 2023-01-27 PROCEDURE — 1159F PR MEDICATION LIST DOCUMENTED IN MEDICAL RECORD: ICD-10-PCS | Mod: HCNC,CPTII,S$GLB, | Performed by: INTERNAL MEDICINE

## 2023-01-27 PROCEDURE — 99999 PR PBB SHADOW E&M-EST. PATIENT-LVL IV: ICD-10-PCS | Mod: PBBFAC,HCNC,, | Performed by: INTERNAL MEDICINE

## 2023-01-27 PROCEDURE — 3288F FALL RISK ASSESSMENT DOCD: CPT | Mod: HCNC,CPTII,S$GLB, | Performed by: INTERNAL MEDICINE

## 2023-01-27 PROCEDURE — 3074F SYST BP LT 130 MM HG: CPT | Mod: HCNC,CPTII,S$GLB, | Performed by: INTERNAL MEDICINE

## 2023-01-27 RX ORDER — AMLODIPINE BESYLATE 10 MG/1
10 TABLET ORAL DAILY
Qty: 90 TABLET | Refills: 3 | Status: SHIPPED | OUTPATIENT
Start: 2023-01-27 | End: 2023-11-07

## 2023-01-27 NOTE — PROGRESS NOTES
Subjective:   Patient ID:  Andrés Casillas is a 72 y.o. male who presents for follow-up of No chief complaint on file.  Recent medication change include amlodipine 2.5 mg daily.  Patient denies chest pain, angina or symptoms associated with anginal equivalent.  Currently patient is taking lisinopril 40 mg daily and amlodipine 5 mg daily.  Blood pressure response last several days have been excellent will continue with this line of medication.  Patient here for follow-up evaluation hypertension.  Tachycardia past is resolving.  Normal stress test and intermittent mild palpitations with PVCs on recent Holter monitor.     Patient stable blood pressure still elevated will increase amlodipine 10 mg daily follow-up evaluation in 1 month.  Patient would like to be more active and walk more daily.     Patient more active in doing well blood pressure heart rate stable today.   Patient denies chest pain, angina or symptoms associated with anginal equivalent.  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     This visit finds patient feeling well stable heart rate blood pressure chronic medications.  No significant tachyarrhythmias noted.  EKG showed normal sinus rhythm right bundle-branch block no acute changes.       01/27/2023: Overall patient is doing well no recurrence symptoms of chest pain or shortness of breath heart rate blood pressure stable he is doing well this time      Review of Systems   Constitutional: Negative for chills, diaphoresis, night sweats, weight gain and weight loss.   HENT:  Negative for congestion, hoarse voice, sore throat and stridor.    Eyes:  Negative for double vision and pain.   Cardiovascular:   Negative for chest pain, claudication, cyanosis, dyspnea on exertion, irregular heartbeat, leg swelling, near-syncope, orthopnea, palpitations, paroxysmal nocturnal dyspnea and syncope.   Respiratory:  Negative for cough, hemoptysis, shortness of breath, sleep disturbances due to breathing, snoring, sputum production and wheezing.    Endocrine: Negative for cold intolerance, heat intolerance and polydipsia.   Hematologic/Lymphatic: Negative for bleeding problem. Does not bruise/bleed easily.   Skin:  Negative for color change, dry skin and rash.   Musculoskeletal:  Negative for joint swelling and muscle cramps.   Gastrointestinal:  Negative for bloating, abdominal pain, constipation, diarrhea, dysphagia, melena, nausea and vomiting.   Genitourinary:  Negative for flank pain and urgency.   Neurological:  Negative for dizziness, focal weakness, headaches, light-headedness, loss of balance, seizures and weakness.   Psychiatric/Behavioral:  Negative for altered mental status and memory loss. The patient is not nervous/anxious.    Family History   Problem Relation Age of Onset    Heart disease Mother     Cancer Mother     Heart disease Father     Prostate cancer Brother      Past Medical History:   Diagnosis Date    Anxiety     BPH (benign prostatic hypertrophy)     DDD (degenerative disc disease), lumbar     Hypertension     GAURANG (obstructive sleep apnea)      Social History     Socioeconomic History    Marital status: Single   Occupational History     Employer: [employer]   Tobacco Use    Smoking status: Never    Smokeless tobacco: Never   Substance and Sexual Activity    Alcohol use: Yes     Alcohol/week: 1.7 standard drinks     Types: 2 Standard drinks or equivalent per week     Comment: socially    Drug use: No    Sexual activity: Yes     Partners: Female   Social History Narrative    ** Merged History Encounter **          Current Outpatient Medications on File Prior to Visit   Medication Sig Dispense Refill     albuterol (PROVENTIL/VENTOLIN HFA) 90 mcg/actuation inhaler INHALE 2 PUFFS BY MOUTH EVERY 6 HOURS AS NEEDED FOR WHEEZING 54 g 3    albuterol (PROVENTIL/VENTOLIN HFA) 90 mcg/actuation inhaler Inhale 2 puffs into the lungs every 6 (six) hours as needed for Wheezing. 18 g 1    alfuzosin (UROXATRAL) 10 mg Tb24       ALPRAZolam (XANAX) 1 MG tablet Take 1 tablet (1 mg total) by mouth 3 (three) times daily. 90 tablet 5    amLODIPine (NORVASC) 10 MG tablet TAKE 1 TABLET BY MOUTH EVERY DAY 90 tablet 3    buPROPion (WELLBUTRIN XL) 300 MG 24 hr tablet Take 1 tablet (300 mg total) by mouth once daily. 30 tablet 11    co-enzyme Q-10 30 mg capsule Take 30 mg by mouth once daily.       DIINDOLYLMETHANE, BULK, MISC 100 mg by Other route.      dorzolamide-timolol, PF, (COSOPT PF) 2-0.5 % Dpet ophthalmic solution       doxycycline (VIBRAMYCIN) 100 MG Cap Take 1 capsule (100 mg total) by mouth every 12 (twelve) hours. 20 capsule 0    fish oil-omega-3 fatty acids 300-1,000 mg capsule Take 1 capsule by mouth once daily.       furosemide (LASIX) 40 MG tablet TAKE 1 TABLET BY MOUTH DAILY AS NEEDED FOR FLUID RETENSION (Patient not taking: Reported on 10/24/2022) 90 tablet 4    latanoprost 0.005 % ophthalmic solution Place 1 drop into both eyes nightly.      lisinopriL (PRINIVIL,ZESTRIL) 40 MG tablet TAKE 1 TABLET BY MOUTH EVERY DAY 90 tablet 3    magnesium oxide (MAG-OX) 400 mg (241.3 mg magnesium) tablet Take 400 mg by mouth once daily.       montelukast (SINGULAIR) 10 mg tablet TAKE 1 TABLET BY MOUTH EVERY DAY IN THE EVENING (Patient not taking: Reported on 10/24/2022) 90 tablet 2    tadalafiL (CIALIS) 5 MG tablet Take 5 mg by mouth once daily.      tamsulosin (FLOMAX) 0.4 mg Cp24 0.4 mg.       terbinafine HCL (LAMISIL) 250 mg tablet TAKE 1 TABLET BY MOUTH AS DIRECTED TAKE CONSECUTIVE 10 DAYS OF A MONTH X 3 MONTHS      testosterone cypionate (DEPOTESTOTERONE CYPIONATE) 100 mg/mL injection Inject into the muscle.      zinc gluconate 50 mg  tablet Take 50 mg by mouth.       No current facility-administered medications on file prior to visit.     Review of patient's allergies indicates:   Allergen Reactions    No known drug allergies        Objective:     Physical Exam  Eyes:      Pupils: Pupils are equal, round, and reactive to light.   Neck:      Trachea: No tracheal deviation.   Cardiovascular:      Rate and Rhythm: Normal rate and regular rhythm.      Pulses: Intact distal pulses.           Carotid pulses are 2+ on the right side and 2+ on the left side.       Radial pulses are 2+ on the right side and 2+ on the left side.        Femoral pulses are 2+ on the right side and 2+ on the left side.       Popliteal pulses are 2+ on the right side and 2+ on the left side.        Dorsalis pedis pulses are 2+ on the right side and 2+ on the left side.        Posterior tibial pulses are 2+ on the right side and 2+ on the left side.      Heart sounds: Normal heart sounds. No murmur heard.    No friction rub. No gallop.   Pulmonary:      Effort: Pulmonary effort is normal. No respiratory distress.      Breath sounds: Normal breath sounds. No stridor. No wheezing or rales.   Chest:      Chest wall: No tenderness.   Abdominal:      General: There is no distension.      Tenderness: There is no abdominal tenderness. There is no rebound.   Musculoskeletal:         General: No tenderness.      Cervical back: Normal range of motion.   Skin:     General: Skin is warm and dry.   Neurological:      Mental Status: He is alert and oriented to person, place, and time.     Assessment:     1. Primary hypertension    2. GAURANG (obstructive sleep apnea)    3. Palpitation    4. Tachycardia    5. Abnormal ECG    6. Essential hypertension        Plan:     Primary hypertension    GAURNAG (obstructive sleep apnea)    Palpitation    Tachycardia    Abnormal ECG    Essential hypertension      Impression 1. Primary hypertension stable current medications including lisinopril and amlodipine  2.  Tachycardia stable   3. Peripheral edema resolved   All questions answered patient doing well this time no acute changes follow-up evaluation again in 8 months sooner if there are acute recurrence symptoms.

## 2023-02-01 ENCOUNTER — TELEPHONE (OUTPATIENT)
Dept: FAMILY MEDICINE | Facility: CLINIC | Age: 73
End: 2023-02-01
Payer: MEDICARE

## 2023-02-02 ENCOUNTER — OFFICE VISIT (OUTPATIENT)
Dept: SLEEP MEDICINE | Facility: CLINIC | Age: 73
End: 2023-02-02
Payer: MEDICARE

## 2023-02-02 DIAGNOSIS — G47.33 OSA (OBSTRUCTIVE SLEEP APNEA): Primary | ICD-10-CM

## 2023-02-02 PROCEDURE — 1160F PR REVIEW ALL MEDS BY PRESCRIBER/CLIN PHARMACIST DOCUMENTED: ICD-10-PCS | Mod: HCNC,CPTII,95, | Performed by: PSYCHIATRY & NEUROLOGY

## 2023-02-02 PROCEDURE — 4010F ACE/ARB THERAPY RXD/TAKEN: CPT | Mod: HCNC,CPTII,95, | Performed by: PSYCHIATRY & NEUROLOGY

## 2023-02-02 PROCEDURE — 99214 OFFICE O/P EST MOD 30 MIN: CPT | Mod: 95,,, | Performed by: PSYCHIATRY & NEUROLOGY

## 2023-02-02 PROCEDURE — 1160F RVW MEDS BY RX/DR IN RCRD: CPT | Mod: HCNC,CPTII,95, | Performed by: PSYCHIATRY & NEUROLOGY

## 2023-02-02 PROCEDURE — 4010F PR ACE/ARB THEARPY RXD/TAKEN: ICD-10-PCS | Mod: HCNC,CPTII,95, | Performed by: PSYCHIATRY & NEUROLOGY

## 2023-02-02 PROCEDURE — 99214 PR OFFICE/OUTPT VISIT, EST, LEVL IV, 30-39 MIN: ICD-10-PCS | Mod: 95,,, | Performed by: PSYCHIATRY & NEUROLOGY

## 2023-02-02 PROCEDURE — 1159F MED LIST DOCD IN RCRD: CPT | Mod: HCNC,CPTII,95, | Performed by: PSYCHIATRY & NEUROLOGY

## 2023-02-02 PROCEDURE — 1159F PR MEDICATION LIST DOCUMENTED IN MEDICAL RECORD: ICD-10-PCS | Mod: HCNC,CPTII,95, | Performed by: PSYCHIATRY & NEUROLOGY

## 2023-02-02 NOTE — PROGRESS NOTES
EPWORTH SLEEPINESS SCALE TOTAL SCORE  2/2/2023 3/13/2019 9/21/2018   Total score 2 7 8       Andrés Casillas is a 72 y.o. male seen today for CPAP  follow up. Last seen on 3/13/2019    Still using his APAP compliantly.  Using a full face mask - he feels the starting pressure feels too hig  Hard to keep his mouth closed, otherwise mouth leaks cause mask leaks - sometimes    Airfit F20 Large (had Medium before).    I do not have an access to his machine (got through [facility]); thinks it was over 5 years ago  The current machine is old, malfunctioning shutting off at night      DME:  Got his current REsmed 10 through [facility] - currently using small DME in [place] for supplies.     Social History:  Occupation:  Bed partner:   Exercise routine:   Caffeine:  Coffee   , cokes  , tea       PREVIOUS SLEEP STUDIES:     HST 8/15/18: Significant Obstructive sleep apnea (GAURANG) with AHI (apnea hypopnea Index) of 17.2 and SaO2 of 89% .  Previous baseline N/a.        PAST MEDICAL HISTORY:    Active Ambulatory Problems     Diagnosis Date Noted    Essential hypertension     BPH (benign prostatic hypertrophy)     GAURANG (obstructive sleep apnea)     Osteoarthritis of right knee 06/19/2019    Anxiety 07/13/2020    Hypotestosteronemia 07/13/2020    Encounter for long-term (current) use of medications 07/13/2020    Benzodiazepine dependence, continuous 07/15/2020    Ganglion cyst of wrist, right 10/16/2020    Palpitation 10/16/2020    Epigastric pain 10/16/2020    Abnormal ECG 10/16/2020    Vitamin D deficiency 10/22/2021    Vitamin B12 deficiency 10/22/2021    Need for hepatitis C screening test 05/27/2022    Elevated prostate specific antigen (PSA) 10/10/2022    Low testosterone 10/10/2022     Resolved Ambulatory Problems     Diagnosis Date Noted    No Resolved Ambulatory Problems     Past Medical History:   Diagnosis Date    BPH (benign prostatic hypertrophy)     DDD (degenerative disc disease), lumbar     Hypertension                  PAST SURGICAL HISTORY:    Past Surgical History:   Procedure Laterality Date    JOINT REPLACEMENT           FAMILY HISTORY:                Family History   Problem Relation Age of Onset    Heart disease Mother     Cancer Mother     Heart disease Father     Prostate cancer Brother        SOCIAL HISTORY:          Tobacco:   Social History     Tobacco Use   Smoking Status Never   Smokeless Tobacco Never       alcohol use:    Social History     Substance and Sexual Activity   Alcohol Use Yes    Alcohol/week: 1.7 standard drinks    Types: 2 Standard drinks or equivalent per week    Comment: socially                   ALLERGIES:    Review of patient's allergies indicates:   Allergen Reactions    No known drug allergies        CURRENT MEDICATIONS:    Current Outpatient Medications   Medication Sig Dispense Refill    albuterol (PROVENTIL/VENTOLIN HFA) 90 mcg/actuation inhaler INHALE 2 PUFFS BY MOUTH EVERY 6 HOURS AS NEEDED FOR WHEEZING 54 g 3    albuterol (PROVENTIL/VENTOLIN HFA) 90 mcg/actuation inhaler Inhale 2 puffs into the lungs every 6 (six) hours as needed for Wheezing. 18 g 1    alfuzosin (UROXATRAL) 10 mg Tb24       ALPRAZolam (XANAX) 1 MG tablet Take 1 tablet (1 mg total) by mouth 3 (three) times daily. 90 tablet 5    amLODIPine (NORVASC) 10 MG tablet Take 1 tablet (10 mg total) by mouth once daily. 90 tablet 3    buPROPion (WELLBUTRIN XL) 300 MG 24 hr tablet Take 1 tablet (300 mg total) by mouth once daily. 30 tablet 11    co-enzyme Q-10 30 mg capsule Take 30 mg by mouth once daily.       DIINDOLYLMETHANE, BULK, MISC 100 mg by Other route.      dorzolamide-timolol, PF, (COSOPT PF) 2-0.5 % Dpet ophthalmic solution       doxycycline (VIBRAMYCIN) 100 MG Cap Take 1 capsule (100 mg total) by mouth every 12 (twelve) hours. 20 capsule 0    fish oil-omega-3 fatty acids 300-1,000 mg capsule Take 1 capsule by mouth once daily.       furosemide (LASIX) 40 MG tablet TAKE 1 TABLET BY MOUTH DAILY AS NEEDED FOR FLUID  RETENSION (Patient not taking: Reported on 10/24/2022) 90 tablet 4    latanoprost 0.005 % ophthalmic solution Place 1 drop into both eyes nightly.      lisinopriL (PRINIVIL,ZESTRIL) 40 MG tablet TAKE 1 TABLET BY MOUTH EVERY DAY 90 tablet 3    magnesium oxide (MAG-OX) 400 mg (241.3 mg magnesium) tablet Take 400 mg by mouth once daily.       montelukast (SINGULAIR) 10 mg tablet TAKE 1 TABLET BY MOUTH EVERY DAY IN THE EVENING (Patient not taking: Reported on 10/24/2022) 90 tablet 2    tadalafiL (CIALIS) 5 MG tablet Take 5 mg by mouth once daily.      tamsulosin (FLOMAX) 0.4 mg Cp24 0.4 mg.       terbinafine HCL (LAMISIL) 250 mg tablet TAKE 1 TABLET BY MOUTH AS DIRECTED TAKE CONSECUTIVE 10 DAYS OF A MONTH X 3 MONTHS      testosterone cypionate (DEPOTESTOTERONE CYPIONATE) 100 mg/mL injection Inject into the muscle.      zinc gluconate 50 mg tablet Take 50 mg by mouth.       No current facility-administered medications for this visit.                        PHYSICAL EXAM:  There were no vitals taken for this visit.  GENERAL: Overweight body habitus, well groomed.  HEENT:   HEENT:  Conjunctivae are non-erythematous; Pupils equal, round, and reactive to light; Nose is symmetrical; Nasal mucosa is pink and moist; Septum is midline; Inferior turbinates are normal; Nasal airflow is normal; Posterior pharynx is pink; Modified Mallampati:III-IV; Posterior palate is low; Tonsils not visualized; Uvula is wide and elongated;Tongue is enlarged; Dentition is fair; No TMJ tenderness; Jaw opening and protrusion without click and without discomfort.  NECK: Supple. Neck circumference is 16 inches. No thyromegaly. No palpable nodes.     SKIN: On face and neck: No abrasions, no rashes, no lesions.  No subcutaneous nodules are palpable.  RESPIRATORY: Chest is clear to auscultation.  Normal chest expansion and non-labored breathing at rest.  CARDIOVASCULAR: Normal S1, S2.  No murmurs, gallops or rubs. No carotid bruits bilaterally.  No  "edema. No clubbing. No cyanosis.    NEURO: Oriented to time, place and person. Normal attention span and concentration. Gait normal.    PSYCH: Affect is full. Mood is normal  MUSCULOSKELETAL: Moves 4 extremities. Gait normal.         Using [facility]: no    ASSESSMENT:    1. GAURANG (obstructive sleep apnea). The patient symptomatically has  excessive daytime sleepiness, snoring,  witnessed breathing pauses, excessive daytime fatigue, gasping for air in sleep, interrupted sleep and nocturia  with exam findings of "a crowded oral airway and elevated body mass index.Some subjective lack of air.        PLAN:  He will try to remember the old style mask he used in the past and will see if its still available.  For now, mask fitting session was recommended.  Given that his Resmed machine is malfunctioning and may be over 4 yo, will order a new Resmed  APAP through [facility] in [place] - the patient was explained that he could possibly get the mask fitting session done at the time of CPAP .         More than 15 minutes of this 30 minutes visit was spent in counseling: during our discussion today, we talked about the etiology of  GAURANG as well as the potential ramifications of untreated sleep apnea, which could include daytime sleepiness, hypertension, heart disease and/or stroke.  We discussed potential treatment options, which could include weight loss, body positioning, continuous positive airway pressure (CPAP), or referral for surgical consideration. Meanwhile, he  is urged to avoid supine sleep, weight gain and alcoholic beverages since all of these can worsen GAURANG.     Precautions: The patient was advised to abstain from driving should he feel sleepy or drowsy.    Follow up: 8 months or sooner in case he gets a new machine - 31-90 days     Thank you for allowing me the opportunity to participate in the care of your patient.    This visit summary will be sent to referring provider via inbasket        "

## 2023-02-09 DIAGNOSIS — Z00.00 ENCOUNTER FOR MEDICARE ANNUAL WELLNESS EXAM: ICD-10-CM

## 2023-02-12 DIAGNOSIS — Z79.899 ENCOUNTER FOR LONG-TERM (CURRENT) USE OF MEDICATIONS: ICD-10-CM

## 2023-02-12 DIAGNOSIS — F41.9 ANXIETY: Chronic | ICD-10-CM

## 2023-02-12 NOTE — TELEPHONE ENCOUNTER
No new care gaps identified.  [facility] Embedded Care Gaps. Reference number: [id number]. 2/12/2023   1:41:23 PM CST

## 2023-02-13 ENCOUNTER — TELEPHONE (OUTPATIENT)
Dept: FAMILY MEDICINE | Facility: CLINIC | Age: 73
End: 2023-02-13
Payer: MEDICARE

## 2023-02-13 RX ORDER — ALPRAZOLAM 1 MG/1
TABLET ORAL
Qty: 90 TABLET | Refills: 0 | Status: SHIPPED | OUTPATIENT
Start: 2023-02-13 | End: 2023-03-14

## 2023-02-13 NOTE — TELEPHONE ENCOUNTER
----- Message from Salvador Tellez sent at 2/13/2023  4:23 PM CST -----  Contact: [phone]  Pt is calling requesting status medication refill , ALPRAZolam (XANAX) 1 MG tablet , pt states he called this morning and no one returned call . Please call back at [phone] . Thanks.shalini             [facility]/pharmacy #[id number] - [place], [place] - [address]  [place] [address]  Phone: [phone] Fax: [phone]

## 2023-03-12 DIAGNOSIS — Z79.899 ENCOUNTER FOR LONG-TERM (CURRENT) USE OF MEDICATIONS: ICD-10-CM

## 2023-03-12 DIAGNOSIS — F41.9 ANXIETY: Chronic | ICD-10-CM

## 2023-03-12 NOTE — TELEPHONE ENCOUNTER
No new care gaps identified.  [facility] Embedded Care Gaps. Reference number: [id number]. 3/12/2023   3:14:38 PM CDT

## 2023-03-13 ENCOUNTER — NURSE TRIAGE (OUTPATIENT)
Dept: ADMINISTRATIVE | Facility: CLINIC | Age: 73
End: 2023-03-13
Payer: MEDICARE

## 2023-03-13 ENCOUNTER — PATIENT MESSAGE (OUTPATIENT)
Dept: FAMILY MEDICINE | Facility: CLINIC | Age: 73
End: 2023-03-13
Payer: MEDICARE

## 2023-03-13 DIAGNOSIS — F41.9 ANXIETY: Chronic | ICD-10-CM

## 2023-03-13 DIAGNOSIS — Z79.899 ENCOUNTER FOR LONG-TERM (CURRENT) USE OF MEDICATIONS: ICD-10-CM

## 2023-03-13 NOTE — TELEPHONE ENCOUNTER
Pt calling in stating that he is completely out of his xanax and needs a refill. Advised of controlled medication protocol. verbalized understanding. Advised that ill send message to provider. verbalized understanding. Denies any further questions or concerns at this time, advised to call back if they have any that come up. Advised pt to call back with any other concerns or worsening symptoms. Verbalized understanding and will route message to provider.     ALPRAZolam (XANAX) 1 MG tablet 90 tablet 0 2/13/2023  No   Sig: TAKE 1 TABLET BY MOUTH 3 TIMES DAILY.   Sent to pharmacy as: ALPRAZolam (XANAX) 1 MG tablet   Class: Normal   Pharmacy    [facility]/PHARMACY #[id number] - [place], [place] - [address]     Reason for Disposition   Caller requesting a CONTROLLED substance prescription refill (e.g., narcotics, ADHD medicines)    Protocols used: Medication Refill and Renewal Call-A-

## 2023-03-13 NOTE — TELEPHONE ENCOUNTER
----- Message from Zandra Winston sent at 3/13/2023 12:44 PM CDT -----  Contact: Andrés  Type:  RX Refill Request    Who Called:  Andrés  Refill or New Rx: refill   RX Name and Strength: Alprazolam (XANAX) 1 MG tablet  How is the patient currently taking it? (ex. 1XDay): 3xday  Is this a 30 day or 90 day RX:   Preferred Pharmacy with phone number:   [facility]/pharmacy #[id number] - [place], [place] - [address] [address]  Phone: [phone] Fax: [phone]  Local or Mail Order: Local  Ordering Provider: Dr. Dc  Would the patient rather a call back or a response via [facility]? Callback   Best Call Back Number: Please call him at [phone]  Additional Information:

## 2023-03-14 ENCOUNTER — TELEPHONE (OUTPATIENT)
Dept: FAMILY MEDICINE | Facility: CLINIC | Age: 73
End: 2023-03-14
Payer: MEDICARE

## 2023-03-14 DIAGNOSIS — Z79.899 ENCOUNTER FOR LONG-TERM (CURRENT) USE OF MEDICATIONS: ICD-10-CM

## 2023-03-14 DIAGNOSIS — F41.9 ANXIETY: Chronic | ICD-10-CM

## 2023-03-14 RX ORDER — ALPRAZOLAM 1 MG/1
1 TABLET ORAL 3 TIMES DAILY
Qty: 90 TABLET | Refills: 0 | Status: SHIPPED | OUTPATIENT
Start: 2023-03-14 | End: 2023-04-03 | Stop reason: SDUPTHER

## 2023-03-14 RX ORDER — ALPRAZOLAM 1 MG/1
TABLET ORAL
Qty: 90 TABLET | Refills: 0 | Status: SHIPPED | OUTPATIENT
Start: 2023-03-14 | End: 2023-03-14 | Stop reason: SDUPTHER

## 2023-03-14 RX ORDER — ALPRAZOLAM 1 MG/1
1 TABLET ORAL 3 TIMES DAILY
Qty: 90 TABLET | Refills: 2 | OUTPATIENT
Start: 2023-03-14

## 2023-03-14 NOTE — TELEPHONE ENCOUNTER
----- Message from Leyda Skaggs sent at 3/14/2023  2:15 PM CDT -----  Contact: Andrés  .Type:  Patient Returning Call    Who Called:Andrés   Who Left Message for Patient:nurse   Does the patient know what this is regarding?:mediation   Would the patient rather a call back or a response via [facility]? Call   Best Call Back Number:.[phone]  Additional Information: Patient requesting medication.

## 2023-03-14 NOTE — TELEPHONE ENCOUNTER
No new care gaps identified.  [facility] Embedded Care Gaps. Reference number: [id number]. 3/14/2023   7:37:46 AM BABAKT

## 2023-03-14 NOTE — TELEPHONE ENCOUNTER
Needs to switch medication over to [facility] pharmacy, please send new script to them.

## 2023-03-14 NOTE — TELEPHONE ENCOUNTER
Pt. Requesting 6 month supply of xanax. Please advise.     Rx pended with 2 refills. Please review and advise.

## 2023-03-14 NOTE — TELEPHONE ENCOUNTER
No new care gaps identified.  [facility] Embedded Care Gaps. Reference number: [id number]. 3/14/2023   2:35:01 PM CDT

## 2023-03-14 NOTE — TELEPHONE ENCOUNTER
----- Message from Remy Sanders sent at 3/14/2023  2:02 PM CDT -----  Contact: [phone]  Pt is calling in regards to getting his ALPRAZolam (XANAX) 1 MG tablet medication. He is needing it to be  sent over to the  [facility] pharmacy phone number  [phone]. please call pt back at [phone], if any question. Thanks KB

## 2023-03-30 ENCOUNTER — TELEPHONE (OUTPATIENT)
Dept: FAMILY MEDICINE | Facility: CLINIC | Age: 73
End: 2023-03-30
Payer: MEDICARE

## 2023-03-30 NOTE — TELEPHONE ENCOUNTER
----- Message from Sheela Murdock sent at 3/30/2023 12:40 PM CDT -----  States he would like to see if he can get an order for PT for his back. Please call pt [phone]. Thank you

## 2023-03-30 NOTE — TELEPHONE ENCOUNTER
Appt with johanny estrada was schedule for Monday for med refills and eval for pt due to back pain. Pt vu

## 2023-04-03 ENCOUNTER — OFFICE VISIT (OUTPATIENT)
Dept: FAMILY MEDICINE | Facility: CLINIC | Age: 73
End: 2023-04-03
Payer: MEDICARE

## 2023-04-03 ENCOUNTER — HOSPITAL ENCOUNTER (OUTPATIENT)
Dept: RADIOLOGY | Facility: HOSPITAL | Age: 73
Discharge: HOME | End: 2023-04-03
Attending: NURSE PRACTITIONER
Payer: MEDICARE

## 2023-04-03 VITALS
OXYGEN SATURATION: 99 % | HEIGHT: 71 IN | WEIGHT: 214 LBS | HEART RATE: 78 BPM | DIASTOLIC BLOOD PRESSURE: 62 MMHG | BODY MASS INDEX: 29.96 KG/M2 | SYSTOLIC BLOOD PRESSURE: 104 MMHG

## 2023-04-03 DIAGNOSIS — M54.2 CERVICALGIA: ICD-10-CM

## 2023-04-03 DIAGNOSIS — K59.00 CONSTIPATION, UNSPECIFIED CONSTIPATION TYPE: ICD-10-CM

## 2023-04-03 DIAGNOSIS — Z79.899 ENCOUNTER FOR LONG-TERM (CURRENT) USE OF MEDICATIONS: ICD-10-CM

## 2023-04-03 DIAGNOSIS — M54.2 CERVICALGIA: Primary | ICD-10-CM

## 2023-04-03 DIAGNOSIS — F41.9 ANXIETY: Chronic | ICD-10-CM

## 2023-04-03 PROCEDURE — 4010F ACE/ARB THERAPY RXD/TAKEN: CPT | Mod: HCNC,CPTII,S$GLB, | Performed by: NURSE PRACTITIONER

## 2023-04-03 PROCEDURE — 1159F MED LIST DOCD IN RCRD: CPT | Mod: HCNC,CPTII,S$GLB, | Performed by: NURSE PRACTITIONER

## 2023-04-03 PROCEDURE — 1101F PR PT FALLS ASSESS DOC 0-1 FALLS W/OUT INJ PAST YR: ICD-10-PCS | Mod: HCNC,CPTII,S$GLB, | Performed by: NURSE PRACTITIONER

## 2023-04-03 PROCEDURE — 3074F PR MOST RECENT SYSTOLIC BLOOD PRESSURE < 130 MM HG: ICD-10-PCS | Mod: HCNC,CPTII,S$GLB, | Performed by: NURSE PRACTITIONER

## 2023-04-03 PROCEDURE — 1126F PR PAIN SEVERITY QUANTIFIED, NO PAIN PRESENT: ICD-10-PCS | Mod: HCNC,CPTII,S$GLB, | Performed by: NURSE PRACTITIONER

## 2023-04-03 PROCEDURE — 99999 PR PBB SHADOW E&M-EST. PATIENT-LVL V: CPT | Mod: PBBFAC,HCNC,, | Performed by: NURSE PRACTITIONER

## 2023-04-03 PROCEDURE — 3008F BODY MASS INDEX DOCD: CPT | Mod: HCNC,CPTII,S$GLB, | Performed by: NURSE PRACTITIONER

## 2023-04-03 PROCEDURE — 1101F PT FALLS ASSESS-DOCD LE1/YR: CPT | Mod: HCNC,CPTII,S$GLB, | Performed by: NURSE PRACTITIONER

## 2023-04-03 PROCEDURE — 99214 OFFICE O/P EST MOD 30 MIN: CPT | Mod: HCNC,S$GLB,, | Performed by: NURSE PRACTITIONER

## 2023-04-03 PROCEDURE — 3288F FALL RISK ASSESSMENT DOCD: CPT | Mod: HCNC,CPTII,S$GLB, | Performed by: NURSE PRACTITIONER

## 2023-04-03 PROCEDURE — 3288F PR FALLS RISK ASSESSMENT DOCUMENTED: ICD-10-PCS | Mod: HCNC,CPTII,S$GLB, | Performed by: NURSE PRACTITIONER

## 2023-04-03 PROCEDURE — 4010F PR ACE/ARB THEARPY RXD/TAKEN: ICD-10-PCS | Mod: HCNC,CPTII,S$GLB, | Performed by: NURSE PRACTITIONER

## 2023-04-03 PROCEDURE — 72040 XR CERVICAL SPINE AP LATERAL: ICD-10-PCS | Mod: 26,HCNC,, | Performed by: RADIOLOGY

## 2023-04-03 PROCEDURE — 1160F PR REVIEW ALL MEDS BY PRESCRIBER/CLIN PHARMACIST DOCUMENTED: ICD-10-PCS | Mod: HCNC,CPTII,S$GLB, | Performed by: NURSE PRACTITIONER

## 2023-04-03 PROCEDURE — 72040 X-RAY EXAM NECK SPINE 2-3 VW: CPT | Mod: 26,HCNC,, | Performed by: RADIOLOGY

## 2023-04-03 PROCEDURE — 72040 X-RAY EXAM NECK SPINE 2-3 VW: CPT | Mod: TC,HCNC,PO

## 2023-04-03 PROCEDURE — 1160F RVW MEDS BY RX/DR IN RCRD: CPT | Mod: HCNC,CPTII,S$GLB, | Performed by: NURSE PRACTITIONER

## 2023-04-03 PROCEDURE — 1159F PR MEDICATION LIST DOCUMENTED IN MEDICAL RECORD: ICD-10-PCS | Mod: HCNC,CPTII,S$GLB, | Performed by: NURSE PRACTITIONER

## 2023-04-03 PROCEDURE — 3078F PR MOST RECENT DIASTOLIC BLOOD PRESSURE < 80 MM HG: ICD-10-PCS | Mod: HCNC,CPTII,S$GLB, | Performed by: NURSE PRACTITIONER

## 2023-04-03 PROCEDURE — 99999 PR PBB SHADOW E&M-EST. PATIENT-LVL V: ICD-10-PCS | Mod: PBBFAC,HCNC,, | Performed by: NURSE PRACTITIONER

## 2023-04-03 PROCEDURE — 1126F AMNT PAIN NOTED NONE PRSNT: CPT | Mod: HCNC,CPTII,S$GLB, | Performed by: NURSE PRACTITIONER

## 2023-04-03 PROCEDURE — 3008F PR BODY MASS INDEX (BMI) DOCUMENTED: ICD-10-PCS | Mod: HCNC,CPTII,S$GLB, | Performed by: NURSE PRACTITIONER

## 2023-04-03 PROCEDURE — 99214 PR OFFICE/OUTPT VISIT, EST, LEVL IV, 30-39 MIN: ICD-10-PCS | Mod: HCNC,S$GLB,, | Performed by: NURSE PRACTITIONER

## 2023-04-03 PROCEDURE — 3074F SYST BP LT 130 MM HG: CPT | Mod: HCNC,CPTII,S$GLB, | Performed by: NURSE PRACTITIONER

## 2023-04-03 PROCEDURE — 3078F DIAST BP <80 MM HG: CPT | Mod: HCNC,CPTII,S$GLB, | Performed by: NURSE PRACTITIONER

## 2023-04-03 RX ORDER — CELECOXIB 200 MG/1
200 CAPSULE ORAL 2 TIMES DAILY PRN
Qty: 30 CAPSULE | Refills: 5 | Status: SHIPPED | OUTPATIENT
Start: 2023-04-03 | End: 2023-04-11

## 2023-04-03 RX ORDER — ALPRAZOLAM 1 MG/1
1 TABLET ORAL 3 TIMES DAILY
Qty: 90 TABLET | Refills: 5 | Status: SHIPPED | OUTPATIENT
Start: 2023-04-14 | End: 2023-08-22 | Stop reason: SDUPTHER

## 2023-04-03 RX ORDER — TERAZOSIN 5 MG/1
CAPSULE ORAL
COMMUNITY
Start: 2023-02-13

## 2023-04-03 RX ORDER — DOCUSATE SODIUM 100 MG/1
100 CAPSULE, LIQUID FILLED ORAL 2 TIMES DAILY PRN
Qty: 60 CAPSULE | Refills: 0 | Status: SHIPPED | OUTPATIENT
Start: 2023-04-03

## 2023-04-03 RX ORDER — TIZANIDINE 2 MG/1
4 TABLET ORAL EVERY 8 HOURS PRN
Qty: 30 TABLET | Refills: 5 | Status: SHIPPED | OUTPATIENT
Start: 2023-04-03 | End: 2023-05-03

## 2023-04-03 NOTE — ASSESSMENT & PLAN NOTE
Chronic. Stable. Taking Xanax as prescribed. Tolerating medication well with no SE reported. Requesting refills.  reviewed. Refill Xanax for six months.  Follow-up in six months.  Discussed condition course and signs and symptoms to expect.  Patient advised of risk and benefits of medication use.  ER precautions.  Call MD or follow-up to clinic if not improving or worsening symptoms.    -discussed anxiety condition course  -discussed SSRI/SNRI as first-line treatment for this condition  -discussed risk of discontinuing this medication without tapering  -patient was educated, advised of side effects, and all questions were answered.  Patient voiced understanding  -patient will follow up routinely and notify us if having any side effects or worsening or persistent symptoms. Denies SI/HI. ER precautions were given.

## 2023-04-03 NOTE — ASSESSMENT & PLAN NOTE
See overview. Trial of Colace. Follow up with GI. Recommend updating routine colonoscopy.     Please be advised constipation condition course. I recommend increase daily water intake to an acceptable level.  Increase fiber.  Recommend stool softener and laxative if needed. Goal of 1 bowel movement daily.  Follow-up to clinic or notify me if no improvement or symptoms are persistent or worsening.  ER precautions were given.  Recommend daily exercise as tolerated, adequate water intake (six 8-oz glasses of water daily), and high fiber diet. OTC fiber supplements are recommended if diet does not reach daily fiber goal (20-30 grams daily), such as Metamucil, Citrucel, or FiberCon (take as directed, separate from other oral medications by >2 hours).  - Continue OTC stool softener such as Colace as directed to avoid hard stools and straining with bowel movements PRN  -If still no improvement with these measures, call/follow-up.

## 2023-04-03 NOTE — ASSESSMENT & PLAN NOTE
See overview. Obtain imaging today. Trial of Celebrex and Zanaflex. Discussed physical therapy; patient is agreeable. External referral placed per patient request. Discussed condition course and signs and symptoms to expect. ER precautions.  Call MD or follow-up to clinic if not improving or worsening symptoms.

## 2023-04-03 NOTE — PROGRESS NOTES
Assessment/Plan:  Problem List Items Addressed This Visit          Psychiatric    Anxiety (Chronic)    Overview     Chronic.  Stable.  Patient on chronic benzodiazepine from previous PCP.  Transitioning care to me.The patient was checked in the [facility] [employer] [facility] [employer]'s  Prescription Monitoring Program. There appears to be no incongruities with the patient's verbalized history.   No abuse suspected.  February 2021:  Patient had PVCs on cardiac workup.  Patient reports cardiology has adjusted medications that he is doing much better.  Blood pressure has been well controlled.  May 2022:  Patient is here for medication refills.  He continues to have symptoms that are intermittently controlled with medication.           Current Assessment & Plan     Chronic. Stable. Taking Xanax as prescribed. Tolerating medication well with no SE reported. Requesting refills.  reviewed. Refill Xanax for six months.  Follow-up in six months.  Discussed condition course and signs and symptoms to expect.  Patient advised of risk and benefits of medication use.  ER precautions.  Call MD or follow-up to clinic if not improving or worsening symptoms.    -discussed anxiety condition course  -discussed SSRI/SNRI as first-line treatment for this condition  -discussed risk of discontinuing this medication without tapering  -patient was educated, advised of side effects, and all questions were answered.  Patient voiced understanding  -patient will follow up routinely and notify us if having any side effects or worsening or persistent symptoms. Denies SI/HI. ER precautions were given.         Relevant Medications    ALPRAZolam (XANAX) 1 MG tablet (Start on 4/14/2023)       GI    Constipation    Overview     New problem. Worsening over the last few weeks. Bowel movements occur nearly every day. However, BM is difficult. Current Health Habits: Eating fiber? Yes. He is taking OTC fiber supplement. Exercise? Occasional. Water intake?  "Minimal. Patient admits to not drinking enough water. Last colonoscopy 01/2020, poor prep. Discussed. Patient plans to follow up with Dr. Richards.          Current Assessment & Plan     See overview. Trial of Colace. Follow up with GI. Recommend updating routine colonoscopy.     Please be advised constipation condition course. I recommend increase daily water intake to an acceptable level.  Increase fiber.  Recommend stool softener and laxative if needed. Goal of 1 bowel movement daily.  Follow-up to clinic or notify me if no improvement or symptoms are persistent or worsening.  ER precautions were given.  Recommend daily exercise as tolerated, adequate water intake (six 8-oz glasses of water daily), and high fiber diet. OTC fiber supplements are recommended if diet does not reach daily fiber goal (20-30 grams daily), such as Metamucil, Citrucel, or FiberCon (take as directed, separate from other oral medications by >2 hours).  - Continue OTC stool softener such as Colace as directed to avoid hard stools and straining with bowel movements PRN  -If still no improvement with these measures, call/follow-up.          Relevant Medications    docusate sodium (COLACE) 100 MG capsule       Orthopedic    Cervicalgia - Primary    Overview     Chronic problem. Reports onset "years ago". Intermittently flares. Worse to left side and radiates down left shoulder blade. Associated with occasional numbness down LUE. There has been no recent injuries or known trauma. He has had recurrent self limited episodes of neck pain in the past.  Reports no previous evaluation. He states he has tried physical therapy in the past. He has seen a chiropractor. He has tried OTC NSAIDs with no improvement.            Current Assessment & Plan     See overview. Obtain imaging today. Trial of Celebrex and Zanaflex. Discussed physical therapy; patient is agreeable. External referral placed per patient request. Discussed condition course and signs and " symptoms to expect. ER precautions.  Call MD or follow-up to clinic if not improving or worsening symptoms.           Relevant Medications    celecoxib (CELEBREX) 200 MG capsule    tiZANidine (ZANAFLEX) 2 MG tablet    Other Relevant Orders    X-Ray Cervical Spine AP And Lateral (Completed)    Ambulatory referral/consult to Physical/Occupational Therapy       Other    Encounter for long-term (current) use of medications (Chronic)    Overview     Initial HPI:  CHRONIC. Stable. Compliant with medications for managed conditions. See medication list. No SE reported. Routine lab analysis is being monitored. Refills were addressed.February 2021:CHRONIC. Stable. Compliant with medications for managed conditions. See medication list. No SE reported. Routine lab analysis is being monitored. Refills were addressed.  October 2021:  Reviewed labs.  May 2022:  Reviewed labs.  Lab Results   Component Value Date    WBC 5.17 10/12/2022    HGB 15.7 10/12/2022    HCT 48.6 10/12/2022    MCV 98 10/12/2022     10/12/2022       Chemistry        Component Value Date/Time     05/27/2022 1129    K 4.8 05/27/2022 1129     05/27/2022 1129    CO2 25 05/27/2022 1129    BUN 18 05/27/2022 1129    CREATININE 0.9 05/27/2022 1129    GLU 94 05/27/2022 1129        Component Value Date/Time    CALCIUM 9.4 05/27/2022 1129    ALKPHOS 103 05/27/2022 1129    AST 24 05/27/2022 1129    ALT 32 05/27/2022 1129    BILITOT 0.5 05/27/2022 1129    ESTGFRAFRICA >60.0 05/27/2022 1129    EGFRNONAA >60.0 05/27/2022 1129        Lab Results   Component Value Date    TSH 1.661 10/22/2021    D3PRHGW 6.5 06/30/2011          Current Assessment & Plan     Complete history and physical was completed today.  Complete and thorough medication reconciliation was performed.  Discussed risks and benefits of medications.  Advised patient on orders and health maintenance.  We discussed old records and old labs if available.  Will request any records not available  "through epic.  Continue current medications listed on your summary sheet.           Relevant Medications    ALPRAZolam (XANAX) 1 MG tablet (Start on 4/14/2023)     Follow up in about 6 months (around 10/3/2023), or if symptoms worsen or fail to improve.  ER precautions for severe or worsening symptoms.     Ciara Winston NP  _____________________________________________________________________________________________________________________________________________________    CC: neck pain     Neck pain: Patient is a 73-year-old male who presents in clinic today as an established patient here for neck pain. This is a chronic problem. Ongoing for "years'. Intermittently flares. Event that precipitate these symptoms: none known. Onset of symptoms several months ago, gradually worsening since that time. Current symptoms are pain, stiffness, and numbness in LUE . Patient has had recurrent self limited episodes of neck pain in the past.  Reports no previous evaluation. He states he has tried physical therapy in the past. He has seen a chiropractor. He has tried OTC NSAIDs with no improvement.     Constipation: Patient complains of constipation. This is an ongoing problem for a few weeks. Stool pattern is described as small and hard. Bowel movements occur nearly every day. However, BM is difficult. Symptoms have been gradually worsening. Current Health Habits: Eating fiber? Yes. He is taking OTC fiber supplement. Exercise? Occasional. Water intake? Minimal. Patient admits to not drinking enough water. Current OTC/RX therapy has been milk of magnesia which was effective. Last colonoscopy 01/2020, poor prep. Discussed. Patient plans to follow up with Dr. Richards.   Past Medical History:  Past Medical History:   Diagnosis Date    Anxiety     BPH (benign prostatic hypertrophy)     DDD (degenerative disc disease), lumbar     Hypertension     GAURANG (obstructive sleep apnea)      Past Surgical History:   Procedure Laterality Date "    JOINT REPLACEMENT       Review of patient's allergies indicates:   Allergen Reactions    No known drug allergies      Social History     Tobacco Use    Smoking status: Never    Smokeless tobacco: Never   Substance Use Topics    Alcohol use: Yes     Alcohol/week: 1.7 standard drinks     Types: 2 Standard drinks or equivalent per week     Comment: socially    Drug use: No     Family History   Problem Relation Age of Onset    Heart disease Mother     Cancer Mother     Heart disease Father     Prostate cancer Brother      Current Outpatient Medications on File Prior to Visit   Medication Sig Dispense Refill    albuterol (PROVENTIL/VENTOLIN HFA) 90 mcg/actuation inhaler Inhale 2 puffs into the lungs every 6 (six) hours as needed for Wheezing. 18 g 1    albuterol (PROVENTIL/VENTOLIN HFA) 90 mcg/actuation inhaler TAKE 2 PUFFS BY MOUTH EVERY 6 HOURS AS NEEDED FOR WHEEZE 54 g 2    alfuzosin (UROXATRAL) 10 mg Tb24       amLODIPine (NORVASC) 10 MG tablet Take 1 tablet (10 mg total) by mouth once daily. 90 tablet 3    buPROPion (WELLBUTRIN XL) 300 MG 24 hr tablet Take 1 tablet (300 mg total) by mouth once daily. 30 tablet 11    co-enzyme Q-10 30 mg capsule Take 30 mg by mouth once daily.       DIINDOLYLMETHANE, BULK, MISC 100 mg by Other route.      dorzolamide-timolol, PF, (COSOPT PF) 2-0.5 % Dpet ophthalmic solution       fish oil-omega-3 fatty acids 300-1,000 mg capsule Take 1 capsule by mouth once daily.       latanoprost 0.005 % ophthalmic solution Place 1 drop into both eyes nightly.      lisinopriL (PRINIVIL,ZESTRIL) 40 MG tablet TAKE 1 TABLET BY MOUTH EVERY DAY 90 tablet 3    magnesium oxide (MAG-OX) 400 mg (241.3 mg magnesium) tablet Take 400 mg by mouth once daily.       tadalafiL (CIALIS) 5 MG tablet Take 5 mg by mouth once daily.      terazosin (HYTRIN) 5 MG capsule Take by mouth.      terbinafine HCL (LAMISIL) 250 mg tablet TAKE 1 TABLET BY MOUTH AS DIRECTED TAKE CONSECUTIVE 10 DAYS OF A MONTH X 3 MONTHS       "testosterone cypionate (DEPOTESTOTERONE CYPIONATE) 100 mg/mL injection Inject into the muscle.      zinc gluconate 50 mg tablet Take 50 mg by mouth.      [DISCONTINUED] ALPRAZolam (XANAX) 1 MG tablet Take 1 tablet (1 mg total) by mouth 3 (three) times daily. 90 tablet 0    furosemide (LASIX) 40 MG tablet TAKE 1 TABLET BY MOUTH DAILY AS NEEDED FOR FLUID RETENSION (Patient not taking: Reported on 4/3/2023) 90 tablet 4    montelukast (SINGULAIR) 10 mg tablet TAKE 1 TABLET BY MOUTH EVERY DAY IN THE EVENING (Patient not taking: Reported on 10/24/2022) 90 tablet 2    tamsulosin (FLOMAX) 0.4 mg Cp24 0.4 mg.       [DISCONTINUED] doxycycline (VIBRAMYCIN) 100 MG Cap Take 1 capsule (100 mg total) by mouth every 12 (twelve) hours. (Patient not taking: Reported on 4/3/2023) 20 capsule 0     No current facility-administered medications on file prior to visit.     Review of Systems   Constitutional:  Negative for appetite change, chills, fatigue and fever.   HENT:  Negative for congestion, rhinorrhea and sore throat.    Eyes:  Negative for visual disturbance.   Respiratory:  Negative for cough and shortness of breath.    Cardiovascular:  Negative for chest pain, palpitations and leg swelling.   Gastrointestinal:  Positive for constipation. Negative for abdominal pain, diarrhea and vomiting.   Genitourinary:  Negative for difficulty urinating, dysuria and hematuria.   Musculoskeletal:  Positive for neck pain and neck stiffness. Negative for arthralgias and myalgias.   Skin:  Negative for rash and wound.   Neurological:  Negative for dizziness and headaches.   Psychiatric/Behavioral:  Negative for behavioral problems. The patient is nervous/anxious.      Vitals:    04/03/23 0918   BP: 104/62   BP Location: Right arm   Pulse: 78   SpO2: 99%   Weight: 97.1 kg (214 lb)   Height: 5' 11" (1.803 m)     Wt Readings from Last 3 Encounters:   04/03/23 97.1 kg (214 lb)   01/27/23 95.3 kg (210 lb 1.6 oz)   10/24/22 98 kg (216 lb 0.8 oz) "     Physical Exam  Vitals reviewed.   Constitutional:       General: He is not in acute distress.     Appearance: Normal appearance. He is not ill-appearing.   HENT:      Head: Normocephalic and atraumatic.      Right Ear: External ear normal.      Left Ear: External ear normal.      Nose: Nose normal.   Eyes:      Extraocular Movements: Extraocular movements intact.      Conjunctiva/sclera: Conjunctivae normal.   Cardiovascular:      Rate and Rhythm: Normal rate.      Heart sounds: Normal heart sounds.   Pulmonary:      Effort: Pulmonary effort is normal. No respiratory distress.      Breath sounds: Normal breath sounds.   Abdominal:      General: Bowel sounds are normal. There is no distension.      Palpations: Abdomen is soft.      Tenderness: There is no abdominal tenderness.   Musculoskeletal:         General: Normal range of motion.      Cervical back: Normal range of motion. Pain with movement and muscular tenderness present.   Skin:     General: Skin is warm and dry.      Capillary Refill: Capillary refill takes less than 2 seconds.      Coloration: Skin is not pale.      Findings: No rash.   Neurological:      General: No focal deficit present.      Mental Status: He is alert and oriented to person, place, and time. Mental status is at baseline.      Motor: No weakness.      Gait: Gait normal.   Psychiatric:         Mood and Affect: Mood is anxious.         Speech: Speech normal. Speech is not rapid and pressured, delayed or slurred.         Behavior: Behavior normal. Behavior is not agitated, slowed, aggressive, withdrawn, hyperactive or combative. Behavior is cooperative.         Thought Content: Thought content normal.         Judgment: Judgment normal.     Health Maintenance   Topic Date Due    TETANUS VACCINE  10/24/2023 (Originally 3/29/1968)    Lipid Panel  05/27/2023    PROSTATE-SPECIFIC ANTIGEN  10/12/2023    Hepatitis C Screening  Completed

## 2023-04-11 ENCOUNTER — TELEPHONE (OUTPATIENT)
Dept: FAMILY MEDICINE | Facility: CLINIC | Age: 73
End: 2023-04-11
Payer: MEDICARE

## 2023-04-11 DIAGNOSIS — M17.11 OSTEOARTHRITIS OF RIGHT KNEE, UNSPECIFIED OSTEOARTHRITIS TYPE: Primary | ICD-10-CM

## 2023-04-11 RX ORDER — DICLOFENAC SODIUM 75 MG/1
75 TABLET, DELAYED RELEASE ORAL 2 TIMES DAILY
Qty: 60 TABLET | Refills: 0 | Status: SHIPPED | OUTPATIENT
Start: 2023-04-11 | End: 2023-04-11

## 2023-04-11 RX ORDER — IBUPROFEN 800 MG/1
800 TABLET ORAL EVERY 8 HOURS PRN
Qty: 30 TABLET | Refills: 0 | Status: SHIPPED | OUTPATIENT
Start: 2023-04-11 | End: 2023-04-21

## 2023-04-11 NOTE — TELEPHONE ENCOUNTER
"Spoke to pt over the phone, pt states that " I was prescribed celebrex but this is not helping at all with my pian . I was wondering if dr hurd can prescribe something  else . I been taking Ibuprofen and that is the only thing that marjorie seems to work but not for lung " please advise   "

## 2023-04-11 NOTE — TELEPHONE ENCOUNTER
I have signed for the following orders AND/OR meds.  Please call the patient and ask the patient to schedule the testing AND/OR inform about any medications that were sent.      No orders of the defined types were placed in this encounter.      Medications Ordered This Encounter   Medications    diclofenac (VOLTAREN) 75 MG EC tablet     Sig: Take 1 tablet (75 mg total) by mouth 2 (two) times daily.     Dispense:  60 tablet     Refill:  0

## 2023-04-11 NOTE — TELEPHONE ENCOUNTER
"Pt states " I do not need another anti inflammatory , I need something for pain .I have severe back pain , pt help but not for long " please advise   "

## 2023-04-11 NOTE — TELEPHONE ENCOUNTER
Called pt to set up an appointment per your recommendations. Patient explain that he feels that ibuprofen works better than the diclofenac and celebrex that was prescribed. Although I explained to him these medications ultimately the same thing, he went on about the ibuprofen. I advise the patient to stop the anti inflammatory medications that were prescribed and take the ibuprofen if that is what he feels helps his pain. He agreed to this plan and stated he will keep us updated. Is this plan okay with you?

## 2023-04-11 NOTE — TELEPHONE ENCOUNTER
----- Message from Pallavi Tellez sent at 4/11/2023 12:28 PM CDT -----  Contact: Andrés Fortune is calling in regards to a pain medication to help his pain in his shoulder because what was given is not working.Please call back [phone]            Thanks  [place]

## 2023-05-25 ENCOUNTER — TELEPHONE (OUTPATIENT)
Dept: SLEEP MEDICINE | Facility: CLINIC | Age: 73
End: 2023-05-25
Payer: MEDICARE

## 2023-05-25 NOTE — TELEPHONE ENCOUNTER
----- Message from Kristina Rock sent at 5/25/2023 11:02 AM CDT -----  Regarding: Update  Contact: Dennie with  [facility]  Name of Who is Calling: Dennie with  [facility]              What is the request in detail:  She states she is requesting a call back from staff in regards to an update on if a fax was received  in regards to this pt. Please advise   Ref# [id number]  Fax#[phone]      Can the clinic reply by [facility]:No           [phone]

## 2023-06-01 ENCOUNTER — TELEPHONE (OUTPATIENT)
Dept: SLEEP MEDICINE | Facility: CLINIC | Age: 73
End: 2023-06-01
Payer: MEDICARE

## 2023-06-01 NOTE — TELEPHONE ENCOUNTER
----- Message from Lilian Mckenna sent at 6/1/2023 12:16 PM CDT -----  Name of Who is Calling: NACHO PATRICK [[id number]]            What is the request in detail: Patient is requesting call back to fax over summary of visits from march 11-may 16              Can the clinic reply by [facility]: no              What [phone] fax [phone]  Ref number [id number]

## 2023-08-18 ENCOUNTER — PATIENT MESSAGE (OUTPATIENT)
Dept: ADMINISTRATIVE | Facility: HOSPITAL | Age: 73
End: 2023-08-18
Payer: MEDICARE

## 2023-08-22 ENCOUNTER — OFFICE VISIT (OUTPATIENT)
Dept: FAMILY MEDICINE | Facility: CLINIC | Age: 73
End: 2023-08-22
Payer: MEDICARE

## 2023-08-22 VITALS
OXYGEN SATURATION: 94 % | HEIGHT: 71 IN | DIASTOLIC BLOOD PRESSURE: 60 MMHG | WEIGHT: 225.19 LBS | BODY MASS INDEX: 31.52 KG/M2 | HEART RATE: 78 BPM | SYSTOLIC BLOOD PRESSURE: 100 MMHG

## 2023-08-22 DIAGNOSIS — M89.8X5 BONE ISLAND OF RIGHT FEMUR: ICD-10-CM

## 2023-08-22 DIAGNOSIS — Z79.899 ENCOUNTER FOR LONG-TERM (CURRENT) USE OF MEDICATIONS: ICD-10-CM

## 2023-08-22 DIAGNOSIS — N40.1 BENIGN PROSTATIC HYPERPLASIA WITH LOWER URINARY TRACT SYMPTOMS, SYMPTOM DETAILS UNSPECIFIED: Primary | ICD-10-CM

## 2023-08-22 DIAGNOSIS — M51.36 DDD (DEGENERATIVE DISC DISEASE), LUMBAR: ICD-10-CM

## 2023-08-22 DIAGNOSIS — F13.20 BENZODIAZEPINE DEPENDENCE, CONTINUOUS: ICD-10-CM

## 2023-08-22 DIAGNOSIS — K40.20 BILATERAL INGUINAL HERNIA WITHOUT OBSTRUCTION OR GANGRENE, RECURRENCE NOT SPECIFIED: ICD-10-CM

## 2023-08-22 DIAGNOSIS — F41.9 ANXIETY: Chronic | ICD-10-CM

## 2023-08-22 DIAGNOSIS — M89.9 BONE LESION: ICD-10-CM

## 2023-08-22 PROBLEM — M51.369 DDD (DEGENERATIVE DISC DISEASE), LUMBAR: Chronic | Status: ACTIVE | Noted: 2023-08-22

## 2023-08-22 PROBLEM — R53.83 DECREASED ENERGY: Status: ACTIVE | Noted: 2023-06-19

## 2023-08-22 PROBLEM — M51.369 DDD (DEGENERATIVE DISC DISEASE), LUMBAR: Status: ACTIVE | Noted: 2023-08-22

## 2023-08-22 PROCEDURE — 1160F RVW MEDS BY RX/DR IN RCRD: CPT | Mod: HCNC,CPTII,S$GLB, | Performed by: FAMILY MEDICINE

## 2023-08-22 PROCEDURE — 1126F PR PAIN SEVERITY QUANTIFIED, NO PAIN PRESENT: ICD-10-PCS | Mod: HCNC,CPTII,S$GLB, | Performed by: FAMILY MEDICINE

## 2023-08-22 PROCEDURE — 3008F PR BODY MASS INDEX (BMI) DOCUMENTED: ICD-10-PCS | Mod: HCNC,CPTII,S$GLB, | Performed by: FAMILY MEDICINE

## 2023-08-22 PROCEDURE — 3288F PR FALLS RISK ASSESSMENT DOCUMENTED: ICD-10-PCS | Mod: HCNC,CPTII,S$GLB, | Performed by: FAMILY MEDICINE

## 2023-08-22 PROCEDURE — 99214 OFFICE O/P EST MOD 30 MIN: CPT | Mod: HCNC,S$GLB,, | Performed by: FAMILY MEDICINE

## 2023-08-22 PROCEDURE — 1159F MED LIST DOCD IN RCRD: CPT | Mod: HCNC,CPTII,S$GLB, | Performed by: FAMILY MEDICINE

## 2023-08-22 PROCEDURE — 3074F PR MOST RECENT SYSTOLIC BLOOD PRESSURE < 130 MM HG: ICD-10-PCS | Mod: HCNC,CPTII,S$GLB, | Performed by: FAMILY MEDICINE

## 2023-08-22 PROCEDURE — 3008F BODY MASS INDEX DOCD: CPT | Mod: HCNC,CPTII,S$GLB, | Performed by: FAMILY MEDICINE

## 2023-08-22 PROCEDURE — 99999 PR PBB SHADOW E&M-EST. PATIENT-LVL V: ICD-10-PCS | Mod: PBBFAC,HCNC,, | Performed by: FAMILY MEDICINE

## 2023-08-22 PROCEDURE — 1160F PR REVIEW ALL MEDS BY PRESCRIBER/CLIN PHARMACIST DOCUMENTED: ICD-10-PCS | Mod: HCNC,CPTII,S$GLB, | Performed by: FAMILY MEDICINE

## 2023-08-22 PROCEDURE — 1126F AMNT PAIN NOTED NONE PRSNT: CPT | Mod: HCNC,CPTII,S$GLB, | Performed by: FAMILY MEDICINE

## 2023-08-22 PROCEDURE — 99999 PR PBB SHADOW E&M-EST. PATIENT-LVL V: CPT | Mod: PBBFAC,HCNC,, | Performed by: FAMILY MEDICINE

## 2023-08-22 PROCEDURE — 3078F PR MOST RECENT DIASTOLIC BLOOD PRESSURE < 80 MM HG: ICD-10-PCS | Mod: HCNC,CPTII,S$GLB, | Performed by: FAMILY MEDICINE

## 2023-08-22 PROCEDURE — 3078F DIAST BP <80 MM HG: CPT | Mod: HCNC,CPTII,S$GLB, | Performed by: FAMILY MEDICINE

## 2023-08-22 PROCEDURE — 3288F FALL RISK ASSESSMENT DOCD: CPT | Mod: HCNC,CPTII,S$GLB, | Performed by: FAMILY MEDICINE

## 2023-08-22 PROCEDURE — 1159F PR MEDICATION LIST DOCUMENTED IN MEDICAL RECORD: ICD-10-PCS | Mod: HCNC,CPTII,S$GLB, | Performed by: FAMILY MEDICINE

## 2023-08-22 PROCEDURE — 4010F ACE/ARB THERAPY RXD/TAKEN: CPT | Mod: HCNC,CPTII,S$GLB, | Performed by: FAMILY MEDICINE

## 2023-08-22 PROCEDURE — 1101F PR PT FALLS ASSESS DOC 0-1 FALLS W/OUT INJ PAST YR: ICD-10-PCS | Mod: HCNC,CPTII,S$GLB, | Performed by: FAMILY MEDICINE

## 2023-08-22 PROCEDURE — 3074F SYST BP LT 130 MM HG: CPT | Mod: HCNC,CPTII,S$GLB, | Performed by: FAMILY MEDICINE

## 2023-08-22 PROCEDURE — 99214 PR OFFICE/OUTPT VISIT, EST, LEVL IV, 30-39 MIN: ICD-10-PCS | Mod: HCNC,S$GLB,, | Performed by: FAMILY MEDICINE

## 2023-08-22 PROCEDURE — 1101F PT FALLS ASSESS-DOCD LE1/YR: CPT | Mod: HCNC,CPTII,S$GLB, | Performed by: FAMILY MEDICINE

## 2023-08-22 PROCEDURE — 4010F PR ACE/ARB THEARPY RXD/TAKEN: ICD-10-PCS | Mod: HCNC,CPTII,S$GLB, | Performed by: FAMILY MEDICINE

## 2023-08-22 RX ORDER — PNV NO.95/FERROUS FUM/FOLIC AC 28MG-0.8MG
100 TABLET ORAL
COMMUNITY

## 2023-08-22 RX ORDER — ALPRAZOLAM 1 MG/1
1 TABLET ORAL 3 TIMES DAILY
Qty: 90 TABLET | Refills: 5 | Status: SHIPPED | OUTPATIENT
Start: 2023-08-22 | End: 2024-01-05 | Stop reason: SDUPTHER

## 2023-08-22 RX ORDER — VIT C/E/ZN/COPPR/LUTEIN/ZEAXAN 250MG-90MG
1000 CAPSULE ORAL
COMMUNITY

## 2023-08-22 NOTE — PROGRESS NOTES
PLAN:      Problem List Items Addressed This Visit       Benign prostatic hyperplasia with lower urinary tract symptoms - Primary (Chronic)     Reassurance provided.  Patient following with urology.  Patient states may have prostate biopsy due to abnormal blood test.         Anxiety (Chronic)     Chronic. Stable. Taking Xanax as prescribed. Tolerating medication well with no SE reported. Requesting refills.  reviewed. Refill Xanax for six months.  Follow-up in six months.  Discussed condition course and signs and symptoms to expect.  Patient advised of risk and benefits of medication use.  ER precautions.  Call MD or follow-up to clinic if not improving or worsening symptoms.    -discussed anxiety condition course  -discussed SSRI/SNRI as first-line treatment for this condition  -discussed risk of discontinuing this medication without tapering  -patient was educated, advised of side effects, and all questions were answered.  Patient voiced understanding  -patient will follow up routinely and notify us if having any side effects or worsening or persistent symptoms. Denies SI/HI. ER precautions were given.         Relevant Medications    ALPRAZolam (XANAX) 1 MG tablet    Encounter for long-term (current) use of medications (Chronic)     Complete history and physical was completed today.  Complete and thorough medication reconciliation was performed.  Discussed risks and benefits of medications.  Advised patient on orders and health maintenance.  We discussed old records and old labs if available.  Will request any records not available through epic.  Continue current medications listed on your summary sheet.           Relevant Medications    ALPRAZolam (XANAX) 1 MG tablet    Benzodiazepine dependence, continuous (Chronic)     Stable condition.  Refilling medication.         DDD (degenerative disc disease), lumbar (Chronic)     Patient reports he has been to physical therapy for this condition in the past.  Patient  reports he is physically active and occasionally will have flare-ups.  He does not currently have a specialist for this condition.    Avoid heavy lifting.  Physical therapy as needed.         Bilateral inguinal hernia without obstruction or gangrene (Chronic)     Reassurance provided.  Avoid heavy lifting.  Follow-up if any symptoms occur.         Bone lesion     Abnormal bone lesions seen on MRI.  Referral to Orthopedics for further evaluation and treatment.         Relevant Orders    Ambulatory referral/consult to Orthopedics    Bone island of right femur    Relevant Orders    Ambulatory referral/consult to Orthopedics     Future Appointments       Date Provider Specialty Appt Notes    8/31/2023 Bj Lock MD Cardiology 7 month Follow up    10/3/2023 Pedro Pablo Dc MD Family Medicine 6 month f/u            Medication Management for assessment above:   Medication List with Changes/Refills   Current Medications    ALBUTEROL (PROVENTIL/VENTOLIN HFA) 90 MCG/ACTUATION INHALER    Inhale 2 puffs into the lungs every 6 (six) hours as needed for Wheezing.    ALBUTEROL (PROVENTIL/VENTOLIN HFA) 90 MCG/ACTUATION INHALER    TAKE 2 PUFFS BY MOUTH EVERY 6 HOURS AS NEEDED FOR WHEEZE    ALFUZOSIN (UROXATRAL) 10 MG TB24        AMLODIPINE (NORVASC) 10 MG TABLET    Take 1 tablet (10 mg total) by mouth once daily.    BUPROPION (WELLBUTRIN XL) 300 MG 24 HR TABLET    Take 1 tablet (300 mg total) by mouth once daily.    CHOLECALCIFEROL, VITAMIN D3, (VITAMIN D3) 25 MCG (1,000 UNIT) CAPSULE    Take 1,000 Units by mouth.    CO-ENZYME Q-10 30 MG CAPSULE    Take 30 mg by mouth once daily.     CYANOCOBALAMIN (VITAMIN B-12) 100 MCG TABLET    Take 100 mcg by mouth.    DIINDOLYLMETHANE, BULK, MISC    100 mg by Other route.    DOCUSATE SODIUM (COLACE) 100 MG CAPSULE    Take 1 capsule (100 mg total) by mouth 2 (two) times daily as needed for Constipation.    DORZOLAMIDE-TIMOLOL, PF, (COSOPT PF) 2-0.5 % DPET OPHTHALMIC SOLUTION         FISH OIL-OMEGA-3 FATTY ACIDS 300-1,000 MG CAPSULE    Take 1 capsule by mouth once daily.     FUROSEMIDE (LASIX) 40 MG TABLET    TAKE 1 TABLET BY MOUTH DAILY AS NEEDED FOR FLUID RETENSION    LATANOPROST 0.005 % OPHTHALMIC SOLUTION    Place 1 drop into both eyes nightly.    LISINOPRIL (PRINIVIL,ZESTRIL) 40 MG TABLET    TAKE 1 TABLET BY MOUTH EVERY DAY    MAGNESIUM OXIDE (MAG-OX) 400 MG (241.3 MG MAGNESIUM) TABLET    Take 400 mg by mouth once daily.     MONTELUKAST (SINGULAIR) 10 MG TABLET    TAKE 1 TABLET BY MOUTH EVERY DAY IN THE EVENING    TADALAFIL (CIALIS) 5 MG TABLET    Take 5 mg by mouth once daily.    TAMSULOSIN (FLOMAX) 0.4 MG CP24    0.4 mg.     TERAZOSIN (HYTRIN) 5 MG CAPSULE    Take by mouth.    TERBINAFINE HCL (LAMISIL) 250 MG TABLET    TAKE 1 TABLET BY MOUTH AS DIRECTED TAKE CONSECUTIVE 10 DAYS OF A MONTH X 3 MONTHS    TESTOSTERONE CYPIONATE (DEPOTESTOTERONE CYPIONATE) 100 MG/ML INJECTION    Inject into the muscle.    ZINC GLUCONATE 50 MG TABLET    Take 50 mg by mouth.   Changed and/or Refilled Medications    Modified Medication Previous Medication    ALPRAZOLAM (XANAX) 1 MG TABLET ALPRAZolam (XANAX) 1 MG tablet       Take 1 tablet (1 mg total) by mouth 3 (three) times daily.    Take 1 tablet (1 mg total) by mouth 3 (three) times daily.       Pedro Pablo Dc M.D.  ==========================================================================  Subjective:   Patient ID: Andrés Casillas is a 73 y.o. male.  has a past medical history of Anxiety, BPH (benign prostatic hypertrophy), DDD (degenerative disc disease), lumbar, Hypertension, and GAURANG (obstructive sleep apnea).   Chief Complaint: Follow-up (From MRI)      Problem List Items Addressed This Visit       Benign prostatic hyperplasia with lower urinary tract symptoms - Primary (Chronic)    Overview     Chronic.  Intermittent control.  Patient following with urology.      REASON FOR EXAM: [R97.20]-Elevated prostate specific antigen (PSA) /  [Z12.5]-Encounter for screening for malignant neoplasm of prostate / Enlarged prostate     TECHNICAL FACTORS: Multiplanar multisequence MRI of the pelvis with attention to the prostate precontrast.  Dynamic postcontrast axial T1 images were obtained and [employer] software was used for post processing and analysis. ProFuse image mapping was not   performed.     DOSE: 20  mL ProHance IV     ADDITIONAL CLINICAL INFORMATION:     PSA:   3.0 ng/mL June 2023, 2.2 ng/mL October 2022, 3.4 ng/mL May 2022       Prostate biopsy:   None available     COMPARISON: None     FINDINGS:   Prostate size: 43  cc   Intravesical prostate protrusion: None   Post biopsy or other hemorrhage: None       PERIPHERAL ZONE:   Index lesion(s) location and size: No index lesion     T2W appearance: Heterogeneous T2 signal in the peripheral zone, particularly at the mid gland level.     DWI appearance: No abnormal restricted diffusion     Dynamic Contrast: No early or pathological enhancement     Composite PI-RADS: 2       TRANSITION ZONE:   Index lesion(s) location and size: No index lesion     T2W appearance: Mild T2 heterogeneity and scattered BPH nodules   DWI appearance: No abnormal restricted diffusion   Dynamic Contrast: No early or pathological enhancement   Composite PI-RADS: 2     ADDITIONAL FINDINGS:   Prostate: None   Seminal vesicles: None   Lymphadenopathy: No significant lymphadenopathy   Osseous: Degenerative changes of the lower lumbar spine and sacroiliac joints. Nonspecific T1 low signal 2.5 cm lesion in the right femoral head. This could indicate a sclerotic bone island.   Abdomen/Pelvis: Fat-containing bilateral inguinal hernias. Fairly extensive colonic diverticulosis.     IMPRESSION:    1.  PIRADS 2 - Low (clinically significant cancer is unlikely to be present).    2.  Changes of mild BPH.   3. Fairly extensive distal colonic diverticulosis.       PIRADS ASSESSMENT CATEGORIES:   PIRADS 1-Very Low (clinically significant cancer  is highly unlikely)   PIRADS 2-Low (clinically significant cancer is unlikely)   PIRADS 3-Intermediate (the presence of clinically significant cancer is equivocal)   PIRADS 4-High (clinically significant cancer is likely)   PIRADS 5-Very High (clinically significant cancer is highly likely)     Electronically signed by Piotr Mann MD on 8/1/2023 1:49 PM           Current Assessment & Plan     Reassurance provided.  Patient following with urology.  Patient states may have prostate biopsy due to abnormal blood test.         Anxiety (Chronic)    Overview     Chronic.  August 2023: Patient here for medication refill.  Reports compliance.  No side effects reported.  Symptoms are controlled.  Stable.  Patient on chronic benzodiazepine from previous PCP.  Transitioning care to me.The patient was checked in the [facility] [employer] [facility] [employer]'s  Prescription Monitoring Program. There appears to be no incongruities with the patient's verbalized history.   No abuse suspected.  February 2021:  Patient had PVCs on cardiac workup.  Patient reports cardiology has adjusted medications that he is doing much better.  Blood pressure has been well controlled.  May 2022:  Patient is here for medication refills.  He continues to have symptoms that are intermittently controlled with medication.         Current Assessment & Plan     Chronic. Stable. Taking Xanax as prescribed. Tolerating medication well with no SE reported. Requesting refills.  reviewed. Refill Xanax for six months.  Follow-up in six months.  Discussed condition course and signs and symptoms to expect.  Patient advised of risk and benefits of medication use.  ER precautions.  Call MD or follow-up to clinic if not improving or worsening symptoms.    -discussed anxiety condition course  -discussed SSRI/SNRI as first-line treatment for this condition  -discussed risk of discontinuing this medication without tapering  -patient was educated, advised of side effects, and  all questions were answered.  Patient voiced understanding  -patient will follow up routinely and notify us if having any side effects or worsening or persistent symptoms. Denies SI/HI. ER precautions were given.         Encounter for long-term (current) use of medications (Chronic)    Overview     August 2023: Reviewed labs.  Initial HPI:  CHRONIC. Stable. Compliant with medications for managed conditions. See medication list. No SE reported. Routine lab analysis is being monitored. Refills were addressed.February 2021:CHRONIC. Stable. Compliant with medications for managed conditions. See medication list. No SE reported. Routine lab analysis is being monitored. Refills were addressed.  October 2021:  Reviewed labs.  May 2022:  Reviewed labs.  Lab Results   Component Value Date    WBC 5.17 10/12/2022    HGB 15.7 10/12/2022    HCT 48.6 10/12/2022    MCV 98 10/12/2022     10/12/2022       Chemistry        Component Value Date/Time     05/27/2022 1129    K 4.8 05/27/2022 1129     05/27/2022 1129    CO2 25 05/27/2022 1129    BUN 18 05/27/2022 1129    CREATININE 0.9 05/27/2022 1129    GLU 94 05/27/2022 1129        Component Value Date/Time    CALCIUM 9.4 05/27/2022 1129    ALKPHOS 103 05/27/2022 1129    AST 24 05/27/2022 1129    ALT 32 05/27/2022 1129    BILITOT 0.5 05/27/2022 1129    ESTGFRAFRICA >60.0 05/27/2022 1129    EGFRNONAA >60.0 05/27/2022 1129        Lab Results   Component Value Date    TSH 1.661 10/22/2021    M4SRHPV 6.5 06/30/2011          Current Assessment & Plan     Complete history and physical was completed today.  Complete and thorough medication reconciliation was performed.  Discussed risks and benefits of medications.  Advised patient on orders and health maintenance.  We discussed old records and old labs if available.  Will request any records not available through epic.  Continue current medications listed on your summary sheet.           Benzodiazepine dependence, continuous  (Chronic)    Overview     Chronic.  Stable.   reviewed.  On long-term Xanax as needed for panic attacks and anxiety         Current Assessment & Plan     Stable condition.  Refilling medication.         DDD (degenerative disc disease), lumbar (Chronic)    Overview     REASON FOR EXAM: [R97.20]-Elevated prostate specific antigen (PSA) / [Z12.5]-Encounter for screening for malignant neoplasm of prostate / Enlarged prostate     TECHNICAL FACTORS: Multiplanar multisequence MRI of the pelvis with attention to the prostate precontrast.  Dynamic postcontrast axial T1 images were obtained and [employer] software was used for post processing and analysis. ProFuse image mapping was not   performed.     DOSE: 20  mL ProHance IV     ADDITIONAL CLINICAL INFORMATION:     PSA:   3.0 ng/mL June 2023, 2.2 ng/mL October 2022, 3.4 ng/mL May 2022       Prostate biopsy:   None available     COMPARISON: None     FINDINGS:   Prostate size: 43  cc   Intravesical prostate protrusion: None   Post biopsy or other hemorrhage: None       PERIPHERAL ZONE:   Index lesion(s) location and size: No index lesion     T2W appearance: Heterogeneous T2 signal in the peripheral zone, particularly at the mid gland level.     DWI appearance: No abnormal restricted diffusion     Dynamic Contrast: No early or pathological enhancement     Composite PI-RADS: 2       TRANSITION ZONE:   Index lesion(s) location and size: No index lesion     T2W appearance: Mild T2 heterogeneity and scattered BPH nodules   DWI appearance: No abnormal restricted diffusion   Dynamic Contrast: No early or pathological enhancement   Composite PI-RADS: 2     ADDITIONAL FINDINGS:   Prostate: None   Seminal vesicles: None   Lymphadenopathy: No significant lymphadenopathy   Osseous: Degenerative changes of the lower lumbar spine and sacroiliac joints. Nonspecific T1 low signal 2.5 cm lesion in the right femoral head. This could indicate a sclerotic bone island.   Abdomen/Pelvis:  Fat-containing bilateral inguinal hernias. Fairly extensive colonic diverticulosis.     IMPRESSION:    1.  PIRADS 2 - Low (clinically significant cancer is unlikely to be present).    2.  Changes of mild BPH.   3. Fairly extensive distal colonic diverticulosis.       PIRADS ASSESSMENT CATEGORIES:   PIRADS 1-Very Low (clinically significant cancer is highly unlikely)   PIRADS 2-Low (clinically significant cancer is unlikely)   PIRADS 3-Intermediate (the presence of clinically significant cancer is equivocal)   PIRADS 4-High (clinically significant cancer is likely)   PIRADS 5-Very High (clinically significant cancer is highly likely)     Electronically signed by Piotr Mann MD on 8/1/2023 1:49 PM           Current Assessment & Plan     Patient reports he has been to physical therapy for this condition in the past.  Patient reports he is physically active and occasionally will have flare-ups.  He does not currently have a specialist for this condition.    Avoid heavy lifting.  Physical therapy as needed.         Bilateral inguinal hernia without obstruction or gangrene (Chronic)    Overview     Seen on recent imaging of MRI.  Patient not having any issues with this condition currently.         Current Assessment & Plan     Reassurance provided.  Avoid heavy lifting.  Follow-up if any symptoms occur.         Bone lesion    Overview     REASON FOR EXAM: [R97.20]-Elevated prostate specific antigen (PSA) / [Z12.5]-Encounter for screening for malignant neoplasm of prostate / Enlarged prostate     TECHNICAL FACTORS: Multiplanar multisequence MRI of the pelvis with attention to the prostate precontrast.  Dynamic postcontrast axial T1 images were obtained and [employer] software was used for post processing and analysis. ProFuse image mapping was not   performed.     DOSE: 20  mL ProHance IV     ADDITIONAL CLINICAL INFORMATION:     PSA:   3.0 ng/mL June 2023, 2.2 ng/mL October 2022, 3.4 ng/mL May 2022       Prostate biopsy:    None available     COMPARISON: None     FINDINGS:   Prostate size: 43  cc   Intravesical prostate protrusion: None   Post biopsy or other hemorrhage: None       PERIPHERAL ZONE:   Index lesion(s) location and size: No index lesion     T2W appearance: Heterogeneous T2 signal in the peripheral zone, particularly at the mid gland level.     DWI appearance: No abnormal restricted diffusion     Dynamic Contrast: No early or pathological enhancement     Composite PI-RADS: 2       TRANSITION ZONE:   Index lesion(s) location and size: No index lesion     T2W appearance: Mild T2 heterogeneity and scattered BPH nodules   DWI appearance: No abnormal restricted diffusion   Dynamic Contrast: No early or pathological enhancement   Composite PI-RADS: 2     ADDITIONAL FINDINGS:   Prostate: None   Seminal vesicles: None   Lymphadenopathy: No significant lymphadenopathy   Osseous: Degenerative changes of the lower lumbar spine and sacroiliac joints. Nonspecific T1 low signal 2.5 cm lesion in the right femoral head. This could indicate a sclerotic bone island.   Abdomen/Pelvis: Fat-containing bilateral inguinal hernias. Fairly extensive colonic diverticulosis.     IMPRESSION:    1.  PIRADS 2 - Low (clinically significant cancer is unlikely to be present).    2.  Changes of mild BPH.   3. Fairly extensive distal colonic diverticulosis.       PIRADS ASSESSMENT CATEGORIES:   PIRADS 1-Very Low (clinically significant cancer is highly unlikely)   PIRADS 2-Low (clinically significant cancer is unlikely)   PIRADS 3-Intermediate (the presence of clinically significant cancer is equivocal)   PIRADS 4-High (clinically significant cancer is likely)   PIRADS 5-Very High (clinically significant cancer is highly likely)     Electronically signed by Piotr Mann MD on 8/1/2023 1:49 PM           Current Assessment & Plan     Abnormal bone lesions seen on MRI.  Referral to Orthopedics for further evaluation and treatment.         Bone island of  right femur        Review of patient's allergies indicates:   Allergen Reactions    No known drug allergies      Current Outpatient Medications   Medication Instructions    albuterol (PROVENTIL/VENTOLIN HFA) 90 mcg/actuation inhaler 2 puffs, Inhalation, Every 6 hours PRN    albuterol (PROVENTIL/VENTOLIN HFA) 90 mcg/actuation inhaler TAKE 2 PUFFS BY MOUTH EVERY 6 HOURS AS NEEDED FOR WHEEZE    alfuzosin (UROXATRAL) 10 mg Tb24 No dose, route, or frequency recorded.    ALPRAZolam (XANAX) 1 mg, Oral, 3 times daily    amLODIPine (NORVASC) 10 mg, Oral, Daily    buPROPion (WELLBUTRIN XL) 300 mg, Oral, Daily    cholecalciferol (vitamin D3) (VITAMIN D3) 1,000 Units, Oral    co-enzyme Q-10 30 mg, Oral, Daily    cyanocobalamin (VITAMIN B-12) 100 mcg, Oral    DIINDOLYLMETHANE, BULK, MISC 100 mg, Other    docusate sodium (COLACE) 100 mg, Oral, 2 times daily PRN    dorzolamide-timolol, PF, (COSOPT PF) 2-0.5 % Dpet ophthalmic solution No dose, route, or frequency recorded.    fish oil-omega-3 fatty acids 300-1,000 mg capsule 1 capsule, Oral, Daily    furosemide (LASIX) 40 MG tablet TAKE 1 TABLET BY MOUTH DAILY AS NEEDED FOR FLUID RETENSION    latanoprost 0.005 % ophthalmic solution 1 drop, Both Eyes, Nightly    lisinopriL (PRINIVIL,ZESTRIL) 40 MG tablet TAKE 1 TABLET BY MOUTH EVERY DAY    magnesium oxide (MAG-OX) 400 mg, Oral, Daily    montelukast (SINGULAIR) 10 mg tablet TAKE 1 TABLET BY MOUTH EVERY DAY IN THE EVENING    tadalafiL (CIALIS) 5 mg, Oral, Daily    tamsulosin (FLOMAX) 0.4 mg    terazosin (HYTRIN) 5 MG capsule Oral    terbinafine HCL (LAMISIL) 250 mg tablet TAKE 1 TABLET BY MOUTH AS DIRECTED TAKE CONSECUTIVE 10 DAYS OF A MONTH X 3 MONTHS    testosterone cypionate (DEPOTESTOTERONE CYPIONATE) 100 mg/mL injection Intramuscular    zinc gluconate 50 mg, Oral      I have reviewed the PMH, social history, FamilyHx, surgical history, allergies and medications documented / confirmed by the patient at the time of this  "visit.  Review of Systems   Constitutional:  Positive for fatigue. Negative for chills, fever and unexpected weight change.   HENT:  Negative for ear pain and sore throat.    Eyes:  Negative for redness and visual disturbance.   Respiratory:  Negative for cough and shortness of breath.    Cardiovascular:  Negative for chest pain and palpitations.   Gastrointestinal:  Negative for nausea and vomiting.   Endocrine: Negative for cold intolerance and heat intolerance.   Genitourinary:  Negative for difficulty urinating and hematuria.   Musculoskeletal:  Positive for arthralgias. Negative for myalgias.   Skin:  Negative for rash and wound.   Allergic/Immunologic: Negative for environmental allergies and food allergies.   Neurological:  Negative for weakness and headaches.   Hematological:  Negative for adenopathy. Does not bruise/bleed easily.   Psychiatric/Behavioral:  Negative for sleep disturbance. The patient is not nervous/anxious.      Objective:   /60   Pulse 78   Ht 5' 11" (1.803 m)   Wt 102.2 kg (225 lb 3.2 oz)   SpO2 (!) 94%   BMI 31.41 kg/m²   Physical Exam  Vitals and nursing note reviewed.   Constitutional:       General: He is not in acute distress.     Appearance: He is well-developed. He is not diaphoretic.   HENT:      Head: Normocephalic and atraumatic.      Right Ear: External ear normal.      Left Ear: External ear normal.      Nose: Nose normal. No rhinorrhea.   Eyes:      Extraocular Movements: Extraocular movements intact.      Pupils: Pupils are equal, round, and reactive to light.   Cardiovascular:      Rate and Rhythm: Normal rate.      Pulses: Normal pulses.   Pulmonary:      Effort: Pulmonary effort is normal. No respiratory distress.      Breath sounds: Normal breath sounds.   Abdominal:      General: Bowel sounds are normal.      Palpations: Abdomen is soft.   Musculoskeletal:         General: No tenderness or deformity. Normal range of motion.      Cervical back: Normal range of " motion and neck supple.      Right lower leg: No edema.      Left lower leg: No edema.   Skin:     General: Skin is warm and dry.      Capillary Refill: Capillary refill takes less than 2 seconds.      Findings: No rash.   Neurological:      General: No focal deficit present.      Mental Status: He is alert and oriented to person, place, and time. Mental status is at baseline.      Cranial Nerves: No cranial nerve deficit.      Motor: No weakness.      Gait: Gait normal.   Psychiatric:         Attention and Perception: He is attentive.         Mood and Affect: Mood normal. Mood is not anxious or depressed. Affect is not labile, blunt, angry or inappropriate.         Speech: He is communicative. Speech is not rapid and pressured, delayed, slurred or tangential.         Behavior: Behavior normal. Behavior is not agitated, slowed, aggressive, withdrawn, hyperactive or combative.         Thought Content: Thought content normal. Thought content is not paranoid or delusional. Thought content does not include homicidal or suicidal ideation. Thought content does not include homicidal or suicidal plan.         Cognition and Memory: Memory is not impaired.         Judgment: Judgment normal. Judgment is not impulsive or inappropriate.         Assessment:     1. Benign prostatic hyperplasia with lower urinary tract symptoms, symptom details unspecified    2. DDD (degenerative disc disease), lumbar    3. Bilateral inguinal hernia without obstruction or gangrene, recurrence not specified    4. Benzodiazepine dependence, continuous    5. Bone lesion    6. Bone island of right femur    7. Encounter for long-term (current) use of medications    8. Anxiety      MDM:   Moderate medical complexity.  Moderate risk.  Total time: 33 minutes.  This includes total time spent on the encounter, which includes face to face time and non-face to face time preparing to see the patient (eg, review of previous medical records, tests), Obtaining  and/or reviewing separately obtained history, documenting clinical information in the electronic or other health record, independently interpreting results (not separately reported)/communicating results to the patient/family/caregiver, and/or care coordination (not separately reported).    I have Reviewed and summarized old records.  I have performed thorough medication reconciliation today and discussed risk and benefits of medications.  I have reviewed labs and discussed with patient.  All questions were answered.  I am requesting old records and will review them once they are available.  Urology    I have signed for the following orders AND/OR meds.  Orders Placed This Encounter   Procedures    Ambulatory referral/consult to Orthopedics     Standing Status:   Future     Standing Expiration Date:   9/22/2024     Referral Priority:   Urgent     Referral Type:   Consultation     Requested Specialty:   Orthopedic Surgery     Number of Visits Requested:   1     Medications Ordered This Encounter   Medications    ALPRAZolam (XANAX) 1 MG tablet     Sig: Take 1 tablet (1 mg total) by mouth 3 (three) times daily.     Dispense:  90 tablet     Refill:  5     This request is for a new prescription for a controlled substance as required by Federal/State law.        Follow up in about 6 months (around 2/22/2024), or if symptoms worsen or fail to improve, for Med refills.  Future Appointments       Date Provider Specialty Appt Notes    8/31/2023 Bj Lock MD Cardiology 7 month Follow up    10/3/2023 Pedro Pablo Dc MD Family Medicine 6 month f/u           If no improvement in symptoms or symptoms worsen, advised to call/follow-up at clinic or go to ER. Patient voiced understanding and all questions/concerns were addressed.   DISCLAIMER: This note was compiled by using a speech recognition dictation system and therefore please be aware that typographical / speech recognition errors can and do occur.  Please  contact me if you see any errors specifically.    Pedro Pablo Dc M.D.       Office: [phone] [address] [place] [address]  FAX: [phone]

## 2023-08-22 NOTE — ASSESSMENT & PLAN NOTE
Reassurance provided.  Patient following with urology.  Patient states may have prostate biopsy due to abnormal blood test.

## 2023-08-22 NOTE — ASSESSMENT & PLAN NOTE
Patient reports he has been to physical therapy for this condition in the past.  Patient reports he is physically active and occasionally will have flare-ups.  He does not currently have a specialist for this condition.    Avoid heavy lifting.  Physical therapy as needed.

## 2023-08-22 NOTE — PATIENT INSTRUCTIONS
Follow up in about 6 months (around 2/22/2024), or if symptoms worsen or fail to improve, for Med refills.     Dear patient,   As a result of recent federal legislation (The Federal Cures Act), you may receive lab or pathology results from your visit in your [facility] account before your physician is able to contact you. Your physician or their representative will relay the results to you with their recommendations at their soonest availability.     If no improvement in symptoms or symptoms worsen, please be advised to call MD, follow-up at clinic and/or go to ER if becomes severe.    Pedro Pablo Dc M.D.        We Offer TELEHEALTH & Same Day Appointments!   Book your Telehealth appointment with me through my nurse or   Clinic appointments on [employer]!    [address] [place] [address]    Office: [phone]   FAX: [phone]    Check out my Facebook Page and Follow Me at: [email or web address]    Check out my website at [employer] by clicking on: [email or web address]    To Schedule appointments online, go to [employer]: [email or web address]    Calm

## 2023-08-24 DIAGNOSIS — M89.8X5 PAIN IN RIGHT FEMUR: Primary | ICD-10-CM

## 2023-08-25 ENCOUNTER — HOSPITAL ENCOUNTER (OUTPATIENT)
Dept: RADIOLOGY | Facility: HOSPITAL | Age: 73
Discharge: HOME | End: 2023-08-25
Attending: ORTHOPAEDIC SURGERY
Payer: MEDICARE

## 2023-08-25 ENCOUNTER — OFFICE VISIT (OUTPATIENT)
Dept: ORTHOPEDICS | Facility: CLINIC | Age: 73
End: 2023-08-25
Payer: MEDICARE

## 2023-08-25 VITALS — HEIGHT: 71 IN | BODY MASS INDEX: 31.5 KG/M2 | WEIGHT: 225 LBS

## 2023-08-25 DIAGNOSIS — M89.8X5 PAIN IN RIGHT FEMUR: ICD-10-CM

## 2023-08-25 DIAGNOSIS — M25.551 RIGHT HIP PAIN: ICD-10-CM

## 2023-08-25 DIAGNOSIS — M25.551 RIGHT HIP PAIN: Primary | ICD-10-CM

## 2023-08-25 DIAGNOSIS — M89.8X5 PAIN IN RIGHT FEMUR: Primary | ICD-10-CM

## 2023-08-25 PROCEDURE — 99203 OFFICE O/P NEW LOW 30 MIN: CPT | Mod: HCNC,S$GLB,, | Performed by: ORTHOPAEDIC SURGERY

## 2023-08-25 PROCEDURE — 1126F AMNT PAIN NOTED NONE PRSNT: CPT | Mod: HCNC,CPTII,S$GLB, | Performed by: ORTHOPAEDIC SURGERY

## 2023-08-25 PROCEDURE — 3288F FALL RISK ASSESSMENT DOCD: CPT | Mod: HCNC,CPTII,S$GLB, | Performed by: ORTHOPAEDIC SURGERY

## 2023-08-25 PROCEDURE — 1101F PT FALLS ASSESS-DOCD LE1/YR: CPT | Mod: HCNC,CPTII,S$GLB, | Performed by: ORTHOPAEDIC SURGERY

## 2023-08-25 PROCEDURE — 99203 PR OFFICE/OUTPT VISIT, NEW, LEVL III, 30-44 MIN: ICD-10-PCS | Mod: HCNC,S$GLB,, | Performed by: ORTHOPAEDIC SURGERY

## 2023-08-25 PROCEDURE — 3288F PR FALLS RISK ASSESSMENT DOCUMENTED: ICD-10-PCS | Mod: HCNC,CPTII,S$GLB, | Performed by: ORTHOPAEDIC SURGERY

## 2023-08-25 PROCEDURE — 3008F BODY MASS INDEX DOCD: CPT | Mod: HCNC,CPTII,S$GLB, | Performed by: ORTHOPAEDIC SURGERY

## 2023-08-25 PROCEDURE — 73502 X-RAY EXAM HIP UNI 2-3 VIEWS: CPT | Mod: 26,HCNC,RT, | Performed by: RADIOLOGY

## 2023-08-25 PROCEDURE — 1159F PR MEDICATION LIST DOCUMENTED IN MEDICAL RECORD: ICD-10-PCS | Mod: HCNC,CPTII,S$GLB, | Performed by: ORTHOPAEDIC SURGERY

## 2023-08-25 PROCEDURE — 3008F PR BODY MASS INDEX (BMI) DOCUMENTED: ICD-10-PCS | Mod: HCNC,CPTII,S$GLB, | Performed by: ORTHOPAEDIC SURGERY

## 2023-08-25 PROCEDURE — 73502 XR HIP WITH PELVIS WHEN PERFORMED, 2 OR 3  VIEWS RIGHT: ICD-10-PCS | Mod: 26,HCNC,RT, | Performed by: RADIOLOGY

## 2023-08-25 PROCEDURE — 1126F PR PAIN SEVERITY QUANTIFIED, NO PAIN PRESENT: ICD-10-PCS | Mod: HCNC,CPTII,S$GLB, | Performed by: ORTHOPAEDIC SURGERY

## 2023-08-25 PROCEDURE — 1159F MED LIST DOCD IN RCRD: CPT | Mod: HCNC,CPTII,S$GLB, | Performed by: ORTHOPAEDIC SURGERY

## 2023-08-25 PROCEDURE — 4010F ACE/ARB THERAPY RXD/TAKEN: CPT | Mod: HCNC,CPTII,S$GLB, | Performed by: ORTHOPAEDIC SURGERY

## 2023-08-25 PROCEDURE — 99999 PR PBB SHADOW E&M-EST. PATIENT-LVL III: CPT | Mod: PBBFAC,HCNC,, | Performed by: ORTHOPAEDIC SURGERY

## 2023-08-25 PROCEDURE — 4010F PR ACE/ARB THEARPY RXD/TAKEN: ICD-10-PCS | Mod: HCNC,CPTII,S$GLB, | Performed by: ORTHOPAEDIC SURGERY

## 2023-08-25 PROCEDURE — 1101F PR PT FALLS ASSESS DOC 0-1 FALLS W/OUT INJ PAST YR: ICD-10-PCS | Mod: HCNC,CPTII,S$GLB, | Performed by: ORTHOPAEDIC SURGERY

## 2023-08-25 PROCEDURE — 73502 X-RAY EXAM HIP UNI 2-3 VIEWS: CPT | Mod: TC,HCNC,PO,RT

## 2023-08-25 PROCEDURE — 99999 PR PBB SHADOW E&M-EST. PATIENT-LVL III: ICD-10-PCS | Mod: PBBFAC,HCNC,, | Performed by: ORTHOPAEDIC SURGERY

## 2023-08-25 NOTE — PROGRESS NOTES
73 years old recently had MRI of his prostate found to have a benign-appearing lesion on the MRI in the femoral head having no symptoms comes here today    Exam shows full and painless hip range of motion no outward signs of injury infection     X-ray of the hip an MRI of the prostate revealed benign-appearing lesion in the femoral head     Assessment:  [place]one [place] right femoral head benign-appearing lesion     Plan:  Continue the observation repeat x-ray of the right hip in 6 months or sooner if he becomes symptomatic

## 2023-08-30 DIAGNOSIS — I10 PRIMARY HYPERTENSION: Primary | ICD-10-CM

## 2023-08-31 ENCOUNTER — OFFICE VISIT (OUTPATIENT)
Dept: CARDIOLOGY | Facility: CLINIC | Age: 73
End: 2023-08-31
Payer: MEDICARE

## 2023-08-31 ENCOUNTER — HOSPITAL ENCOUNTER (OUTPATIENT)
Dept: CARDIOLOGY | Facility: HOSPITAL | Age: 73
Discharge: HOME | End: 2023-08-31
Attending: INTERNAL MEDICINE
Payer: MEDICARE

## 2023-08-31 VITALS
DIASTOLIC BLOOD PRESSURE: 80 MMHG | SYSTOLIC BLOOD PRESSURE: 134 MMHG | HEART RATE: 84 BPM | WEIGHT: 220.19 LBS | HEIGHT: 71 IN | OXYGEN SATURATION: 98 % | BODY MASS INDEX: 30.83 KG/M2

## 2023-08-31 DIAGNOSIS — F41.9 ANXIETY: ICD-10-CM

## 2023-08-31 DIAGNOSIS — G47.33 OSA (OBSTRUCTIVE SLEEP APNEA): ICD-10-CM

## 2023-08-31 DIAGNOSIS — I45.10 RBBB: ICD-10-CM

## 2023-08-31 DIAGNOSIS — R00.0 TACHYCARDIA: ICD-10-CM

## 2023-08-31 DIAGNOSIS — I10 PRIMARY HYPERTENSION: Primary | ICD-10-CM

## 2023-08-31 DIAGNOSIS — I10 PRIMARY HYPERTENSION: ICD-10-CM

## 2023-08-31 DIAGNOSIS — I10 ESSENTIAL HYPERTENSION: ICD-10-CM

## 2023-08-31 DIAGNOSIS — R94.31 ABNORMAL ECG: ICD-10-CM

## 2023-08-31 DIAGNOSIS — R00.2 PALPITATION: ICD-10-CM

## 2023-08-31 PROCEDURE — 1160F PR REVIEW ALL MEDS BY PRESCRIBER/CLIN PHARMACIST DOCUMENTED: ICD-10-PCS | Mod: HCNC,CPTII,S$GLB, | Performed by: INTERNAL MEDICINE

## 2023-08-31 PROCEDURE — 1101F PT FALLS ASSESS-DOCD LE1/YR: CPT | Mod: HCNC,CPTII,S$GLB, | Performed by: INTERNAL MEDICINE

## 2023-08-31 PROCEDURE — 93010 ELECTROCARDIOGRAM REPORT: CPT | Mod: HCNC,,, | Performed by: INTERNAL MEDICINE

## 2023-08-31 PROCEDURE — 3288F FALL RISK ASSESSMENT DOCD: CPT | Mod: HCNC,CPTII,S$GLB, | Performed by: INTERNAL MEDICINE

## 2023-08-31 PROCEDURE — 1101F PR PT FALLS ASSESS DOC 0-1 FALLS W/OUT INJ PAST YR: ICD-10-PCS | Mod: HCNC,CPTII,S$GLB, | Performed by: INTERNAL MEDICINE

## 2023-08-31 PROCEDURE — 3008F BODY MASS INDEX DOCD: CPT | Mod: HCNC,CPTII,S$GLB, | Performed by: INTERNAL MEDICINE

## 2023-08-31 PROCEDURE — 3079F DIAST BP 80-89 MM HG: CPT | Mod: HCNC,CPTII,S$GLB, | Performed by: INTERNAL MEDICINE

## 2023-08-31 PROCEDURE — 1126F AMNT PAIN NOTED NONE PRSNT: CPT | Mod: HCNC,CPTII,S$GLB, | Performed by: INTERNAL MEDICINE

## 2023-08-31 PROCEDURE — 4010F PR ACE/ARB THEARPY RXD/TAKEN: ICD-10-PCS | Mod: HCNC,CPTII,S$GLB, | Performed by: INTERNAL MEDICINE

## 2023-08-31 PROCEDURE — 3008F PR BODY MASS INDEX (BMI) DOCUMENTED: ICD-10-PCS | Mod: HCNC,CPTII,S$GLB, | Performed by: INTERNAL MEDICINE

## 2023-08-31 PROCEDURE — 1160F RVW MEDS BY RX/DR IN RCRD: CPT | Mod: HCNC,CPTII,S$GLB, | Performed by: INTERNAL MEDICINE

## 2023-08-31 PROCEDURE — 93005 ELECTROCARDIOGRAM TRACING: CPT | Mod: HCNC,PO

## 2023-08-31 PROCEDURE — 3075F SYST BP GE 130 - 139MM HG: CPT | Mod: HCNC,CPTII,S$GLB, | Performed by: INTERNAL MEDICINE

## 2023-08-31 PROCEDURE — 3075F PR MOST RECENT SYSTOLIC BLOOD PRESS GE 130-139MM HG: ICD-10-PCS | Mod: HCNC,CPTII,S$GLB, | Performed by: INTERNAL MEDICINE

## 2023-08-31 PROCEDURE — 1126F PR PAIN SEVERITY QUANTIFIED, NO PAIN PRESENT: ICD-10-PCS | Mod: HCNC,CPTII,S$GLB, | Performed by: INTERNAL MEDICINE

## 2023-08-31 PROCEDURE — 4010F ACE/ARB THERAPY RXD/TAKEN: CPT | Mod: HCNC,CPTII,S$GLB, | Performed by: INTERNAL MEDICINE

## 2023-08-31 PROCEDURE — 3288F PR FALLS RISK ASSESSMENT DOCUMENTED: ICD-10-PCS | Mod: HCNC,CPTII,S$GLB, | Performed by: INTERNAL MEDICINE

## 2023-08-31 PROCEDURE — 99999 PR PBB SHADOW E&M-EST. PATIENT-LVL IV: CPT | Mod: PBBFAC,HCNC,, | Performed by: INTERNAL MEDICINE

## 2023-08-31 PROCEDURE — 93010 EKG 12-LEAD: ICD-10-PCS | Mod: HCNC,,, | Performed by: INTERNAL MEDICINE

## 2023-08-31 PROCEDURE — 1159F MED LIST DOCD IN RCRD: CPT | Mod: HCNC,CPTII,S$GLB, | Performed by: INTERNAL MEDICINE

## 2023-08-31 PROCEDURE — 3079F PR MOST RECENT DIASTOLIC BLOOD PRESSURE 80-89 MM HG: ICD-10-PCS | Mod: HCNC,CPTII,S$GLB, | Performed by: INTERNAL MEDICINE

## 2023-08-31 PROCEDURE — 99214 PR OFFICE/OUTPT VISIT, EST, LEVL IV, 30-39 MIN: ICD-10-PCS | Mod: HCNC,S$GLB,, | Performed by: INTERNAL MEDICINE

## 2023-08-31 PROCEDURE — 99214 OFFICE O/P EST MOD 30 MIN: CPT | Mod: HCNC,S$GLB,, | Performed by: INTERNAL MEDICINE

## 2023-08-31 PROCEDURE — 99999 PR PBB SHADOW E&M-EST. PATIENT-LVL IV: ICD-10-PCS | Mod: PBBFAC,HCNC,, | Performed by: INTERNAL MEDICINE

## 2023-08-31 PROCEDURE — 1159F PR MEDICATION LIST DOCUMENTED IN MEDICAL RECORD: ICD-10-PCS | Mod: HCNC,CPTII,S$GLB, | Performed by: INTERNAL MEDICINE

## 2023-08-31 NOTE — PROGRESS NOTES
Subjective:   Patient ID:  Andrés Casillas is a 73 y.o. male who presents for follow-up of No chief complaint on file.    Recent medication change include amlodipine 2.5 mg daily.  Patient denies chest pain, angina or symptoms associated with anginal equivalent.  Currently patient is taking lisinopril 40 mg daily and amlodipine 5 mg daily.  Blood pressure response last several days have been excellent will continue with this line of medication.  Patient here for follow-up evaluation hypertension.  Tachycardia past is resolving.  Normal stress test and intermittent mild palpitations with PVCs on recent Holter monitor.     Patient stable blood pressure still elevated will increase amlodipine 10 mg daily follow-up evaluation in 1 month.  Patient would like to be more active and walk more daily.     Patient more active in doing well blood pressure heart rate stable today.   Patient denies chest pain, angina or symptoms associated with anginal equivalent.  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     This visit finds patient feeling well stable heart rate blood pressure chronic medications.  No significant tachyarrhythmias noted.  EKG showed normal sinus rhythm right bundle-branch block no acute changes.        01/27/2023: Overall patient is doing well no recurrence symptoms of chest pain or shortness of breath heart rate blood pressure stable he is doing well this time   NO FOCAL CNS SYMPTOMS OR SIGNS TO SUGGEST TIA OR STROKE  NO ANGINA OR EQUIVALENT  NO UNUSUAL RIVAS. NO ORTHOPNEA OR PND  NO PALPITATIONS  NO NEAR SYNCOPE OR SYNCOPE  NO EDEMA. NO CALVE TENDERNESS  08/31/2023 overall patient doing well no exertional symptoms  chest pain shortness breath all medications reviewed renewed stable see the patient 6 months review.        Review of Systems   Constitutional: Negative for chills, diaphoresis, night sweats, weight gain and weight loss.   HENT:  Negative for congestion, hoarse voice, sore throat and stridor.    Eyes:  Negative for double vision and pain.   Cardiovascular:  Negative for chest pain, claudication, cyanosis, dyspnea on exertion, irregular heartbeat, leg swelling, near-syncope, orthopnea, palpitations, paroxysmal nocturnal dyspnea and syncope.   Respiratory:  Negative for cough, hemoptysis, shortness of breath, sleep disturbances due to breathing, snoring, sputum production and wheezing.    Endocrine: Negative for cold intolerance, heat intolerance and polydipsia.   Hematologic/Lymphatic: Negative for bleeding problem. Does not bruise/bleed easily.   Skin:  Negative for color change, dry skin and rash.   Musculoskeletal:  Negative for joint swelling and muscle cramps.   Gastrointestinal:  Negative for bloating, abdominal pain, constipation, diarrhea, dysphagia, melena, nausea and vomiting.   Genitourinary:  Negative for flank pain and urgency.   Neurological:  Negative for dizziness, focal weakness, headaches, light-headedness, loss of balance, seizures and weakness.   Psychiatric/Behavioral:  Negative for altered mental status and memory loss. The patient is not nervous/anxious.    Family History   Problem Relation Age of Onset    Heart disease Mother     Cancer Mother     Heart disease Father     Prostate cancer Brother      Past Medical History:   Diagnosis Date    Anxiety     BPH (benign prostatic hypertrophy)     DDD (degenerative disc disease), lumbar     Hypertension     GAURANG (obstructive sleep apnea)      Social History     Socioeconomic History    Marital status: Single   Occupational History     Employer: [employer]   Tobacco Use    Smoking status: Never    Smokeless tobacco: Never   Substance and Sexual  Activity    Alcohol use: Yes     Alcohol/week: 1.7 standard drinks of alcohol     Types: 2 Standard drinks or equivalent per week     Comment: socially    Drug use: No    Sexual activity: Yes     Partners: Female   Social History Narrative    ** Merged History Encounter **          Social Determinants of Health     Financial Resource Strain: Low Risk  (7/13/2020)    Overall Financial Resource Strain (CARDIA)     Difficulty of Paying Living Expenses: Not very hard   Food Insecurity: No Food Insecurity (7/13/2020)    Hunger Vital Sign     Worried About Running Out of Food in the Last Year: Never true     Ran Out of Food in the Last Year: Never true   Transportation Needs: No Transportation Needs (7/13/2020)    PRAPARE - Transportation     Lack of Transportation (Medical): No     Lack of Transportation (Non-Medical): No   Physical Activity: Sufficiently Active (7/13/2020)    Exercise Vital Sign     Days of Exercise per Week: 5 days     Minutes of Exercise per Session: 30 min   Stress: Stress Concern Present (7/13/2020)    [place] [employer] - Occupational Stress Questionnaire     Feeling of Stress : To some extent   Social Connections: Moderately Integrated (7/13/2020)    Social Connection and Isolation Panel [NHANES]     Frequency of Communication with Friends and Family: More than three times a week     Frequency of Social Gatherings with Friends and Family: More than three times a week     Attends Christian Services: More than 4 times per year     Active Member of Clubs or Organizations: No     Attends Club or Organization Meetings: Never     Marital Status:      Current Outpatient Medications on File Prior to Visit   Medication Sig Dispense Refill    albuterol (PROVENTIL/VENTOLIN HFA) 90 mcg/actuation inhaler Inhale 2 puffs into the lungs every 6 (six) hours as needed for Wheezing. 18 g 1    albuterol (PROVENTIL/VENTOLIN HFA) 90 mcg/actuation inhaler TAKE 2 PUFFS BY MOUTH EVERY 6 HOURS  AS NEEDED FOR WHEEZE 54 g 2    alfuzosin (UROXATRAL) 10 mg Tb24       ALPRAZolam (XANAX) 1 MG tablet Take 1 tablet (1 mg total) by mouth 3 (three) times daily. 90 tablet 5    amLODIPine (NORVASC) 10 MG tablet Take 1 tablet (10 mg total) by mouth once daily. 90 tablet 3    buPROPion (WELLBUTRIN XL) 300 MG 24 hr tablet Take 1 tablet (300 mg total) by mouth once daily. 30 tablet 11    cholecalciferol, vitamin D3, (VITAMIN D3) 25 mcg (1,000 unit) capsule Take 1,000 Units by mouth.      co-enzyme Q-10 30 mg capsule Take 30 mg by mouth once daily.       cyanocobalamin (VITAMIN B-12) 100 MCG tablet Take 100 mcg by mouth.      DIINDOLYLMETHANE, BULK, MISC 100 mg by Other route.      docusate sodium (COLACE) 100 MG capsule Take 1 capsule (100 mg total) by mouth 2 (two) times daily as needed for Constipation. 60 capsule 0    dorzolamide-timolol, PF, (COSOPT PF) 2-0.5 % Dpet ophthalmic solution       fish oil-omega-3 fatty acids 300-1,000 mg capsule Take 1 capsule by mouth once daily.       furosemide (LASIX) 40 MG tablet TAKE 1 TABLET BY MOUTH DAILY AS NEEDED FOR FLUID RETENSION 90 tablet 4    latanoprost 0.005 % ophthalmic solution Place 1 drop into both eyes nightly.      lisinopriL (PRINIVIL,ZESTRIL) 40 MG tablet TAKE 1 TABLET BY MOUTH EVERY DAY 90 tablet 3    magnesium oxide (MAG-OX) 400 mg (241.3 mg magnesium) tablet Take 400 mg by mouth once daily.       montelukast (SINGULAIR) 10 mg tablet TAKE 1 TABLET BY MOUTH EVERY DAY IN THE EVENING 90 tablet 2    tadalafiL (CIALIS) 5 MG tablet Take 5 mg by mouth once daily.      tamsulosin (FLOMAX) 0.4 mg Cp24 0.4 mg.       terazosin (HYTRIN) 5 MG capsule Take by mouth.      terbinafine HCL (LAMISIL) 250 mg tablet TAKE 1 TABLET BY MOUTH AS DIRECTED TAKE CONSECUTIVE 10 DAYS OF A MONTH X 3 MONTHS      testosterone cypionate (DEPOTESTOTERONE CYPIONATE) 100 mg/mL injection Inject into the muscle.      zinc gluconate 50 mg tablet Take 50 mg by mouth.       No current  facility-administered medications on file prior to visit.     Review of patient's allergies indicates:   Allergen Reactions    No known drug allergies        Objective:     Physical Exam  Eyes:      Pupils: Pupils are equal, round, and reactive to light.   Neck:      Trachea: No tracheal deviation.   Cardiovascular:      Rate and Rhythm: Normal rate and regular rhythm.      Pulses: Intact distal pulses.           Carotid pulses are 2+ on the right side and 2+ on the left side.       Radial pulses are 2+ on the right side and 2+ on the left side.        Femoral pulses are 2+ on the right side and 2+ on the left side.       Popliteal pulses are 2+ on the right side and 2+ on the left side.        Dorsalis pedis pulses are 2+ on the right side and 2+ on the left side.        Posterior tibial pulses are 2+ on the right side and 2+ on the left side.      Heart sounds: Normal heart sounds. No murmur heard.     No friction rub. No gallop.   Pulmonary:      Effort: Pulmonary effort is normal. No respiratory distress.      Breath sounds: Normal breath sounds. No stridor. No wheezing or rales.   Chest:      Chest wall: No tenderness.   Abdominal:      General: There is no distension.      Tenderness: There is no abdominal tenderness. There is no rebound.   Musculoskeletal:         General: No tenderness.      Cervical back: Normal range of motion.   Skin:     General: Skin is warm and dry.   Neurological:      Mental Status: He is alert and oriented to person, place, and time.   EKG shows sinus rhythm no acute ST T wave changes.This EKG was personally reviewed by myself, I agree with the interpretation.  Assessment:     1. Primary hypertension    2. Abnormal ECG    3. GAURANG (obstructive sleep apnea)    4. Essential hypertension    5. Palpitation    6. Tachycardia    7. Anxiety    8. RBBB        Plan:     Primary hypertension    Abnormal ECG    GAURANG (obstructive sleep apnea)    Essential  hypertension    Palpitation    Tachycardia    Anxiety    RBBB    Impression 1 sleep apnea stable   2 essential hypertension stable current medications patient continue amlodipine lisinopril.  3 tachyarrhythmias stable   All questions answered patient doing well follow-up evaluation 6 months sooner if acute recurrence.

## 2023-10-30 ENCOUNTER — HOSPITAL ENCOUNTER (OUTPATIENT)
Dept: RADIOLOGY | Facility: HOSPITAL | Age: 73
Discharge: HOME | End: 2023-10-30
Attending: NURSE PRACTITIONER
Payer: MEDICARE

## 2023-10-30 ENCOUNTER — PATIENT MESSAGE (OUTPATIENT)
Dept: PULMONOLOGY | Facility: CLINIC | Age: 73
End: 2023-10-30
Payer: MEDICARE

## 2023-10-30 ENCOUNTER — OFFICE VISIT (OUTPATIENT)
Dept: FAMILY MEDICINE | Facility: CLINIC | Age: 73
End: 2023-10-30
Payer: MEDICARE

## 2023-10-30 ENCOUNTER — PATIENT MESSAGE (OUTPATIENT)
Dept: PSYCHIATRY | Facility: CLINIC | Age: 73
End: 2023-10-30
Payer: MEDICARE

## 2023-10-30 VITALS
BODY MASS INDEX: 31.24 KG/M2 | TEMPERATURE: 100 F | WEIGHT: 224 LBS | OXYGEN SATURATION: 97 % | DIASTOLIC BLOOD PRESSURE: 68 MMHG | HEART RATE: 78 BPM | SYSTOLIC BLOOD PRESSURE: 118 MMHG

## 2023-10-30 DIAGNOSIS — F41.9 ANXIETY: ICD-10-CM

## 2023-10-30 DIAGNOSIS — R45.4 IRRITABILITY: ICD-10-CM

## 2023-10-30 DIAGNOSIS — R79.89 OTHER SPECIFIED ABNORMAL FINDINGS OF BLOOD CHEMISTRY: ICD-10-CM

## 2023-10-30 DIAGNOSIS — G47.33 OSA (OBSTRUCTIVE SLEEP APNEA): ICD-10-CM

## 2023-10-30 DIAGNOSIS — R06.02 SOB (SHORTNESS OF BREATH): ICD-10-CM

## 2023-10-30 DIAGNOSIS — D53.9 NUTRITIONAL ANEMIA, UNSPECIFIED: ICD-10-CM

## 2023-10-30 DIAGNOSIS — R53.83 FATIGUE, UNSPECIFIED TYPE: ICD-10-CM

## 2023-10-30 DIAGNOSIS — Z79.899 OTHER LONG TERM (CURRENT) DRUG THERAPY: ICD-10-CM

## 2023-10-30 DIAGNOSIS — R53.83 FATIGUE, UNSPECIFIED TYPE: Primary | ICD-10-CM

## 2023-10-30 PROCEDURE — 93005 EKG 12-LEAD: ICD-10-PCS | Mod: HCNC,S$GLB,, | Performed by: NURSE PRACTITIONER

## 2023-10-30 PROCEDURE — 99999 PR PBB SHADOW E&M-EST. PATIENT-LVL V: CPT | Mod: PBBFAC,HCNC,, | Performed by: NURSE PRACTITIONER

## 2023-10-30 PROCEDURE — 3078F PR MOST RECENT DIASTOLIC BLOOD PRESSURE < 80 MM HG: ICD-10-PCS | Mod: HCNC,CPTII,S$GLB, | Performed by: NURSE PRACTITIONER

## 2023-10-30 PROCEDURE — 71046 X-RAY EXAM CHEST 2 VIEWS: CPT | Mod: 26,HCNC,, | Performed by: RADIOLOGY

## 2023-10-30 PROCEDURE — 71046 XR CHEST PA AND LATERAL: ICD-10-PCS | Mod: 26,HCNC,, | Performed by: RADIOLOGY

## 2023-10-30 PROCEDURE — 3074F SYST BP LT 130 MM HG: CPT | Mod: HCNC,CPTII,S$GLB, | Performed by: NURSE PRACTITIONER

## 2023-10-30 PROCEDURE — 71046 X-RAY EXAM CHEST 2 VIEWS: CPT | Mod: TC,HCNC,PO

## 2023-10-30 PROCEDURE — 3078F DIAST BP <80 MM HG: CPT | Mod: HCNC,CPTII,S$GLB, | Performed by: NURSE PRACTITIONER

## 2023-10-30 PROCEDURE — 1126F PR PAIN SEVERITY QUANTIFIED, NO PAIN PRESENT: ICD-10-PCS | Mod: HCNC,CPTII,S$GLB, | Performed by: NURSE PRACTITIONER

## 2023-10-30 PROCEDURE — 4010F PR ACE/ARB THEARPY RXD/TAKEN: ICD-10-PCS | Mod: HCNC,CPTII,S$GLB, | Performed by: NURSE PRACTITIONER

## 2023-10-30 PROCEDURE — 4010F ACE/ARB THERAPY RXD/TAKEN: CPT | Mod: HCNC,CPTII,S$GLB, | Performed by: NURSE PRACTITIONER

## 2023-10-30 PROCEDURE — 1101F PT FALLS ASSESS-DOCD LE1/YR: CPT | Mod: HCNC,CPTII,S$GLB, | Performed by: NURSE PRACTITIONER

## 2023-10-30 PROCEDURE — 1126F AMNT PAIN NOTED NONE PRSNT: CPT | Mod: HCNC,CPTII,S$GLB, | Performed by: NURSE PRACTITIONER

## 2023-10-30 PROCEDURE — 93005 ELECTROCARDIOGRAM TRACING: CPT | Mod: HCNC,S$GLB,, | Performed by: NURSE PRACTITIONER

## 2023-10-30 PROCEDURE — 1159F MED LIST DOCD IN RCRD: CPT | Mod: HCNC,CPTII,S$GLB, | Performed by: NURSE PRACTITIONER

## 2023-10-30 PROCEDURE — 99214 PR OFFICE/OUTPT VISIT, EST, LEVL IV, 30-39 MIN: ICD-10-PCS | Mod: HCNC,S$GLB,, | Performed by: NURSE PRACTITIONER

## 2023-10-30 PROCEDURE — 3008F PR BODY MASS INDEX (BMI) DOCUMENTED: ICD-10-PCS | Mod: HCNC,CPTII,S$GLB, | Performed by: NURSE PRACTITIONER

## 2023-10-30 PROCEDURE — 3288F FALL RISK ASSESSMENT DOCD: CPT | Mod: HCNC,CPTII,S$GLB, | Performed by: NURSE PRACTITIONER

## 2023-10-30 PROCEDURE — 1160F PR REVIEW ALL MEDS BY PRESCRIBER/CLIN PHARMACIST DOCUMENTED: ICD-10-PCS | Mod: HCNC,CPTII,S$GLB, | Performed by: NURSE PRACTITIONER

## 2023-10-30 PROCEDURE — 93010 EKG 12-LEAD: ICD-10-PCS | Mod: HCNC,S$GLB,, | Performed by: INTERNAL MEDICINE

## 2023-10-30 PROCEDURE — 3008F BODY MASS INDEX DOCD: CPT | Mod: HCNC,CPTII,S$GLB, | Performed by: NURSE PRACTITIONER

## 2023-10-30 PROCEDURE — 1159F PR MEDICATION LIST DOCUMENTED IN MEDICAL RECORD: ICD-10-PCS | Mod: HCNC,CPTII,S$GLB, | Performed by: NURSE PRACTITIONER

## 2023-10-30 PROCEDURE — 3074F PR MOST RECENT SYSTOLIC BLOOD PRESSURE < 130 MM HG: ICD-10-PCS | Mod: HCNC,CPTII,S$GLB, | Performed by: NURSE PRACTITIONER

## 2023-10-30 PROCEDURE — 99214 OFFICE O/P EST MOD 30 MIN: CPT | Mod: HCNC,S$GLB,, | Performed by: NURSE PRACTITIONER

## 2023-10-30 PROCEDURE — 99999 PR PBB SHADOW E&M-EST. PATIENT-LVL V: ICD-10-PCS | Mod: PBBFAC,HCNC,, | Performed by: NURSE PRACTITIONER

## 2023-10-30 PROCEDURE — 1160F RVW MEDS BY RX/DR IN RCRD: CPT | Mod: HCNC,CPTII,S$GLB, | Performed by: NURSE PRACTITIONER

## 2023-10-30 PROCEDURE — 1101F PR PT FALLS ASSESS DOC 0-1 FALLS W/OUT INJ PAST YR: ICD-10-PCS | Mod: HCNC,CPTII,S$GLB, | Performed by: NURSE PRACTITIONER

## 2023-10-30 PROCEDURE — 93010 ELECTROCARDIOGRAM REPORT: CPT | Mod: HCNC,S$GLB,, | Performed by: INTERNAL MEDICINE

## 2023-10-30 PROCEDURE — 3288F PR FALLS RISK ASSESSMENT DOCUMENTED: ICD-10-PCS | Mod: HCNC,CPTII,S$GLB, | Performed by: NURSE PRACTITIONER

## 2023-10-30 NOTE — PROGRESS NOTES
"Subjective     Patient ID: Andrés Casillas is a 73 y.o. male.    Chief Complaint: Fatigue    Fatigue  This is a chronic problem. The current episode started more than 1 month ago. The problem occurs daily. The problem has been unchanged. Associated symptoms include fatigue. Pertinent negatives include no abdominal pain, anorexia, arthralgias, change in bowel habit, chest pain, chills, congestion, coughing, diaphoresis, fever, headaches, joint swelling, myalgias, nausea, neck pain, numbness, rash, sore throat, swollen glands, urinary symptoms, vertigo, visual change, vomiting or weakness. Nothing aggravates the symptoms. He has tried nothing (Pt states stopped norvasc on his own; states, "my blood pressure was running low, so I stopped the norvasc. I'm going to schedule a follow up with my cardiologist to let him know." BP WNL today.) for the symptoms. The treatment provided no relief (Pt has history of b12, vit D deficiency; also states has GAURANG and uses CPAP; states has not had settings checked in "a long time.").   Pt also states, "All of this might be in my head. I take xanax for anxiety and it works. I've jut been getting more irritable lately." Denies depression, SI/HI.   Past Medical History:   Diagnosis Date    Anxiety     BPH (benign prostatic hypertrophy)     DDD (degenerative disc disease), lumbar     Hypertension     GAURANG (obstructive sleep apnea)      Social History     Socioeconomic History    Marital status: Single   Occupational History     Employer: [employer]   Tobacco Use    Smoking status: Never    Smokeless tobacco: Never   Substance and Sexual Activity    Alcohol use: Yes     Alcohol/week: 1.7 standard drinks of alcohol     Types: 2 Standard drinks or equivalent per week     Comment: socially    Drug use: No    Sexual activity: Yes     Partners: Female   Social History Narrative    ** Merged History Encounter **          Social Determinants of Health     Financial Resource Strain: Low Risk  " (7/13/2020)    Overall Financial Resource Strain (CARDIA)     Difficulty of Paying Living Expenses: Not very hard   Food Insecurity: No Food Insecurity (7/13/2020)    Hunger Vital Sign     Worried About Running Out of Food in the Last Year: Never true     Ran Out of Food in the Last Year: Never true   Transportation Needs: No Transportation Needs (7/13/2020)    PRAPARE - Transportation     Lack of Transportation (Medical): No     Lack of Transportation (Non-Medical): No   Physical Activity: Sufficiently Active (7/13/2020)    Exercise Vital Sign     Days of Exercise per Week: 5 days     Minutes of Exercise per Session: 30 min   Stress: Stress Concern Present (7/13/2020)    [place] [employer] - Occupational Stress Questionnaire     Feeling of Stress : To some extent   Social Connections: Moderately Integrated (7/13/2020)    Social Connection and Isolation Panel [NHANES]     Frequency of Communication with Friends and Family: More than three times a week     Frequency of Social Gatherings with Friends and Family: More than three times a week     Attends [employer] Services: More than 4 times per year     Active Member of Clubs or Organizations: No     Attends Club or Organization Meetings: Never     Marital Status:      Past Surgical History:   Procedure Laterality Date    JOINT REPLACEMENT         Review of Systems   Constitutional:  Positive for fatigue. Negative for chills, diaphoresis and fever.   HENT:  Negative for nasal congestion and sore throat.    Eyes: Negative.    Respiratory: Negative.  Negative for cough.    Cardiovascular: Negative.  Negative for chest pain.   Gastrointestinal: Negative.  Negative for abdominal pain, anorexia, change in bowel habit, nausea and vomiting.   Endocrine: Negative.    Genitourinary: Negative.    Musculoskeletal: Negative.  Negative for arthralgias, joint swelling, myalgias and neck pain.   Integumentary:  Negative for rash. Negative.    Allergic/Immunologic: Negative.    Neurological: Negative.  Negative for vertigo, weakness, numbness and headaches.   Psychiatric/Behavioral:  Positive for agitation. The patient is nervous/anxious.           Objective     Physical Exam  Vitals and nursing note reviewed.   Constitutional:       Appearance: Normal appearance.   HENT:      Head: Normocephalic.      Right Ear: Tympanic membrane, ear canal and external ear normal.      Left Ear: Tympanic membrane, ear canal and external ear normal.      Nose: Nose normal.      Mouth/Throat:      Mouth: Mucous membranes are moist.      Pharynx: Oropharynx is clear.   Eyes:      Conjunctiva/sclera: Conjunctivae normal.      Pupils: Pupils are equal, round, and reactive to light.   Cardiovascular:      Rate and Rhythm: Normal rate and regular rhythm.      Pulses: Normal pulses.      Heart sounds: Normal heart sounds.   Pulmonary:      Effort: Pulmonary effort is normal.      Breath sounds: Normal breath sounds.   Abdominal:      General: Bowel sounds are normal.      Palpations: Abdomen is soft.   Musculoskeletal:         General: Normal range of motion.      Cervical back: Normal range of motion and neck supple.   Skin:     General: Skin is warm and dry.      Capillary Refill: Capillary refill takes 2 to 3 seconds.   Neurological:      Mental Status: He is alert and oriented to person, place, and time.   Psychiatric:         Attention and Perception: Attention and perception normal.         Mood and Affect: Affect normal. Mood is anxious.         Behavior: Behavior normal. Behavior is cooperative.         Thought Content: Thought content normal.         Cognition and Memory: Cognition and memory normal.         Judgment: Judgment normal.            Assessment and Plan     1. Fatigue, unspecified type  2. GAURANG (obstructive sleep apnea)  3. SOB (shortness of breath)  4. Anxiety  5. Irritability  6. Other specified abnormal findings of blood chemistry  7. Nutritional  anemia, unspecified  8. Other long term (current) drug therapy  -     IN OFFICE EKG 12-LEAD (to Muse)  -     Testosterone; Future; Expected date: 10/30/2023  -     TSH; Future; Expected date: 10/30/2023  -     CBC Without Differential; Future; Expected date: 10/30/2023  -     Iron and TIBC; Future; Expected date: 10/30/2023  -     Vitamin B12; Future; Expected date: 10/30/2023  -     X-Ray Chest PA And Lateral; Future; Expected date: 10/30/2023  -     Vitamin D; Future; Expected date: 10/30/2023  -     Ambulatory referral/consult to Pulmonology; Future; Expected date: 11/06/2023        -     Ambulatory referral/consult to Psychiatry; Future; Expected date: 11/06/2023  F/U with cardiology ASAP  RTC as needed  Report to ER immediately if symptoms worsen or persist               No follow-ups on file.

## 2023-10-30 NOTE — PATIENT INSTRUCTIONS
F/U with cardiology ASAP  RTC as needed  Report to ER immediately if symptoms worsen or persist    Ketan Bowen,     If you are due for any health screening(s) below please notify me so we can arrange them to be ordered and scheduled. Most healthy patients at your age complete them, but you are free to accept or refuse.     If you can't do it, I'll definitely understand. If you can, I'd certainly appreciate it!    Tests to Keep You Healthy    Colon Cancer Screening: DUE  Last Blood Pressure <= 139/89 (8/31/2023): Yes      Its time for your colon cancer screening     Colorectal cancer is one of the leading causes of cancer death for men and women but it doesnt have to be. Screenings can prevent colorectal cancer or find it early enough to treat and cure the disease.     Our records indicate that you may be overdue for colon cancer screening. A colonoscopy or stool screening test can help identify patients at risk for developing colon cancer. Cancer screenings save lives, so schedule yours today to stay healthy.     A colonoscopy is the preferred test for detecting colon cancer. It is needed only once every 10 years if results are negative. While you are sedated, a flexible, lighted tube with a tiny camera is inserted into the rectum and advanced through the colon to look for cancers.     An alternative screening test that is used at home and returned to the lab may also be used. It detects hidden blood in bowel movements which could indicate cancer in the colon. If results are positive, you will need a colonoscopy to determine if the blood is a sign of cancer. This type of follow up (diagnostic) colonoscopy usually requires additional copays as required by your insurance provider.     If you recently had your colon cancer screening performed outside of [facility], please let your Health care team know so that they can update your health record. Please contact your PCP if you have any questions.

## 2023-10-31 ENCOUNTER — TELEPHONE (OUTPATIENT)
Dept: CARDIOLOGY | Facility: CLINIC | Age: 73
End: 2023-10-31
Payer: MEDICARE

## 2023-10-31 ENCOUNTER — TELEPHONE (OUTPATIENT)
Dept: FAMILY MEDICINE | Facility: CLINIC | Age: 73
End: 2023-10-31
Payer: MEDICARE

## 2023-10-31 NOTE — TELEPHONE ENCOUNTER
----- Message from Carmela Shukla MA sent at 10/31/2023  2:06 PM CDT -----  Contact: [phone]  Pt called              In regards to needing staff to give a call back.            Call back [phone]

## 2023-11-07 ENCOUNTER — OFFICE VISIT (OUTPATIENT)
Dept: CARDIOLOGY | Facility: CLINIC | Age: 73
End: 2023-11-07
Payer: MEDICARE

## 2023-11-07 VITALS
SYSTOLIC BLOOD PRESSURE: 122 MMHG | OXYGEN SATURATION: 98 % | HEART RATE: 85 BPM | DIASTOLIC BLOOD PRESSURE: 70 MMHG | HEIGHT: 71 IN | BODY MASS INDEX: 30.8 KG/M2 | WEIGHT: 220 LBS

## 2023-11-07 DIAGNOSIS — I10 ESSENTIAL HYPERTENSION: ICD-10-CM

## 2023-11-07 DIAGNOSIS — R94.31 ABNORMAL ECG: Primary | ICD-10-CM

## 2023-11-07 DIAGNOSIS — R06.09 DOE (DYSPNEA ON EXERTION): ICD-10-CM

## 2023-11-07 DIAGNOSIS — I45.2 RBBB (RIGHT BUNDLE BRANCH BLOCK WITH LEFT ANTERIOR FASCICULAR BLOCK): ICD-10-CM

## 2023-11-07 PROCEDURE — 1160F PR REVIEW ALL MEDS BY PRESCRIBER/CLIN PHARMACIST DOCUMENTED: ICD-10-PCS | Mod: HCNC,CPTII,S$GLB, | Performed by: INTERNAL MEDICINE

## 2023-11-07 PROCEDURE — 3074F SYST BP LT 130 MM HG: CPT | Mod: HCNC,CPTII,S$GLB, | Performed by: INTERNAL MEDICINE

## 2023-11-07 PROCEDURE — 3078F PR MOST RECENT DIASTOLIC BLOOD PRESSURE < 80 MM HG: ICD-10-PCS | Mod: HCNC,CPTII,S$GLB, | Performed by: INTERNAL MEDICINE

## 2023-11-07 PROCEDURE — 99214 PR OFFICE/OUTPT VISIT, EST, LEVL IV, 30-39 MIN: ICD-10-PCS | Mod: HCNC,S$GLB,, | Performed by: INTERNAL MEDICINE

## 2023-11-07 PROCEDURE — 3008F PR BODY MASS INDEX (BMI) DOCUMENTED: ICD-10-PCS | Mod: HCNC,CPTII,S$GLB, | Performed by: INTERNAL MEDICINE

## 2023-11-07 PROCEDURE — 1101F PR PT FALLS ASSESS DOC 0-1 FALLS W/OUT INJ PAST YR: ICD-10-PCS | Mod: HCNC,CPTII,S$GLB, | Performed by: INTERNAL MEDICINE

## 2023-11-07 PROCEDURE — 99214 OFFICE O/P EST MOD 30 MIN: CPT | Mod: HCNC,S$GLB,, | Performed by: INTERNAL MEDICINE

## 2023-11-07 PROCEDURE — 99999 PR PBB SHADOW E&M-EST. PATIENT-LVL IV: CPT | Mod: PBBFAC,HCNC,, | Performed by: INTERNAL MEDICINE

## 2023-11-07 PROCEDURE — 3288F PR FALLS RISK ASSESSMENT DOCUMENTED: ICD-10-PCS | Mod: HCNC,CPTII,S$GLB, | Performed by: INTERNAL MEDICINE

## 2023-11-07 PROCEDURE — 3008F BODY MASS INDEX DOCD: CPT | Mod: HCNC,CPTII,S$GLB, | Performed by: INTERNAL MEDICINE

## 2023-11-07 PROCEDURE — 1101F PT FALLS ASSESS-DOCD LE1/YR: CPT | Mod: HCNC,CPTII,S$GLB, | Performed by: INTERNAL MEDICINE

## 2023-11-07 PROCEDURE — 4010F PR ACE/ARB THEARPY RXD/TAKEN: ICD-10-PCS | Mod: HCNC,CPTII,S$GLB, | Performed by: INTERNAL MEDICINE

## 2023-11-07 PROCEDURE — 99999 PR PBB SHADOW E&M-EST. PATIENT-LVL IV: ICD-10-PCS | Mod: PBBFAC,HCNC,, | Performed by: INTERNAL MEDICINE

## 2023-11-07 PROCEDURE — 1126F PR PAIN SEVERITY QUANTIFIED, NO PAIN PRESENT: ICD-10-PCS | Mod: HCNC,CPTII,S$GLB, | Performed by: INTERNAL MEDICINE

## 2023-11-07 PROCEDURE — 3074F PR MOST RECENT SYSTOLIC BLOOD PRESSURE < 130 MM HG: ICD-10-PCS | Mod: HCNC,CPTII,S$GLB, | Performed by: INTERNAL MEDICINE

## 2023-11-07 PROCEDURE — 1126F AMNT PAIN NOTED NONE PRSNT: CPT | Mod: HCNC,CPTII,S$GLB, | Performed by: INTERNAL MEDICINE

## 2023-11-07 PROCEDURE — 3288F FALL RISK ASSESSMENT DOCD: CPT | Mod: HCNC,CPTII,S$GLB, | Performed by: INTERNAL MEDICINE

## 2023-11-07 PROCEDURE — 1160F RVW MEDS BY RX/DR IN RCRD: CPT | Mod: HCNC,CPTII,S$GLB, | Performed by: INTERNAL MEDICINE

## 2023-11-07 PROCEDURE — 1159F MED LIST DOCD IN RCRD: CPT | Mod: HCNC,CPTII,S$GLB, | Performed by: INTERNAL MEDICINE

## 2023-11-07 PROCEDURE — 1159F PR MEDICATION LIST DOCUMENTED IN MEDICAL RECORD: ICD-10-PCS | Mod: HCNC,CPTII,S$GLB, | Performed by: INTERNAL MEDICINE

## 2023-11-07 PROCEDURE — 3078F DIAST BP <80 MM HG: CPT | Mod: HCNC,CPTII,S$GLB, | Performed by: INTERNAL MEDICINE

## 2023-11-07 PROCEDURE — 4010F ACE/ARB THERAPY RXD/TAKEN: CPT | Mod: HCNC,CPTII,S$GLB, | Performed by: INTERNAL MEDICINE

## 2023-11-07 NOTE — PROGRESS NOTES
Subjective:   Patient ID:  Andrés Casillas is a 73 y.o. male who presents for follow-up of Follow-up (Abnormal EKG)      HPI73 yo WM with HTN and hx of abnormal EKG. Has had decrease exercise capacity and fatigue. No definite CP. Stopped his amlodipine because of feeling light headed and BP remains normal.     Review of Systems   Constitutional: Positive for malaise/fatigue. Negative for decreased appetite, fever, weight gain and weight loss.   HENT:  Negative for hearing loss and nosebleeds.    Eyes:  Negative for visual disturbance.   Cardiovascular:  Positive for dyspnea on exertion. Negative for chest pain, claudication, cyanosis, irregular heartbeat, leg swelling, near-syncope, orthopnea, palpitations, paroxysmal nocturnal dyspnea and syncope.   Respiratory:  Positive for shortness of breath. Negative for cough, hemoptysis, sleep disturbances due to breathing, snoring and wheezing.    Endocrine: Negative for cold intolerance, heat intolerance, polydipsia and polyuria.   Hematologic/Lymphatic: Negative for adenopathy and bleeding problem. Does not bruise/bleed easily.   Skin:  Negative for color change, itching, poor wound healing, rash and suspicious lesions.   Musculoskeletal:  Negative for arthritis, back pain, falls, joint pain, joint swelling, muscle cramps, muscle weakness and myalgias.   Gastrointestinal:  Negative for bloating, abdominal pain, change in bowel habit, constipation, flatus, heartburn, hematemesis, hematochezia, hemorrhoids, jaundice, melena, nausea and vomiting.   Genitourinary:  Negative for bladder incontinence, decreased libido, frequency, hematuria, hesitancy and urgency.   Neurological:  Negative for brief paralysis, difficulty with concentration, excessive daytime sleepiness, dizziness, focal weakness, headaches, light-headedness, loss of balance, numbness, vertigo and weakness.   Psychiatric/Behavioral:  Negative for altered mental status, depression and memory loss. The patient  "does not have insomnia and is not nervous/anxious.    Allergic/Immunologic: Negative for environmental allergies, hives and persistent infections.       Objective:   Physical Exam  Constitutional:       General: He is not in acute distress.     Appearance: He is well-developed. He is not diaphoretic.      Comments: /70 (BP Location: Left arm, Patient Position: Sitting, BP Method: Large (Manual))   Pulse 85   Ht 5' 11" (1.803 m)   Wt 99.8 kg (220 lb)   SpO2 98%   BMI 30.68 kg/m²      HENT:      Head: Normocephalic and atraumatic.   Eyes:      General: Lids are normal. No scleral icterus.        Right eye: No discharge.      Conjunctiva/sclera: Conjunctivae normal.      Pupils: Pupils are equal, round, and reactive to light.   Neck:      Thyroid: No thyromegaly.      Vascular: No JVD.      Trachea: No tracheal deviation.   Cardiovascular:      Rate and Rhythm: Normal rate and regular rhythm.      Pulses: Intact distal pulses.           Carotid pulses are 2+ on the right side and 2+ on the left side.       Radial pulses are 2+ on the right side and 2+ on the left side.        Femoral pulses are 2+ on the right side and 2+ on the left side.       Popliteal pulses are 2+ on the right side and 2+ on the left side.        Dorsalis pedis pulses are 2+ on the right side and 2+ on the left side.        Posterior tibial pulses are 2+ on the right side and 2+ on the left side.      Heart sounds: Normal heart sounds, S1 normal and S2 normal. No murmur heard.     No friction rub. No gallop.   Pulmonary:      Effort: Pulmonary effort is normal. No respiratory distress.      Breath sounds: Normal breath sounds. No wheezing or rales.   Chest:      Chest wall: No tenderness.   Abdominal:      General: Bowel sounds are normal. There is no distension.      Palpations: Abdomen is soft. There is no hepatomegaly or mass.      Tenderness: There is no abdominal tenderness. There is no guarding or rebound.   Musculoskeletal:       "   General: No tenderness. Normal range of motion.      Cervical back: Normal range of motion and neck supple.   Lymphadenopathy:      Cervical: No cervical adenopathy.   Skin:     General: Skin is warm and dry.      Coloration: Skin is not pale.      Findings: No erythema or rash.   Neurological:      Mental Status: He is alert and oriented to person, place, and time.      Cranial Nerves: No cranial nerve deficit.      Coordination: Coordination normal.      Deep Tendon Reflexes: Reflexes are normal and symmetric.   Psychiatric:         Speech: Speech normal.         Behavior: Behavior normal.         Thought Content: Thought content normal.         Judgment: Judgment normal.         Assessment:      1. Abnormal ECG    2. Essential hypertension    3. RIVAS (dyspnea on exertion)    4. RBBB (right bundle branch block with left anterior fascicular block)        Plan:   EKG has not changed but now having some symptoms that may be related to CAD   Patient advised to modify risk factors such as weight, exercise, diet,  tobacco and alcohol exposure      Orders Placed This Encounter   Procedures    Nuclear Stress - Cardiology Interpreted    Echo     Follow up in about 3 months (around 2/7/2024).

## 2023-11-08 ENCOUNTER — TELEPHONE (OUTPATIENT)
Dept: CARDIOLOGY | Facility: HOSPITAL | Age: 73
End: 2023-11-08
Payer: MEDICARE

## 2023-11-10 ENCOUNTER — TELEPHONE (OUTPATIENT)
Dept: CARDIOLOGY | Facility: HOSPITAL | Age: 73
End: 2023-11-10
Payer: MEDICARE

## 2023-11-13 ENCOUNTER — HOSPITAL ENCOUNTER (OUTPATIENT)
Dept: CARDIOLOGY | Facility: HOSPITAL | Age: 73
Discharge: HOME | End: 2023-11-13
Attending: INTERNAL MEDICINE
Payer: MEDICARE

## 2023-11-13 VITALS
WEIGHT: 220 LBS | HEART RATE: 69 BPM | DIASTOLIC BLOOD PRESSURE: 70 MMHG | BODY MASS INDEX: 30.8 KG/M2 | HEIGHT: 71 IN | SYSTOLIC BLOOD PRESSURE: 122 MMHG

## 2023-11-13 DIAGNOSIS — I10 ESSENTIAL HYPERTENSION: ICD-10-CM

## 2023-11-13 DIAGNOSIS — R94.31 ABNORMAL ECG: ICD-10-CM

## 2023-11-13 DIAGNOSIS — R06.09 DOE (DYSPNEA ON EXERTION): ICD-10-CM

## 2023-11-13 LAB
AORTIC ROOT ANNULUS: 3.54 CM
ASCENDING AORTA: 2.62 CM
AV INDEX (PROSTH): 0.77
AV MEAN GRADIENT: 6 MMHG
AV PEAK GRADIENT: 10 MMHG
AV VALVE AREA BY VELOCITY RATIO: 3.39 CM²
AV VALVE AREA: 3.23 CM²
AV VELOCITY RATIO: 0.81
BSA FOR ECHO PROCEDURE: 2.24 M2
CV ECHO LV RWT: 0.46 CM
DOP CALC AO PEAK VEL: 1.62 M/S
DOP CALC AO VTI: 33.7 CM
DOP CALC LVOT AREA: 4.2 CM2
DOP CALC LVOT DIAMETER: 2.31 CM
DOP CALC LVOT PEAK VEL: 1.31 M/S
DOP CALC LVOT STROKE VOLUME: 108.91 CM3
DOP CALC RVOT PEAK VEL: 0.68 M/S
DOP CALC RVOT VTI: 14.4 CM
DOP CALCLVOT PEAK VEL VTI: 26 CM
E WAVE DECELERATION TIME: 254.93 MSEC
E/A RATIO: 0.76
E/E' RATIO: 8 M/S
ECHO LV POSTERIOR WALL: 1.06 CM (ref 0.6–1.1)
FRACTIONAL SHORTENING: 29 % (ref 28–44)
INTERVENTRICULAR SEPTUM: 1.2 CM (ref 0.6–1.1)
IVC DIAMETER: 1.66 CM
IVRT: 97.05 MSEC
LA MAJOR: 5.13 CM
LA MINOR: 5.38 CM
LA WIDTH: 3.7 CM
LEFT ATRIUM SIZE: 3.86 CM
LEFT ATRIUM VOLUME INDEX MOD: 19.7 ML/M2
LEFT ATRIUM VOLUME INDEX: 29 ML/M2
LEFT ATRIUM VOLUME MOD: 43.33 CM3
LEFT ATRIUM VOLUME: 63.76 CM3
LEFT INTERNAL DIMENSION IN SYSTOLE: 3.32 CM (ref 2.1–4)
LEFT VENTRICLE DIASTOLIC VOLUME INDEX: 45.34 ML/M2
LEFT VENTRICLE DIASTOLIC VOLUME: 99.74 ML
LEFT VENTRICLE MASS INDEX: 87 G/M2
LEFT VENTRICLE SYSTOLIC VOLUME INDEX: 20.3 ML/M2
LEFT VENTRICLE SYSTOLIC VOLUME: 44.75 ML
LEFT VENTRICULAR INTERNAL DIMENSION IN DIASTOLE: 4.65 CM (ref 3.5–6)
LEFT VENTRICULAR MASS: 191.46 G
LV LATERAL E/E' RATIO: 6.8 M/S
LV SEPTAL E/E' RATIO: 9.71 M/S
LVOT MG: 3.27 MMHG
LVOT MV: 0.83 CM/S
MV PEAK A VEL: 0.9 M/S
MV PEAK E VEL: 0.68 M/S
MV STENOSIS PRESSURE HALF TIME: 73.93 MS
MV VALVE AREA P 1/2 METHOD: 2.98 CM2
PISA TR MAX VEL: 2.77 M/S
PULM VEIN S/D RATIO: 1.43
PV MEAN GRADIENT: 1 MMHG
PV PEAK D VEL: 0.46 M/S
PV PEAK GRADIENT: 17 MMHG
PV PEAK S VEL: 0.66 M/S
PV PEAK VELOCITY: 2.06 M/S
RA MAJOR: 3.57 CM
RA PRESSURE ESTIMATED: 3 MMHG
RA WIDTH: 2.5 CM
RIGHT VENTRICULAR END-DIASTOLIC DIMENSION: 3.28 CM
RV TB RVSP: 6 MMHG
STJ: 3.19 CM
TDI LATERAL: 0.1 M/S
TDI SEPTAL: 0.07 M/S
TDI: 0.09 M/S
TR MAX PG: 31 MMHG
TRICUSPID ANNULAR PLANE SYSTOLIC EXCURSION: 2.19 CM
TV REST PULMONARY ARTERY PRESSURE: 34 MMHG
Z-SCORE OF LEFT VENTRICULAR DIMENSION IN END DIASTOLE: -4.8
Z-SCORE OF LEFT VENTRICULAR DIMENSION IN END SYSTOLE: -2.52

## 2023-11-13 PROCEDURE — 93306 ECHO (CUPID ONLY): ICD-10-PCS | Mod: 26,HCNC,, | Performed by: INTERNAL MEDICINE

## 2023-11-13 PROCEDURE — 93306 TTE W/DOPPLER COMPLETE: CPT | Mod: HCNC,PO

## 2023-11-13 PROCEDURE — 93306 TTE W/DOPPLER COMPLETE: CPT | Mod: 26,HCNC,, | Performed by: INTERNAL MEDICINE

## 2023-11-14 ENCOUNTER — TELEPHONE (OUTPATIENT)
Dept: CARDIOLOGY | Facility: HOSPITAL | Age: 73
End: 2023-11-14
Payer: MEDICARE

## 2023-11-20 ENCOUNTER — TELEPHONE (OUTPATIENT)
Dept: CARDIOLOGY | Facility: HOSPITAL | Age: 73
End: 2023-11-20
Payer: MEDICARE

## 2023-11-21 ENCOUNTER — PATIENT MESSAGE (OUTPATIENT)
Dept: CARDIOLOGY | Facility: CLINIC | Age: 73
End: 2023-11-21
Payer: MEDICARE

## 2023-11-21 ENCOUNTER — TELEPHONE (OUTPATIENT)
Dept: CARDIOLOGY | Facility: CLINIC | Age: 73
End: 2023-11-21
Payer: MEDICARE

## 2023-11-21 NOTE — TELEPHONE ENCOUNTER
Spoke with the patient to answer some questions regarding his nuclear stress test. I gave him the address and sent him the instructions via My Chart

## 2023-12-05 ENCOUNTER — PATIENT MESSAGE (OUTPATIENT)
Dept: RADIOLOGY | Facility: HOSPITAL | Age: 73
End: 2023-12-05
Payer: MEDICARE

## 2023-12-07 ENCOUNTER — CLINICAL SUPPORT (OUTPATIENT)
Dept: CARDIOLOGY | Facility: HOSPITAL | Age: 73
End: 2023-12-07
Attending: INTERNAL MEDICINE
Payer: MEDICARE

## 2023-12-07 ENCOUNTER — HOSPITAL ENCOUNTER (OUTPATIENT)
Dept: RADIOLOGY | Facility: HOSPITAL | Age: 73
Discharge: HOME | End: 2023-12-07
Attending: INTERNAL MEDICINE
Payer: MEDICARE

## 2023-12-07 VITALS — BODY MASS INDEX: 30.8 KG/M2 | WEIGHT: 220 LBS | HEIGHT: 71 IN

## 2023-12-07 DIAGNOSIS — R94.31 ABNORMAL ECG: ICD-10-CM

## 2023-12-07 DIAGNOSIS — R06.09 DOE (DYSPNEA ON EXERTION): ICD-10-CM

## 2023-12-07 DIAGNOSIS — I10 ESSENTIAL HYPERTENSION: ICD-10-CM

## 2023-12-07 PROCEDURE — 93016 CV STRESS TEST SUPVJ ONLY: CPT | Mod: HCNC,,, | Performed by: INTERNAL MEDICINE

## 2023-12-07 PROCEDURE — 63600175 PHARM REV CODE 636 W HCPCS: Mod: HCNC,PO | Performed by: INTERNAL MEDICINE

## 2023-12-07 PROCEDURE — 93018 PR CARDIAC STRESS TST,INTERP/REPT ONLY: ICD-10-PCS | Mod: HCNC,,, | Performed by: INTERNAL MEDICINE

## 2023-12-07 PROCEDURE — 78452 HT MUSCLE IMAGE SPECT MULT: CPT | Mod: 26,HCNC,, | Performed by: INTERNAL MEDICINE

## 2023-12-07 PROCEDURE — 93016 NUCLEAR STRESS - CARDIOLOGY INTERPRETED (CUPID ONLY): ICD-10-PCS | Mod: HCNC,,, | Performed by: INTERNAL MEDICINE

## 2023-12-07 PROCEDURE — 78452 HT MUSCLE IMAGE SPECT MULT: CPT | Mod: HCNC,PO

## 2023-12-07 PROCEDURE — 93018 CV STRESS TEST I&R ONLY: CPT | Mod: HCNC,,, | Performed by: INTERNAL MEDICINE

## 2023-12-07 PROCEDURE — 93017 CV STRESS TEST TRACING ONLY: CPT | Mod: HCNC,PO

## 2023-12-07 PROCEDURE — 78452 NUCLEAR STRESS - CARDIOLOGY INTERPRETED (CUPID ONLY): ICD-10-PCS | Mod: 26,HCNC,, | Performed by: INTERNAL MEDICINE

## 2023-12-07 RX ORDER — REGADENOSON 0.08 MG/ML
0.4 INJECTION, SOLUTION INTRAVENOUS
Status: COMPLETED | OUTPATIENT
Start: 2023-12-07 | End: 2023-12-07

## 2023-12-07 RX ADMIN — REGADENOSON 0.4 MG: 0.08 INJECTION, SOLUTION INTRAVENOUS at 01:12

## 2023-12-08 LAB
CV PHARM DOSE: 0.4 MG
CV STRESS BASE HR: 68 BPM
DIASTOLIC BLOOD PRESSURE: 81 MMHG
OHS CV CPX 1 MINUTE RECOVERY HEART RATE: 80 BPM
OHS CV CPX 85 PERCENT MAX PREDICTED HEART RATE MALE: 127
OHS CV CPX MAX PREDICTED HEART RATE: 149
OHS CV CPX PATIENT IS FEMALE: 0
OHS CV CPX PATIENT IS MALE: 1
OHS CV CPX PEAK DIASTOLIC BLOOD PRESSURE: 73 MMHG
OHS CV CPX PEAK HEAR RATE: 102 BPM
OHS CV CPX PEAK RATE PRESSURE PRODUCT: NORMAL
OHS CV CPX PEAK SYSTOLIC BLOOD PRESSURE: 139 MMHG
OHS CV CPX PERCENT MAX PREDICTED HEART RATE ACHIEVED: 68
OHS CV CPX RATE PRESSURE PRODUCT PRESENTING: 9316
OHS CV PHARM TIME: 1340 MIN
SYSTOLIC BLOOD PRESSURE: 137 MMHG

## 2023-12-11 ENCOUNTER — PATIENT MESSAGE (OUTPATIENT)
Dept: CARDIOLOGY | Facility: CLINIC | Age: 73
End: 2023-12-11
Payer: MEDICARE

## 2023-12-12 PROBLEM — J98.4 RESTRICTIVE LUNG DISEASE SECONDARY TO OBESITY: Status: ACTIVE | Noted: 2023-12-12

## 2023-12-12 PROBLEM — E66.9 OBESITY (BMI 30.0-34.9): Status: ACTIVE | Noted: 2023-12-12

## 2023-12-12 PROBLEM — E66.811 OBESITY (BMI 30.0-34.9): Status: ACTIVE | Noted: 2023-12-12

## 2023-12-12 PROBLEM — E66.9 RESTRICTIVE LUNG DISEASE SECONDARY TO OBESITY: Status: ACTIVE | Noted: 2023-12-12

## 2023-12-13 DIAGNOSIS — Z01.818 PREOP TESTING: Primary | ICD-10-CM

## 2023-12-14 ENCOUNTER — TELEPHONE (OUTPATIENT)
Dept: CARDIOLOGY | Facility: CLINIC | Age: 73
End: 2023-12-14
Payer: MEDICARE

## 2023-12-15 ENCOUNTER — LAB VISIT (OUTPATIENT)
Dept: LAB | Facility: HOSPITAL | Age: 73
End: 2023-12-15
Attending: INTERNAL MEDICINE
Payer: MEDICARE

## 2023-12-15 DIAGNOSIS — Z01.818 PREOP TESTING: ICD-10-CM

## 2023-12-15 LAB
ANION GAP SERPL CALC-SCNC: 10 MMOL/L (ref 8–16)
BUN SERPL-MCNC: 36 MG/DL (ref 8–23)
CALCIUM SERPL-MCNC: 9.7 MG/DL (ref 8.7–10.5)
CHLORIDE SERPL-SCNC: 106 MMOL/L (ref 95–110)
CO2 SERPL-SCNC: 24 MMOL/L (ref 23–29)
CREAT SERPL-MCNC: 1 MG/DL (ref 0.5–1.4)
ERYTHROCYTE [DISTWIDTH] IN BLOOD BY AUTOMATED COUNT: 12.3 % (ref 11.5–14.5)
EST. GFR  (NO RACE VARIABLE): >60 ML/MIN/1.73 M^2
GLUCOSE SERPL-MCNC: 102 MG/DL (ref 70–110)
HCT VFR BLD AUTO: 44.1 % (ref 40–54)
HGB BLD-MCNC: 15.3 G/DL (ref 14–18)
MCH RBC QN AUTO: 32.6 PG (ref 27–31)
MCHC RBC AUTO-ENTMCNC: 34.7 G/DL (ref 32–36)
MCV RBC AUTO: 94 FL (ref 82–98)
PLATELET # BLD AUTO: 222 K/UL (ref 150–450)
PMV BLD AUTO: 11.1 FL (ref 9.2–12.9)
POTASSIUM SERPL-SCNC: 5.4 MMOL/L (ref 3.5–5.1)
RBC # BLD AUTO: 4.7 M/UL (ref 4.6–6.2)
SODIUM SERPL-SCNC: 140 MMOL/L (ref 136–145)
WBC # BLD AUTO: 5.54 K/UL (ref 3.9–12.7)

## 2023-12-15 PROCEDURE — 85027 COMPLETE CBC AUTOMATED: CPT | Performed by: INTERNAL MEDICINE

## 2023-12-15 PROCEDURE — 80048 BASIC METABOLIC PNL TOTAL CA: CPT | Performed by: INTERNAL MEDICINE

## 2023-12-15 PROCEDURE — 36415 COLL VENOUS BLD VENIPUNCTURE: CPT | Mod: PO | Performed by: INTERNAL MEDICINE

## 2023-12-17 NOTE — H&P
Subjective:   Patient ID:  Andrés Casillas is a 73 y.o. male who presents for follow-up of Follow-up (Abnormal EKG)        HPI73 yo WM with HTN and hx of abnormal EKG. Has had decrease exercise capacity and fatigue. No definite CP. Stopped his amlodipine because of feeling light headed and BP remains normal.      Review of Systems   Constitutional: Positive for malaise/fatigue. Negative for decreased appetite, fever, weight gain and weight loss.   HENT:  Negative for hearing loss and nosebleeds.    Eyes:  Negative for visual disturbance.   Cardiovascular:  Positive for dyspnea on exertion. Negative for chest pain, claudication, cyanosis, irregular heartbeat, leg swelling, near-syncope, orthopnea, palpitations, paroxysmal nocturnal dyspnea and syncope.   Respiratory:  Positive for shortness of breath. Negative for cough, hemoptysis, sleep disturbances due to breathing, snoring and wheezing.    Endocrine: Negative for cold intolerance, heat intolerance, polydipsia and polyuria.   Hematologic/Lymphatic: Negative for adenopathy and bleeding problem. Does not bruise/bleed easily.   Skin:  Negative for color change, itching, poor wound healing, rash and suspicious lesions.   Musculoskeletal:  Negative for arthritis, back pain, falls, joint pain, joint swelling, muscle cramps, muscle weakness and myalgias.   Gastrointestinal:  Negative for bloating, abdominal pain, change in bowel habit, constipation, flatus, heartburn, hematemesis, hematochezia, hemorrhoids, jaundice, melena, nausea and vomiting.   Genitourinary:  Negative for bladder incontinence, decreased libido, frequency, hematuria, hesitancy and urgency.   Neurological:  Negative for brief paralysis, difficulty with concentration, excessive daytime sleepiness, dizziness, focal weakness, headaches, light-headedness, loss of balance, numbness, vertigo and weakness.   Psychiatric/Behavioral:  Negative for altered mental status, depression and memory loss. The patient  "does not have insomnia and is not nervous/anxious.    Allergic/Immunologic: Negative for environmental allergies, hives and persistent infections.         Objective:   Physical Exam  Constitutional:       General: He is not in acute distress.     Appearance: He is well-developed. He is not diaphoretic.      Comments: /70 (BP Location: Left arm, Patient Position: Sitting, BP Method: Large (Manual))   Pulse 85   Ht 5' 11" (1.803 m)   Wt 99.8 kg (220 lb)   SpO2 98%   BMI 30.68 kg/m²      HENT:      Head: Normocephalic and atraumatic.   Eyes:      General: Lids are normal. No scleral icterus.        Right eye: No discharge.      Conjunctiva/sclera: Conjunctivae normal.      Pupils: Pupils are equal, round, and reactive to light.   Neck:      Thyroid: No thyromegaly.      Vascular: No JVD.      Trachea: No tracheal deviation.   Cardiovascular:      Rate and Rhythm: Normal rate and regular rhythm.      Pulses: Intact distal pulses.           Carotid pulses are 2+ on the right side and 2+ on the left side.       Radial pulses are 2+ on the right side and 2+ on the left side.        Femoral pulses are 2+ on the right side and 2+ on the left side.       Popliteal pulses are 2+ on the right side and 2+ on the left side.        Dorsalis pedis pulses are 2+ on the right side and 2+ on the left side.        Posterior tibial pulses are 2+ on the right side and 2+ on the left side.      Heart sounds: Normal heart sounds, S1 normal and S2 normal. No murmur heard.     No friction rub. No gallop.   Pulmonary:      Effort: Pulmonary effort is normal. No respiratory distress.      Breath sounds: Normal breath sounds. No wheezing or rales.   Chest:      Chest wall: No tenderness.   Abdominal:      General: Bowel sounds are normal. There is no distension.      Palpations: Abdomen is soft. There is no hepatomegaly or mass.      Tenderness: There is no abdominal tenderness. There is no guarding or rebound.   Musculoskeletal:    "      General: No tenderness. Normal range of motion.      Cervical back: Normal range of motion and neck supple.   Lymphadenopathy:      Cervical: No cervical adenopathy.   Skin:     General: Skin is warm and dry.      Coloration: Skin is not pale.      Findings: No erythema or rash.   Neurological:      Mental Status: He is alert and oriented to person, place, and time.      Cranial Nerves: No cranial nerve deficit.      Coordination: Coordination normal.      Deep Tendon Reflexes: Reflexes are normal and symmetric.   Psychiatric:         Speech: Speech normal.         Behavior: Behavior normal.         Thought Content: Thought content normal.         Judgment: Judgment normal.            Assessment:      1. Abnormal ECG    2. Essential hypertension    3. RIVAS (dyspnea on exertion)    4. RBBB (right bundle branch block with left anterior fascicular block)          Plan:   EKG has not changed but now having some symptoms that may be related to CAD   Patient advised to modify risk factors such as weight, exercise, diet,  tobacco and alcohol exposure          Orders Placed This Encounter   Procedures    Nuclear Stress - Cardiology Interpreted    Echo      Follow up in about 3 months (around 2/7/2024).      Addendum:  Stress abnormal recommend [facility]  Risks and benefits explained

## 2023-12-18 ENCOUNTER — HOSPITAL ENCOUNTER (OUTPATIENT)
Facility: HOSPITAL | Age: 73
Discharge: HOME | End: 2023-12-18
Attending: INTERNAL MEDICINE | Admitting: INTERNAL MEDICINE
Payer: MEDICARE

## 2023-12-18 DIAGNOSIS — Q24.9 CARDIAC ABNORMALITY: ICD-10-CM

## 2023-12-18 DIAGNOSIS — I10 PRIMARY HYPERTENSION: ICD-10-CM

## 2023-12-18 DIAGNOSIS — Z01.818 PREOP TESTING: Primary | ICD-10-CM

## 2023-12-18 DIAGNOSIS — R94.31 ABNORMAL EKG: ICD-10-CM

## 2023-12-18 DIAGNOSIS — R00.2 PALPITATIONS: ICD-10-CM

## 2023-12-18 DIAGNOSIS — R94.39 ABNORMAL STRESS TEST: ICD-10-CM

## 2023-12-18 DIAGNOSIS — R06.09 DOE (DYSPNEA ON EXERTION): ICD-10-CM

## 2023-12-18 LAB
CATH EF QUANTITATIVE: 60 %
POC ACTIVATED CLOTTING TIME K: 255 SEC (ref 74–137)
POTASSIUM SERPL-SCNC: 4.7 MMOL/L (ref 3.5–5.1)
SAMPLE: ABNORMAL

## 2023-12-18 PROCEDURE — C1894 INTRO/SHEATH, NON-LASER: HCPCS | Mod: HCNC | Performed by: INTERNAL MEDICINE

## 2023-12-18 PROCEDURE — 63600175 PHARM REV CODE 636 W HCPCS: Performed by: INTERNAL MEDICINE

## 2023-12-18 PROCEDURE — 92928 PR STENT: ICD-10-PCS | Mod: RC,HCNC,, | Performed by: INTERNAL MEDICINE

## 2023-12-18 PROCEDURE — 99153 MOD SED SAME PHYS/QHP EA: CPT | Mod: HCNC | Performed by: INTERNAL MEDICINE

## 2023-12-18 PROCEDURE — 99152 MOD SED SAME PHYS/QHP 5/>YRS: CPT | Mod: HCNC | Performed by: INTERNAL MEDICINE

## 2023-12-18 PROCEDURE — C1887 CATHETER, GUIDING: HCPCS | Mod: HCNC | Performed by: INTERNAL MEDICINE

## 2023-12-18 PROCEDURE — 25500020 PHARM REV CODE 255: Performed by: INTERNAL MEDICINE

## 2023-12-18 PROCEDURE — 84132 ASSAY OF SERUM POTASSIUM: CPT | Performed by: INTERNAL MEDICINE

## 2023-12-18 PROCEDURE — 25000003 PHARM REV CODE 250: Performed by: INTERNAL MEDICINE

## 2023-12-18 PROCEDURE — C1725 CATH, TRANSLUMIN NON-LASER: HCPCS | Mod: HCNC | Performed by: INTERNAL MEDICINE

## 2023-12-18 PROCEDURE — 92928 PRQ TCAT PLMT NTRAC ST 1 LES: CPT | Mod: RC,HCNC,, | Performed by: INTERNAL MEDICINE

## 2023-12-18 PROCEDURE — 93458 PR CATH PLACE/CORON ANGIO, IMG SUPER/INTERP,W LEFT HEART VENTRICULOGRAPHY: ICD-10-PCS | Mod: 26,59,51,HCNC | Performed by: INTERNAL MEDICINE

## 2023-12-18 PROCEDURE — 99152 MOD SED SAME PHYS/QHP 5/>YRS: CPT | Mod: HCNC,,, | Performed by: INTERNAL MEDICINE

## 2023-12-18 PROCEDURE — 93458 L HRT ARTERY/VENTRICLE ANGIO: CPT | Mod: 26,59,51,HCNC | Performed by: INTERNAL MEDICINE

## 2023-12-18 PROCEDURE — C9600 PERC DRUG-EL COR STENT SING: HCPCS | Mod: RC,HCNC | Performed by: INTERNAL MEDICINE

## 2023-12-18 PROCEDURE — 85347 COAGULATION TIME ACTIVATED: CPT | Mod: HCNC | Performed by: INTERNAL MEDICINE

## 2023-12-18 PROCEDURE — 99152 PR MOD CONSCIOUS SEDATION, SAME PHYS, 5+ YRS, FIRST 15 MIN: ICD-10-PCS | Mod: HCNC,,, | Performed by: INTERNAL MEDICINE

## 2023-12-18 PROCEDURE — 93458 L HRT ARTERY/VENTRICLE ANGIO: CPT | Mod: 59,HCNC | Performed by: INTERNAL MEDICINE

## 2023-12-18 PROCEDURE — C1874 STENT, COATED/COV W/DEL SYS: HCPCS | Mod: HCNC | Performed by: INTERNAL MEDICINE

## 2023-12-18 PROCEDURE — C1769 GUIDE WIRE: HCPCS | Mod: HCNC | Performed by: INTERNAL MEDICINE

## 2023-12-18 DEVICE — EVEROLIMUS-ELUTING PLATINUM CHROMIUM CORONARY STENT SYSTEM
Type: IMPLANTABLE DEVICE | Site: HEART | Status: FUNCTIONAL
Brand: SYNERGY™ XD

## 2023-12-18 RX ORDER — VERAPAMIL HYDROCHLORIDE 2.5 MG/ML
INJECTION, SOLUTION INTRAVENOUS
Status: DISCONTINUED | OUTPATIENT
Start: 2023-12-18 | End: 2023-12-18 | Stop reason: HOSPADM

## 2023-12-18 RX ORDER — NAPROXEN SODIUM 220 MG/1
81 TABLET, FILM COATED ORAL DAILY
Status: DISCONTINUED | OUTPATIENT
Start: 2023-12-18 | End: 2023-12-18 | Stop reason: HOSPADM

## 2023-12-18 RX ORDER — SODIUM CHLORIDE 9 MG/ML
INJECTION, SOLUTION INTRAVENOUS CONTINUOUS
Status: ACTIVE | OUTPATIENT
Start: 2023-12-18 | End: 2023-12-18

## 2023-12-18 RX ORDER — ASPIRIN 325 MG
TABLET, DELAYED RELEASE (ENTERIC COATED) ORAL
Status: DISCONTINUED | OUTPATIENT
Start: 2023-12-18 | End: 2023-12-18 | Stop reason: HOSPADM

## 2023-12-18 RX ORDER — HEPARIN SODIUM 1000 [USP'U]/ML
INJECTION, SOLUTION INTRAVENOUS; SUBCUTANEOUS
Status: DISCONTINUED | OUTPATIENT
Start: 2023-12-18 | End: 2023-12-18 | Stop reason: HOSPADM

## 2023-12-18 RX ORDER — MIDAZOLAM HYDROCHLORIDE 1 MG/ML
INJECTION, SOLUTION INTRAMUSCULAR; INTRAVENOUS
Status: DISCONTINUED | OUTPATIENT
Start: 2023-12-18 | End: 2023-12-18 | Stop reason: HOSPADM

## 2023-12-18 RX ORDER — OXYCODONE HYDROCHLORIDE 5 MG/1
10 TABLET ORAL EVERY 4 HOURS PRN
Status: DISCONTINUED | OUTPATIENT
Start: 2023-12-18 | End: 2023-12-18 | Stop reason: HOSPADM

## 2023-12-18 RX ORDER — HYDROCODONE BITARTRATE AND ACETAMINOPHEN 5; 325 MG/1; MG/1
1 TABLET ORAL EVERY 4 HOURS PRN
Status: DISCONTINUED | OUTPATIENT
Start: 2023-12-18 | End: 2023-12-18 | Stop reason: HOSPADM

## 2023-12-18 RX ORDER — ACETAMINOPHEN 325 MG/1
650 TABLET ORAL EVERY 4 HOURS PRN
Status: DISCONTINUED | OUTPATIENT
Start: 2023-12-18 | End: 2023-12-18 | Stop reason: HOSPADM

## 2023-12-18 RX ORDER — SODIUM CHLORIDE 0.9 % (FLUSH) 0.9 %
10 SYRINGE (ML) INJECTION
Status: ACTIVE | OUTPATIENT
Start: 2023-12-18

## 2023-12-18 RX ORDER — ONDANSETRON 8 MG/1
8 TABLET, ORALLY DISINTEGRATING ORAL EVERY 8 HOURS PRN
Status: DISCONTINUED | OUTPATIENT
Start: 2023-12-18 | End: 2023-12-18 | Stop reason: HOSPADM

## 2023-12-18 RX ORDER — NITROGLYCERIN 5 MG/ML
INJECTION, SOLUTION INTRAVENOUS
Status: DISCONTINUED | OUTPATIENT
Start: 2023-12-18 | End: 2023-12-18 | Stop reason: HOSPADM

## 2023-12-18 RX ORDER — FENTANYL CITRATE 50 UG/ML
INJECTION, SOLUTION INTRAMUSCULAR; INTRAVENOUS
Status: DISCONTINUED | OUTPATIENT
Start: 2023-12-18 | End: 2023-12-18 | Stop reason: HOSPADM

## 2023-12-18 RX ORDER — ATORVASTATIN CALCIUM 10 MG/1
10 TABLET, FILM COATED ORAL NIGHTLY
Status: DISCONTINUED | OUTPATIENT
Start: 2023-12-18 | End: 2023-12-18 | Stop reason: HOSPADM

## 2023-12-18 RX ORDER — LIDOCAINE HYDROCHLORIDE 20 MG/ML
INJECTION, SOLUTION INFILTRATION; PERINEURAL
Status: DISCONTINUED | OUTPATIENT
Start: 2023-12-18 | End: 2023-12-18 | Stop reason: HOSPADM

## 2023-12-18 RX ORDER — NITROGLYCERIN 0.4 MG/1
0.4 TABLET SUBLINGUAL EVERY 5 MIN PRN
Status: DISCONTINUED | OUTPATIENT
Start: 2023-12-18 | End: 2023-12-18 | Stop reason: HOSPADM

## 2023-12-18 RX ORDER — ATROPINE SULFATE 0.1 MG/ML
0.5 INJECTION INTRAVENOUS
Status: DISCONTINUED | OUTPATIENT
Start: 2023-12-18 | End: 2023-12-18 | Stop reason: HOSPADM

## 2023-12-18 RX ORDER — DIPHENHYDRAMINE HCL 50 MG
50 CAPSULE ORAL ONCE
Status: COMPLETED | OUTPATIENT
Start: 2023-12-18 | End: 2023-12-18

## 2023-12-18 RX ADMIN — SODIUM CHLORIDE: 9 INJECTION, SOLUTION INTRAVENOUS at 12:12

## 2023-12-18 RX ADMIN — DIPHENHYDRAMINE HYDROCHLORIDE 50 MG: 50 CAPSULE ORAL at 12:12

## 2023-12-18 NOTE — BRIEF OP NOTE
<[facility] - Cath Lab ([facility])  Cardiology Department  Operative Note    SUMMARY     Date of Procedure: 12/18/2023     Procedure: Procedure(s) (LRB):  Left heart cath (Left)  Percutaneous coronary intervention (N/A)  Stent, Drug Eluting, Single Vessel, Coronary     Surgeon(s) and Role:     * Dylan Johnson MD - Primary    Assisting Surgeon: None    Pre-Operative Diagnosis: Abnormal stress test [R94.39]  Abnormal EKG [R94.31]  Primary hypertension [I10]  RIVAS (dyspnea on exertion) [R06.09]  Palpitations [R00.2]    Post-Operative Diagnosis: Post-Op Diagnosis Codes:     * Abnormal stress test [R94.39]     * Abnormal EKG [R94.31]     * Primary hypertension [I10]     * RIVAS (dyspnea on exertion) [R06.09]     * Palpitations [R00.2]    Anesthesia: RN IV Sedation    Technical Procedures Used: LHC, PCI of p RCA    Description of the Findings of the Procedure: P RCA 90% PCI with 3.5 x16 KHARI to 0%    Significant Surgical Tasks Conducted by the Assistant(s), if Applicable: none    Complications: No    Estimated Blood Loss (EBL): < 50 cc           Implants:   Implant Name Type Inv. Item Serial No.  Lot No. LRB No. Used Action   STENT SYNERGY XD [id number] - [id number] stent coronary KHARI - Balloon Exp STENT SYNERGY XD [id number]  [facility] [id number]  1 Implanted       Specimens:   Specimen (24h ago, onward)      None                    Condition: Good    Disposition: PACU - hemodynamically stable.    Attestation: I was present and scrubbed for the entire procedure.

## 2023-12-18 NOTE — PLAN OF CARE
Discharge instructions, medications and safety concerns rev'd w/ pt and daughter, LIS. Pt ambulatory in CVRU w/o complications or complaints. Left radial arterial site WDL, site w/o hematoma or bleeding, dressing is CDI.    IV removed, catheter intact, and dressing applied. Stressed importance of making and keeping all f/u appointments. Patient verbalized understanding and has no questions.Verified that patient has someone to drive patient home and instructed no driving for 24hrs. Pt wheeled to car. Left wrist site dressing dry, and intact with no signs of bleeding. No swelling or redness noted.

## 2023-12-18 NOTE — DISCHARGE INSTRUCTIONS
"  Post-op Heart Catheterization    1. DIET: It is advisable for you to follow a diet that limits the intake of salt, sugar, saturated fats and cholesterol.     2. DRIVING: Due to sedation you received during your procedure, DO NOT drive or operate machinery for 24 hours. Avoid making critical decisions or signing legal documents until tomorrow.    3. ACTIVITY: AVOID activities that require bending of the affected arm/wrist for 3 days and submerging the site in water for 3 days. REMOVE the dressing the day after  the procedure, and shower.  Apply a bandaid to site after shower.  WEAR wrist immobilizer until tomorrow night.    You may RESUME your normal activities or prescribed exercise program as instructed by your physician after 3 days.                                                                                                                 4. WOUND CARE: It is not unusual to have a small amount of bruising to appear at or near the puncture site. It is also common to have a tender "knot" develop beneath the skin at the puncture site of the wrist/arm. This is usually scar tissue and is not a cause for concern or alarm. This tender knot may take several weeks to fully resolve. The bruise will usually spread over several days. However, if the lump gets bigger, call your doctor immediately.    5. DISCOMFORT: For general discomfort at the puncture site, you may take 1 or 2 Acetaminophen (Tylenol) tablets every 4 - 6 hours as needed. (Do not take more than 4000 mg a day)    6. SIGNS AND SYMPTOMS TO REPORT:  Call your physician immediately if any of the following occur:                                            1. Loss of feeling, warmth or color to the affected arm/wrist                                                                                                          2. Mild beeding from the site                 3. Pain that is sudden, sharp or persistent in the affected arm/wrist                 4. " "Swelling or a change in "lump" size, increased redness or drainage at the puncture site                                                                               5. High fever (101 degrees or higher)    7. GO TO  THE EMERGENCY ROOM OR CALL 911 IF YOU HAVE: Chest pains or discomforts not relieved with 3 nitroglycerin doses (sublingual tablets or spray), numbness or severe pain of limb, if your limb becomes cold or discolored or if you develop uncontrolled bleeding from the puncture site (quickly apply firm, direct pressure above the site).  "

## 2023-12-22 ENCOUNTER — TELEPHONE (OUTPATIENT)
Dept: CARDIOLOGY | Facility: CLINIC | Age: 73
End: 2023-12-22
Payer: MEDICARE

## 2023-12-22 NOTE — TELEPHONE ENCOUNTER
----- Message from Leyda Skaggs sent at 12/22/2023 12:50 PM CST -----  Contact: Andrés  .Patient is calling to speak with the nurse regarding questions and concerns . Reports needing to know exactly what the patient can do  as in normal activities  . Please give patient a call back at .[phone]

## 2023-12-22 NOTE — TELEPHONE ENCOUNTER
Attempted unsuccessfully to reach patient. Left voicemail and Mychart message for patient to call back to discuss  questions

## 2023-12-28 NOTE — PROGRESS NOTES
Subjective:    Patient ID:  Andrés Casillas is a 73 y.o. male who presents for follow-up of Hospital Follow Up and Shortness of Breath      HPI: Mr. Andrés Casillas presents to the clinic for follow up after [facility]. He had 90% RCA and stent was placed. He is on ASA and Brilinta. he stated that he began with SOB after discharge and has been having trouble with it since then. It occurs at rest and with activity. He tried albuterol with minimal relief. He denied any chest pain. He stated that his energy level has actually improved since stenting on December 18, 2023. He believes that SOB is related to Brilinta. He also is having multiple bruises on arms and in the abdomen in spots where he injected testosterone. He denied any unusual external bleeding.  he denies any pain, bleeding, or discharge from the left wrist access site.  He denies any other unusual bleeding.  He stopped amlodipine some time ago and he is taking lisinopril with stable BP at home.     GAURANG:  He stated that he uses CPAP every night.  After discharge he noticed having more difficulty using his CPAP mask without feeling like he was smothering.  This is improving.    Last visit with Dr. Brewster on 11/7/23:  HPI73 yo WM with HTN and hx of abnormal EKG. Has had decrease exercise capacity and fatigue. No definite CP. Stopped his amlodipine because of feeling light headed and BP remains normal.   Assessment:      1. Abnormal ECG    2. Essential hypertension    3. RIVAS (dyspnea on exertion)    4. RBBB (right bundle branch block with left anterior fascicular block)       Plan:   EKG has not changed but now having some symptoms that may be related to CAD   Patient advised to modify risk factors such as weight, exercise, diet,  tobacco and alcohol exposure       Orders Placed This Encounter   Procedures    Nuclear Stress - Cardiology Interpreted    Echo   Follow up in about 3 months (around 2/7/2024).         Medications: he is not missing any  doses.  Exercise: he  rides bike sometimes - yesterday for 10-15 minutes  Tobacco: denies previous or current tobacco use   Alcohol: cut back on alcohol     Weight: 97.9 kg (215 lb 14.4 oz) he states that his daily weights has been stableBody mass index is 30.11 kg/m².   Wt Readings from Last 3 Encounters:   01/12/24 97.9 kg (215 lb 14.4 oz)   01/05/24 99.1 kg (218 lb 6.4 oz)   12/18/23 95.7 kg (211 lb)     BP log: none. He stated that his BP is good at home.      Review of Systems   Constitutional: Negative for chills, decreased appetite, fever, night sweats, weight gain and weight loss.   HENT:  Negative for congestion.    Cardiovascular:  Positive for dyspnea on exertion. Negative for chest pain, claudication, cyanosis, irregular heartbeat, leg swelling, near-syncope, orthopnea, palpitations, paroxysmal nocturnal dyspnea and syncope.   Respiratory:  Positive for shortness of breath. Negative for cough, hemoptysis, sputum production and wheezing.    Hematologic/Lymphatic: Negative for adenopathy and bleeding problem. Bruises/bleeds easily.   Skin:  Negative for color change and nail changes.   Gastrointestinal:  Negative for bloating, abdominal pain, change in bowel habit, heartburn, hematochezia, melena, nausea and vomiting.   Genitourinary:  Negative for hematuria.   Neurological:  Negative for dizziness and light-headedness.   Psychiatric/Behavioral:  Negative for altered mental status.        Objective:   Physical Exam  Constitutional:       General: He is not in acute distress.     Appearance: He is well-developed. He is obese. He is not diaphoretic.   HENT:      Head: Normocephalic and atraumatic.   Eyes:      General: No scleral icterus.     Conjunctiva/sclera: Conjunctivae normal.   Neck:      Thyroid: No thyromegaly.      Vascular: No carotid bruit or JVD.      Trachea: No tracheal deviation.   Cardiovascular:      Rate and Rhythm: Normal rate and regular rhythm.      Pulses: Intact distal pulses.       Heart sounds: Normal heart sounds. No murmur heard.     No friction rub. No gallop.   Pulmonary:      Effort: Pulmonary effort is normal. No respiratory distress.      Breath sounds: Normal breath sounds. No stridor. No wheezing, rhonchi or rales.   Chest:      Chest wall: No tenderness.   Abdominal:      General: Bowel sounds are normal. There is no distension.      Palpations: Abdomen is soft. There is no mass.      Tenderness: There is no abdominal tenderness. There is no guarding or rebound.   Musculoskeletal:         General: Normal range of motion.      Cervical back: Neck supple.      Comments: Left wrist access site without redness, tenderness, swelling, or drainage. There is no active external bleeding or hematoma. Radial and Ulnar pulse 2+. Skin warm/dry/pink. Appropriate response to tactile stimuli.  Multiple bruises noted on forearms. One very small round bruise on abdomen-about 1 cm-at injection site.   Lymphadenopathy:      Cervical: No cervical adenopathy.   Skin:     General: Skin is warm and dry.      Capillary Refill: Capillary refill takes less than 2 seconds.      Coloration: Skin is not pale.      Findings: No erythema or rash.      Comments: Platina   Neurological:      Mental Status: He is alert and oriented to person, place, and time.   Psychiatric:         Mood and Affect: Mood normal.        Latest Reference Range & Units 05/27/22 11:29 10/30/23 11:45 12/15/23 15:03 12/18/23 12:10   Sodium 136 - 145 mmol/L 137  140    Potassium 3.5 - 5.1 mmol/L 4.8  5.4 (H) 4.7   Chloride 95 - 110 mmol/L 102  106    CO2 23 - 29 mmol/L 25  24    Anion Gap 8 - 16 mmol/L 10  10    BUN 8 - 23 mg/dL 18  36 (H)    Creatinine 0.5 - 1.4 mg/dL 0.9  1.0    eGFR >60 mL/min/1.73 m^2   >60.0    eGFR if non African American >60 mL/min/1.73 m^2 >60.0      eGFR if African American >60 mL/min/1.73 m^2 >60.0      Glucose 70 - 110 mg/dL 94  102    Calcium 8.7 - 10.5 mg/dL 9.4  9.7    ALP 55 - 135 U/L 103      PROTEIN TOTAL 6.0 -  8.4 g/dL 6.7      Albumin 3.5 - 5.2 g/dL 4.2      BILIRUBIN TOTAL 0.1 - 1.0 mg/dL 0.5      AST 10 - 40 U/L 24      ALT 10 - 44 U/L 32      Cholesterol Total 120 - 199 mg/dL 174      HDL 40 - 75 mg/dL 51      HDL/Cholesterol Ratio 20.0 - 50.0 % 29.3      Non-HDL Cholesterol mg/dL 123      Total Cholesterol/HDL Ratio 2.0 - 5.0  3.4      Triglycerides 30 - 150 mg/dL 80      LDL Cholesterol 63.0 - 159.0 mg/dL 107.0      Vit D, 25-Hydroxy 30 - 96 ng/mL  72     Vitamin B-12 210 - 950 pg/mL  >2000 (H)     Hemoglobin A1C External 4.0 - 5.6 % 5.1      Estimated Avg Glucose 68 - 131 mg/dL 100      TSH 0.400 - 4.000 uIU/mL  1.746     (H): Data is abnormally high    [facility] on 12/18/23: Summary     The Prox RCA lesion was 90% stenosed with 0% stenosis post-intervention.    The ejection fraction was calculated to be 60%.    The pre-procedure left ventricular end diastolic pressure was 23.    The post-procedure left ventricular end diastolic pressure was 25.    Prox RCA lesion: A .    Prox RCA lesion: A .    Prox RCA lesion: A stent was successfully placed at 13 NAYELI for 20 sec.    The estimated blood loss was none.    The PCI was successful.     Assessment:      1. Status post cardiac catheterization    2. Coronary artery disease involving native coronary artery of native heart without angina pectoris    3. Primary hypertension    4. RBBB (right bundle branch block with left anterior fascicular block)    5. GAURANG (obstructive sleep apnea)      Plan:     Status post cardiac catheterization  -     clopidogreL (PLAVIX) 75 mg tablet; Take 1 tablet (75 mg total) by mouth once daily.  Dispense: 30 tablet; Refill: 11    Coronary artery disease involving native coronary artery of native heart without angina pectoris    Primary hypertension    RBBB (right bundle branch block with left anterior fascicular block)    GAURANG (obstructive sleep apnea)      Reviewed angiogram report with Mr. Casillas and answered all of his questions.  EKG today normal  sinus rhythm with right bundle branch block at 69 beats per minute.  Left anterior fascicular block voltage criteria for LVH.  Spoke with Dr. Johnson regarding his symptoms and Dr. Johnson advised to stop Brilinta and start Plavix.    Continue aspirin 81 mg p.o. q.day -CAD status post stenting of RCA on December 18, 2023. Report unusual bleeding.  Add Plavix 75 mg p.o. q.day S/P stenting of RCA  Stop Brilinta.  Call office if shortness of breath does not improve.  Continue lisinopril 40 mg p.o. q.day-hypertension.    Monitor blood pressure at home.  BP log.  Continue to use CPAP every night-obstructive sleep apnea.  Exercise recommended. Encouraged daily walking/ bike riding and advance as tolerated.  Whole foods diet recommended. Increase nonstarchy vegetables.  Recommend a statin given CAD.  Will defer to Dr. Brewster.  Follow up with Dr. Brewster on February 13, 2024 as planned or call sooner for any problems.  Iliana Schroeder NP  [facility]    This note has been prepared using a combination of MModaL dictation device and typing.  It has been checked for errors but some errors may still exist within the note as a result of speech recognition errors and/or typographical errors.

## 2023-12-30 NOTE — DISCHARGE SUMMARY
[facility] - Cath Lab (Hospital)  Discharge Summary      Admit Date: 12/18/2023    Discharge Date and Time: 12/18/2023  6:00 PM    Attending Physician: No att. providers found     Reason for Admission: [facility], dx: angina    Procedures Performed: Procedure(s) (LRB):  Left heart cath (Left)  Percutaneous coronary intervention (N/A)  Stent, Drug Eluting, Single Vessel, Coronary    Hospital Course (synopsis of major diagnoses, care, treatment, and services provided during the course of the hospital stay): Date of Procedure: 12/18/2023      Procedure: Procedure(s) (LRB):  Left heart cath (Left)  Percutaneous coronary intervention (N/A)  Stent, Drug Eluting, Single Vessel, Coronary      Surgeon(s) and Role:     * Dylan Johnson MD - Primary     Assisting Surgeon: None     Pre-Operative Diagnosis: Abnormal stress test [R94.39]  Abnormal EKG [R94.31]  Primary hypertension [I10]  RIVAS (dyspnea on exertion) [R06.09]  Palpitations [R00.2]     Post-Operative Diagnosis: Post-Op Diagnosis Codes:     * Abnormal stress test [R94.39]     * Abnormal EKG [R94.31]     * Primary hypertension [I10]     * RIVAS (dyspnea on exertion) [R06.09]     * Palpitations [R00.2]     Anesthesia: RN IV Sedation     Technical Procedures Used: LHC, PCI of p RCA     Description of the Findings of the Procedure: P RCA 90% PCI with 3.5 x16 KHARI to 0%     Significant Surgical Tasks Conducted by the Assistant(s), if Applicable: none     Complications: No     Estimated Blood Loss (EBL): < 50 cc     Goals of Care Treatment Preferences:         Consults: none    Significant Diagnostic Studies: Labs: All labs within the past 24 hours have been reviewed    Final Diagnoses:    Principal Problem: <principal problem not specified>   Secondary Diagnoses: There are no hospital problems to display for this patient.     Discharged Condition: good    Disposition: Home or Self Care    Follow Up/Patient Instructions:     Medications:  Reconciled Home Medications:       Medication List        Start taking these medications      ticagrelor 60 mg tablet  Commonly known as: BRILINTA  Take 1 tablet (60 mg total) by mouth 2 (two) times daily.            Ask your doctor about these medications      * albuterol 90 mcg/actuation inhaler  Commonly known as: PROVENTIL/VENTOLIN HFA  Inhale 2 puffs into the lungs every 6 (six) hours as needed for Wheezing.     * albuterol 90 mcg/actuation inhaler  Commonly known as: PROVENTIL/VENTOLIN HFA  TAKE 2 PUFFS BY MOUTH EVERY 6 HOURS AS NEEDED FOR WHEEZE     alfuzosin 10 mg Tb24  Commonly known as: UROXATRAL     ALPRAZolam 1 MG tablet  Commonly known as: XANAX  Take 1 tablet (1 mg total) by mouth 3 (three) times daily.     amLODIPine 10 MG tablet  Commonly known as: NORVASC  Take 10 mg by mouth.     buPROPion 300 MG 24 hr tablet  Commonly known as: WELLBUTRIN XL  Take 1 tablet (300 mg total) by mouth once daily.     cholecalciferol (vitamin D3) 25 mcg (1,000 unit) capsule  Commonly known as: VITAMIN D3  Take 1,000 Units by mouth.     co-enzyme Q-10 30 mg capsule  Take 30 mg by mouth once daily.     cyanocobalamin 100 MCG tablet  Commonly known as: VITAMIN B-12  Take 100 mcg by mouth.     DIINDOLYLMETHANE (BULK) MISC  100 mg by Other route.     docusate sodium 100 MG capsule  Commonly known as: COLACE  Take 1 capsule (100 mg total) by mouth 2 (two) times daily as needed for Constipation.     dorzolamide-timolol (PF) 2-0.5 % Dpet ophthalmic solution  Commonly known as: COSOPT PF     fish oil-omega-3 fatty acids 300-1,000 mg capsule  Take 1 capsule by mouth once daily.     furosemide 40 MG tablet  Commonly known as: LASIX  TAKE 1 TABLET BY MOUTH DAILY AS NEEDED FOR FLUID RETENSION     latanoprost 0.005 % ophthalmic solution  Place 1 drop into both eyes nightly.     lisinopriL 40 MG tablet  Commonly known as: PRINIVIL,ZESTRIL  TAKE 1 TABLET BY MOUTH EVERY DAY     magnesium oxide 400 mg (241.3 mg magnesium) tablet  Commonly known as: MAG-OX  Take 400 mg  by mouth once daily.     montelukast 10 mg tablet  Commonly known as: SINGULAIR  TAKE 1 TABLET BY MOUTH EVERY DAY IN THE EVENING     tadalafiL 5 MG tablet  Commonly known as: CIALIS  Take 5 mg by mouth once daily.     tamsulosin 0.4 mg Cap  Commonly known as: FLOMAX  0.4 mg.     terazosin 5 MG capsule  Commonly known as: HYTRIN  Take by mouth.     terbinafine  mg tablet  Commonly known as: LAMISIL  TAKE 1 TABLET BY MOUTH AS DIRECTED TAKE CONSECUTIVE 10 DAYS OF A MONTH X 3 MONTHS     testosterone cypionate 100 mg/mL injection  Commonly known as: DEPOTESTOTERONE CYPIONATE  Inject into the muscle.     zinc gluconate 50 mg tablet  Take 50 mg by mouth.      * This list has 2 medication(s) that are the same as other medications prescribed for you. Read the directions carefully, and ask your doctor or other care provider to review them with you.       No discharge procedures on file.   Follow-up Information       Iliana Schroeder NP Follow up in 1 week(s).    Specialty: Cardiology  Why: For wound re-check  Contact information:  [address] [address] [place] [address] [phone]

## 2024-01-03 ENCOUNTER — TELEPHONE (OUTPATIENT)
Dept: FAMILY MEDICINE | Facility: CLINIC | Age: 74
End: 2024-01-03
Payer: MEDICARE

## 2024-01-03 NOTE — TELEPHONE ENCOUNTER
----- Message from Pedro Mak sent at 1/3/2024  4:29 PM CST -----  Contact: Patient  Patient is requesting a call from the DrAlondra Reports needing some therapy and also needs to Thank him for the referral to cardio as he had 90% blockage. Please have  Call patient at .[phone]

## 2024-01-04 VITALS
WEIGHT: 211 LBS | BODY MASS INDEX: 29.54 KG/M2 | SYSTOLIC BLOOD PRESSURE: 141 MMHG | RESPIRATION RATE: 18 BRPM | DIASTOLIC BLOOD PRESSURE: 87 MMHG | HEIGHT: 71 IN | HEART RATE: 67 BPM | TEMPERATURE: 98 F | OXYGEN SATURATION: 98 %

## 2024-01-05 ENCOUNTER — OFFICE VISIT (OUTPATIENT)
Dept: FAMILY MEDICINE | Facility: CLINIC | Age: 74
End: 2024-01-05
Payer: MEDICARE

## 2024-01-05 ENCOUNTER — TELEPHONE (OUTPATIENT)
Dept: CARDIOLOGY | Facility: CLINIC | Age: 74
End: 2024-01-05
Payer: MEDICARE

## 2024-01-05 VITALS
HEART RATE: 77 BPM | HEIGHT: 71 IN | OXYGEN SATURATION: 95 % | DIASTOLIC BLOOD PRESSURE: 80 MMHG | SYSTOLIC BLOOD PRESSURE: 138 MMHG | BODY MASS INDEX: 30.57 KG/M2 | WEIGHT: 218.38 LBS

## 2024-01-05 DIAGNOSIS — Z79.899 ENCOUNTER FOR LONG-TERM (CURRENT) USE OF MEDICATIONS: ICD-10-CM

## 2024-01-05 DIAGNOSIS — F41.9 ANXIETY: Chronic | ICD-10-CM

## 2024-01-05 DIAGNOSIS — M72.2 PLANTAR FASCIITIS: ICD-10-CM

## 2024-01-05 DIAGNOSIS — Z95.5 STATUS POST CORONARY ARTERY STENT PLACEMENT: Primary | ICD-10-CM

## 2024-01-05 PROCEDURE — 1159F MED LIST DOCD IN RCRD: CPT | Mod: HCNC,CPTII,S$GLB, | Performed by: FAMILY MEDICINE

## 2024-01-05 PROCEDURE — 99999 PR PBB SHADOW E&M-EST. PATIENT-LVL V: CPT | Mod: PBBFAC,HCNC,, | Performed by: FAMILY MEDICINE

## 2024-01-05 PROCEDURE — 3075F SYST BP GE 130 - 139MM HG: CPT | Mod: HCNC,CPTII,S$GLB, | Performed by: FAMILY MEDICINE

## 2024-01-05 PROCEDURE — 3079F DIAST BP 80-89 MM HG: CPT | Mod: HCNC,CPTII,S$GLB, | Performed by: FAMILY MEDICINE

## 2024-01-05 PROCEDURE — 3008F BODY MASS INDEX DOCD: CPT | Mod: HCNC,CPTII,S$GLB, | Performed by: FAMILY MEDICINE

## 2024-01-05 PROCEDURE — 1101F PT FALLS ASSESS-DOCD LE1/YR: CPT | Mod: HCNC,CPTII,S$GLB, | Performed by: FAMILY MEDICINE

## 2024-01-05 PROCEDURE — 99214 OFFICE O/P EST MOD 30 MIN: CPT | Mod: HCNC,S$GLB,, | Performed by: FAMILY MEDICINE

## 2024-01-05 PROCEDURE — 1125F AMNT PAIN NOTED PAIN PRSNT: CPT | Mod: HCNC,CPTII,S$GLB, | Performed by: FAMILY MEDICINE

## 2024-01-05 PROCEDURE — 1160F RVW MEDS BY RX/DR IN RCRD: CPT | Mod: HCNC,CPTII,S$GLB, | Performed by: FAMILY MEDICINE

## 2024-01-05 PROCEDURE — 3288F FALL RISK ASSESSMENT DOCD: CPT | Mod: HCNC,CPTII,S$GLB, | Performed by: FAMILY MEDICINE

## 2024-01-05 RX ORDER — GUAIFENESIN 100 MG/5ML
81 LIQUID (ML) ORAL DAILY
COMMUNITY

## 2024-01-05 RX ORDER — ALPRAZOLAM 1 MG/1
1 TABLET ORAL 3 TIMES DAILY
Qty: 90 TABLET | Refills: 5 | Status: SHIPPED | OUTPATIENT
Start: 2024-01-05

## 2024-01-05 NOTE — ASSESSMENT & PLAN NOTE
Start night splint, stretching.  Patient will start new shoe inserts.  Information given on where to get these from.  Patient will follow-up with me if no improvement.  Sitter physical therapy and orthopedics/podiatry consult if needed.

## 2024-01-05 NOTE — TELEPHONE ENCOUNTER
Please advise  Dr. Moise is requesting clearance for patient to have a prostate biopsy. He will be under mac anesthesia and will need instructions on holding and restarting blood thinners.

## 2024-01-05 NOTE — PATIENT INSTRUCTIONS
Ketan Bowen,     If you are due for any health screening(s) below please notify me so we can arrange them to be ordered and scheduled. Most healthy patients at your age complete them, but you are free to accept or refuse.     If you can't do it, I'll definitely understand. If you can, I'd certainly appreciate it!    Tests to Keep You Healthy    Colon Cancer Screening: DUE  Last Blood Pressure <= 139/89 (1/5/2024): Yes      Its time for your colon cancer screening     Colorectal cancer is one of the leading causes of cancer death for men and women but it doesnt have to be. Screenings can prevent colorectal cancer or find it early enough to treat and cure the disease.     Our records indicate that you may be overdue for colon cancer screening. A colonoscopy or stool screening test can help identify patients at risk for developing colon cancer. Cancer screenings save lives, so schedule yours today to stay healthy.     A colonoscopy is the preferred test for detecting colon cancer. It is needed only once every 10 years if results are negative. While you are sedated, a flexible, lighted tube with a tiny camera is inserted into the rectum and advanced through the colon to look for cancers.     An alternative screening test that is used at home and returned to the lab may also be used. It detects hidden blood in bowel movements which could indicate cancer in the colon. If results are positive, you will need a colonoscopy to determine if the blood is a sign of cancer. This type of follow up (diagnostic) colonoscopy usually requires additional copays as required by your insurance provider.     If you recently had your colon cancer screening performed outside of [facility], please let your Health care team know so that they can update your health record. Please contact your PCP if you have any questions.

## 2024-01-05 NOTE — PROGRESS NOTES
PLAN:    Avoid anti-inflammatories and steroids due to recent stent placement.  Of note patient states he is needing a prostate biopsy.  This procedure is being delayed due to his recent stent placement in his heart and being on blood thinners.  Patient will follow-up with Cardiology.  Problem List Items Addressed This Visit       Anxiety (Chronic)     Chronic. Stable. Taking Xanax as prescribed. Tolerating medication well with no SE reported. Requesting refills.  reviewed. Refill Xanax for six months.  Follow-up in six months.  Discussed condition course and signs and symptoms to expect.  Patient advised of risk and benefits of medication use.  ER precautions.  Call MD or follow-up to clinic if not improving or worsening symptoms.    -discussed anxiety condition course  -discussed SSRI/SNRI as first-line treatment for this condition  -discussed risk of discontinuing this medication without tapering  -patient was educated, advised of side effects, and all questions were answered.  Patient voiced understanding  -patient will follow up routinely and notify us if having any side effects or worsening or persistent symptoms. Denies SI/HI. ER precautions were given.         Relevant Medications    ALPRAZolam (XANAX) 1 MG tablet    Encounter for long-term (current) use of medications (Chronic)     Complete history and physical was completed today.  Complete and thorough medication reconciliation was performed.  Discussed risks and benefits of medications.  Advised patient on orders and health maintenance.  We discussed old records and old labs if available.  Will request any records not available through epic.  Continue current medications listed on your summary sheet.           Relevant Medications    ALPRAZolam (XANAX) 1 MG tablet    Status post coronary artery stent placement - Primary     Continue medications by Cardiology.  Follow-up with Cardiology.  ER precautions for severe symptoms.  Monitoring labs.   Controlling blood pressure.         Plantar fasciitis     Start night splint, stretching.  Patient will start new shoe inserts.  Information given on where to get these from.  Patient will follow-up with me if no improvement.  Sitter physical therapy and orthopedics/podiatry consult if needed.          Future Appointments       Date Provider Specialty Appt Notes    1/12/2024 Iliana Schroeder NP Cardiology 1-2wk [facility]/cath [facility]/    2/13/2024 Loy Brewsetr Jr., MD Cardiology 3 month f/u           Medication Management for assessment above:   Medication List with Changes/Refills   Current Medications    ALBUTEROL (PROVENTIL/VENTOLIN HFA) 90 MCG/ACTUATION INHALER    Inhale 2 puffs into the lungs every 6 (six) hours as needed for Wheezing.    ALBUTEROL (PROVENTIL/VENTOLIN HFA) 90 MCG/ACTUATION INHALER    TAKE 2 PUFFS BY MOUTH EVERY 6 HOURS AS NEEDED FOR WHEEZE    ALFUZOSIN (UROXATRAL) 10 MG TB24        AMLODIPINE (NORVASC) 10 MG TABLET    Take 10 mg by mouth.    ASPIRIN 81 MG CHEW    Take 81 mg by mouth once daily.    BUPROPION (WELLBUTRIN XL) 300 MG 24 HR TABLET    Take 1 tablet (300 mg total) by mouth once daily.    CHOLECALCIFEROL, VITAMIN D3, (VITAMIN D3) 25 MCG (1,000 UNIT) CAPSULE    Take 1,000 Units by mouth.    CO-ENZYME Q-10 30 MG CAPSULE    Take 30 mg by mouth once daily.     CYANOCOBALAMIN (VITAMIN B-12) 100 MCG TABLET    Take 100 mcg by mouth.    DIINDOLYLMETHANE, BULK, MISC    100 mg by Other route.    DOCUSATE SODIUM (COLACE) 100 MG CAPSULE    Take 1 capsule (100 mg total) by mouth 2 (two) times daily as needed for Constipation.    DORZOLAMIDE-TIMOLOL, PF, (COSOPT PF) 2-0.5 % DPET OPHTHALMIC SOLUTION        FISH OIL-OMEGA-3 FATTY ACIDS 300-1,000 MG CAPSULE    Take 1 capsule by mouth once daily.     FUROSEMIDE (LASIX) 40 MG TABLET    TAKE 1 TABLET BY MOUTH DAILY AS NEEDED FOR FLUID RETENSION    LATANOPROST 0.005 % OPHTHALMIC SOLUTION    Place 1 drop into both eyes nightly.    LISINOPRIL  (PRINIVIL,ZESTRIL) 40 MG TABLET    TAKE 1 TABLET BY MOUTH EVERY DAY    MAGNESIUM OXIDE (MAG-OX) 400 MG (241.3 MG MAGNESIUM) TABLET    Take 400 mg by mouth once daily.     MONTELUKAST (SINGULAIR) 10 MG TABLET    TAKE 1 TABLET BY MOUTH EVERY DAY IN THE EVENING    TADALAFIL (CIALIS) 5 MG TABLET    Take 5 mg by mouth once daily.    TAMSULOSIN (FLOMAX) 0.4 MG CP24    0.4 mg.     TERAZOSIN (HYTRIN) 5 MG CAPSULE    Take by mouth.    TERBINAFINE HCL (LAMISIL) 250 MG TABLET    TAKE 1 TABLET BY MOUTH AS DIRECTED TAKE CONSECUTIVE 10 DAYS OF A MONTH X 3 MONTHS    TESTOSTERONE CYPIONATE (DEPOTESTOTERONE CYPIONATE) 100 MG/ML INJECTION    Inject into the muscle.    TICAGRELOR (BRILINTA) 60 MG TABLET    Take 1 tablet (60 mg total) by mouth 2 (two) times daily.    ZINC GLUCONATE 50 MG TABLET    Take 50 mg by mouth.   Changed and/or Refilled Medications    Modified Medication Previous Medication    ALPRAZOLAM (XANAX) 1 MG TABLET ALPRAZolam (XANAX) 1 MG tablet       Take 1 tablet (1 mg total) by mouth 3 (three) times daily.    Take 1 tablet (1 mg total) by mouth 3 (three) times daily.       Pedro Pablo Dc M.D.  ==========================================================================  Subjective:   Patient ID: Andrés Casillas is a 73 y.o. male.  has a past medical history of Anxiety, BPH (benign prostatic hypertrophy), DDD (degenerative disc disease), lumbar, Hypertension, and GAURANG (obstructive sleep apnea).   Chief Complaint: Foot Pain      Problem List Items Addressed This Visit       Anxiety (Chronic)    Overview     January 2024:  Patient reports compliance with medications.  No side effects reported.  Symptoms are controlled.  Patient has increased anxiety about his health.  Recently had stent placed in his heart.    Chronic.  August 2023: Patient here for medication refill.  Reports compliance.  No side effects reported.  Symptoms are controlled.  Stable.  Patient on chronic benzodiazepine from previous PCP.  Transitioning  care to me.The patient was checked in the [facility] Board of Pharmacy's  Prescription Monitoring Program. There appears to be no incongruities with the patient's verbalized history.   No abuse suspected.  February 2021:  Patient had PVCs on cardiac workup.  Patient reports cardiology has adjusted medications that he is doing much better.  Blood pressure has been well controlled.  May 2022:  Patient is here for medication refills.  He continues to have symptoms that are intermittently controlled with medication.         Current Assessment & Plan     Chronic. Stable. Taking Xanax as prescribed. Tolerating medication well with no SE reported. Requesting refills.  reviewed. Refill Xanax for six months.  Follow-up in six months.  Discussed condition course and signs and symptoms to expect.  Patient advised of risk and benefits of medication use.  ER precautions.  Call MD or follow-up to clinic if not improving or worsening symptoms.    -discussed anxiety condition course  -discussed SSRI/SNRI as first-line treatment for this condition  -discussed risk of discontinuing this medication without tapering  -patient was educated, advised of side effects, and all questions were answered.  Patient voiced understanding  -patient will follow up routinely and notify us if having any side effects or worsening or persistent symptoms. Denies SI/HI. ER precautions were given.         Encounter for long-term (current) use of medications (Chronic)    Overview     January 2024: Reviewed labs.  August 2023: Reviewed labs.  Initial HPI:  CHRONIC. Stable. Compliant with medications for managed conditions. See medication list. No SE reported. Routine lab analysis is being monitored. Refills were addressed.February 2021:CHRONIC. Stable. Compliant with medications for managed conditions. See medication list. No SE reported. Routine lab analysis is being monitored. Refills were addressed.  October 2021:  Reviewed labs.  May 2022:   Reviewed labs.  Lab Results   Component Value Date    WBC 5.54 12/15/2023    HGB 15.3 12/15/2023    HCT 44.1 12/15/2023    MCV 94 12/15/2023     12/15/2023       Chemistry        Component Value Date/Time     12/15/2023 1503    K 4.7 12/18/2023 1210     12/15/2023 1503    CO2 24 12/15/2023 1503    BUN 36 (H) 12/15/2023 1503    CREATININE 1.0 12/15/2023 1503     12/15/2023 1503        Component Value Date/Time    CALCIUM 9.7 12/15/2023 1503    ALKPHOS 103 05/27/2022 1129    AST 24 05/27/2022 1129    ALT 32 05/27/2022 1129    BILITOT 0.5 05/27/2022 1129    ESTGFRAFRICA >60.0 05/27/2022 1129    EGFRNONAA >60.0 05/27/2022 1129        Lab Results   Component Value Date    TSH 1.746 10/30/2023    D2MUBHQ 6.5 06/30/2011          Current Assessment & Plan     Complete history and physical was completed today.  Complete and thorough medication reconciliation was performed.  Discussed risks and benefits of medications.  Advised patient on orders and health maintenance.  We discussed old records and old labs if available.  Will request any records not available through epic.  Continue current medications listed on your summary sheet.           Status post coronary artery stent placement - Primary    Overview     Patient with recent stent placed.  Cardiac catheterization    The Prox RCA lesion was 90% stenosed with 0% stenosis   post-intervention.    The ejection fraction was calculated to be 60%.    The pre-procedure left ventricular end diastolic pressure was 23.    The post-procedure left ventricular end diastolic pressure was 25.    Prox RCA lesion: A .    Prox RCA lesion: A .    Prox RCA lesion: A stent was successfully placed at 13 NAYELI for 20 sec.    The estimated blood loss was none.    The PCI was successful.    The procedure log was documented by Documenter: Nicola Lizarraga RN and   verified by Dylan Johnson MD.    Date: 12/18/2023  Time: 6:35 PM             Current Assessment & Plan      Continue medications by Cardiology.  Follow-up with Cardiology.  ER precautions for severe symptoms.  Monitoring labs.  Controlling blood pressure.         Plantar fasciitis    Overview     New problem.  Patient having issue with heel pain.  He is unable to exercise because of this condition.         Current Assessment & Plan     Start night splint, stretching.  Patient will start new shoe inserts.  Information given on where to get these from.  Patient will follow-up with me if no improvement.  Sitter physical therapy and orthopedics/podiatry consult if needed.             Review of patient's allergies indicates:   Allergen Reactions    No known drug allergies      Current Outpatient Medications   Medication Instructions    albuterol (PROVENTIL/VENTOLIN HFA) 90 mcg/actuation inhaler 2 puffs, Inhalation, Every 6 hours PRN    albuterol (PROVENTIL/VENTOLIN HFA) 90 mcg/actuation inhaler TAKE 2 PUFFS BY MOUTH EVERY 6 HOURS AS NEEDED FOR WHEEZE    alfuzosin (UROXATRAL) 10 mg Tb24 No dose, route, or frequency recorded.    ALPRAZolam (XANAX) 1 mg, Oral, 3 times daily    amLODIPine (NORVASC) 10 mg, Oral    aspirin 81 mg, Oral, Daily    buPROPion (WELLBUTRIN XL) 300 MG 24 hr tablet Take 1 tablet (300 mg total) by mouth once daily.    cholecalciferol (vitamin D3) (VITAMIN D3) 1,000 Units, Oral    co-enzyme Q-10 30 mg, Oral, Daily    cyanocobalamin (VITAMIN B-12) 100 mcg, Oral    DIINDOLYLMETHANE, BULK, MISC 100 mg, Other    docusate sodium (COLACE) 100 mg, Oral, 2 times daily PRN    dorzolamide-timolol, PF, (COSOPT PF) 2-0.5 % Dpet ophthalmic solution No dose, route, or frequency recorded.    fish oil-omega-3 fatty acids 300-1,000 mg capsule 1 capsule, Oral, Daily    furosemide (LASIX) 40 MG tablet TAKE 1 TABLET BY MOUTH DAILY AS NEEDED FOR FLUID RETENSION    latanoprost 0.005 % ophthalmic solution 1 drop, Both Eyes, Nightly    lisinopriL (PRINIVIL,ZESTRIL) 40 MG tablet TAKE 1 TABLET BY MOUTH EVERY DAY    magnesium oxide  "(MAG-OX) 400 mg, Oral, Daily    montelukast (SINGULAIR) 10 mg tablet TAKE 1 TABLET BY MOUTH EVERY DAY IN THE EVENING    tadalafiL (CIALIS) 5 mg, Oral, Daily    tamsulosin (FLOMAX) 0.4 mg    terazosin (HYTRIN) 5 MG capsule Oral    terbinafine HCL (LAMISIL) 250 mg tablet TAKE 1 TABLET BY MOUTH AS DIRECTED TAKE CONSECUTIVE 10 DAYS OF A MONTH X 3 MONTHS    testosterone cypionate (DEPOTESTOTERONE CYPIONATE) 100 mg/mL injection Intramuscular    ticagrelor (BRILINTA) 60 mg, Oral, 2 times daily    zinc gluconate 50 mg, Oral      I have reviewed the PMH, social history, FamilyHx, surgical history, allergies and medications documented / confirmed by the patient at the time of this visit.  Review of Systems   Constitutional:  Positive for fatigue. Negative for chills, fever and unexpected weight change.   HENT:  Negative for ear pain and sore throat.    Eyes:  Negative for redness and visual disturbance.   Respiratory:  Negative for cough and shortness of breath.    Cardiovascular:  Negative for chest pain and palpitations.   Gastrointestinal:  Negative for nausea and vomiting.   Endocrine: Negative for cold intolerance and heat intolerance.   Genitourinary:  Negative for difficulty urinating and hematuria.   Musculoskeletal:  Positive for arthralgias. Negative for myalgias.   Skin:  Negative for rash and wound.   Allergic/Immunologic: Negative for environmental allergies and food allergies.   Neurological:  Negative for weakness and headaches.   Hematological:  Negative for adenopathy. Does not bruise/bleed easily.   Psychiatric/Behavioral:  Negative for sleep disturbance. The patient is not nervous/anxious.      Objective:   /80   Pulse 77   Ht 5' 11" (1.803 m)   Wt 99.1 kg (218 lb 6.4 oz)   SpO2 95%   BMI 30.46 kg/m²   Physical Exam  Vitals and nursing note reviewed.   Constitutional:       General: He is not in acute distress.     Appearance: He is well-developed. He is not diaphoretic.   HENT:      Head: " Normocephalic and atraumatic.      Right Ear: External ear normal.      Left Ear: External ear normal.      Nose: Nose normal. No rhinorrhea.   Eyes:      Extraocular Movements: Extraocular movements intact.      Pupils: Pupils are equal, round, and reactive to light.   Cardiovascular:      Rate and Rhythm: Normal rate.      Pulses: Normal pulses.   Pulmonary:      Effort: Pulmonary effort is normal. No respiratory distress.      Breath sounds: Normal breath sounds.   Abdominal:      General: Bowel sounds are normal.      Palpations: Abdomen is soft.   Musculoskeletal:         General: No tenderness or deformity. Normal range of motion.      Cervical back: Normal range of motion and neck supple.      Right lower leg: No edema.      Left lower leg: No edema.   Skin:     General: Skin is warm and dry.      Capillary Refill: Capillary refill takes less than 2 seconds.      Findings: No rash.   Neurological:      General: No focal deficit present.      Mental Status: He is alert and oriented to person, place, and time. Mental status is at baseline.      Cranial Nerves: No cranial nerve deficit.      Motor: No weakness.      Gait: Gait normal.   Psychiatric:         Attention and Perception: He is attentive.         Mood and Affect: Mood normal. Mood is not anxious or depressed. Affect is not labile, blunt, angry or inappropriate.         Speech: He is communicative. Speech is not rapid and pressured, delayed, slurred or tangential.         Behavior: Behavior normal. Behavior is not agitated, slowed, aggressive, withdrawn, hyperactive or combative.         Thought Content: Thought content normal. Thought content is not paranoid or delusional. Thought content does not include homicidal or suicidal ideation. Thought content does not include homicidal or suicidal plan.         Cognition and Memory: Memory is not impaired.         Judgment: Judgment normal. Judgment is not impulsive or inappropriate.      tenderness on heel  to palpation    Assessment:     1. Status post coronary artery stent placement    2. Encounter for long-term (current) use of medications    3. Anxiety    4. Plantar fasciitis      MDM:   Moderate medical complexity.  Moderate risk.  Total time: 33 minutes.  This includes total time spent on the encounter, which includes face to face time and non-face to face time preparing to see the patient (eg, review of previous medical records, tests), Obtaining and/or reviewing separately obtained history, documenting clinical information in the electronic or other health record, independently interpreting results (not separately reported)/communicating results to the patient/family/caregiver, and/or care coordination (not separately reported).    I have Reviewed and summarized old records.  I have performed thorough medication reconciliation today and discussed risk and benefits of medications.  I have reviewed labs and discussed with patient.  All questions were answered.  I am requesting old records and will review them once they are available.  Cardiology  The patient was checked in the [facility] Board of Pharmacy's  Prescription Monitoring Program. There appears to be no incongruities with the patient's verbalized history.       I have signed for the following orders AND/OR meds.  No orders of the defined types were placed in this encounter.    Medications Ordered This Encounter   Medications    ALPRAZolam (XANAX) 1 MG tablet     Sig: Take 1 tablet (1 mg total) by mouth 3 (three) times daily.     Dispense:  90 tablet     Refill:  5     This request is for a new prescription for a controlled substance as required by Federal/State law.        Follow up in about 6 months (around 7/5/2024), or if symptoms worsen or fail to improve, for Med refills.  Future Appointments       Date Provider Specialty Appt Notes    1/12/2024 Iliana Schroeder NP Cardiology 1-2wk [facility]/cath [facility]/    2/13/2024 Loy Brewster Jr., MD  Cardiology 3 month f/u          If no improvement in symptoms or symptoms worsen, advised to call/follow-up at clinic or go to ER. Patient voiced understanding and all questions/concerns were addressed.   DISCLAIMER: This note was compiled by using a speech recognition dictation system and therefore please be aware that typographical / speech recognition errors can and do occur.  Please contact me if you see any errors specifically.    Pedro Pablo Dc M.D.       Office: [phone] [id number] [place] [address]  FAX: [phone]

## 2024-01-05 NOTE — ASSESSMENT & PLAN NOTE
Continue medications by Cardiology.  Follow-up with Cardiology.  ER precautions for severe symptoms.  Monitoring labs.  Controlling blood pressure.

## 2024-01-12 ENCOUNTER — OFFICE VISIT (OUTPATIENT)
Dept: CARDIOLOGY | Facility: CLINIC | Age: 74
End: 2024-01-12
Payer: MEDICARE

## 2024-01-12 ENCOUNTER — HOSPITAL ENCOUNTER (OUTPATIENT)
Dept: CARDIOLOGY | Facility: HOSPITAL | Age: 74
Discharge: HOME | End: 2024-01-12
Payer: MEDICARE

## 2024-01-12 VITALS
HEART RATE: 72 BPM | DIASTOLIC BLOOD PRESSURE: 78 MMHG | HEIGHT: 71 IN | BODY MASS INDEX: 30.22 KG/M2 | OXYGEN SATURATION: 97 % | WEIGHT: 215.88 LBS | SYSTOLIC BLOOD PRESSURE: 128 MMHG

## 2024-01-12 DIAGNOSIS — I45.2 RBBB (RIGHT BUNDLE BRANCH BLOCK WITH LEFT ANTERIOR FASCICULAR BLOCK): ICD-10-CM

## 2024-01-12 DIAGNOSIS — R06.02 SHORTNESS OF BREATH: Primary | ICD-10-CM

## 2024-01-12 DIAGNOSIS — I25.10 CORONARY ARTERY DISEASE INVOLVING NATIVE CORONARY ARTERY OF NATIVE HEART WITHOUT ANGINA PECTORIS: ICD-10-CM

## 2024-01-12 DIAGNOSIS — I10 PRIMARY HYPERTENSION: ICD-10-CM

## 2024-01-12 DIAGNOSIS — Z98.890 STATUS POST CARDIAC CATHETERIZATION: Primary | ICD-10-CM

## 2024-01-12 DIAGNOSIS — R06.02 SHORTNESS OF BREATH: ICD-10-CM

## 2024-01-12 DIAGNOSIS — G47.33 OSA (OBSTRUCTIVE SLEEP APNEA): ICD-10-CM

## 2024-01-12 PROCEDURE — 99214 OFFICE O/P EST MOD 30 MIN: CPT | Mod: HCNC,S$GLB,, | Performed by: NURSE PRACTITIONER

## 2024-01-12 PROCEDURE — 3078F DIAST BP <80 MM HG: CPT | Mod: HCNC,CPTII,S$GLB, | Performed by: NURSE PRACTITIONER

## 2024-01-12 PROCEDURE — 1101F PT FALLS ASSESS-DOCD LE1/YR: CPT | Mod: HCNC,CPTII,S$GLB, | Performed by: NURSE PRACTITIONER

## 2024-01-12 PROCEDURE — 1160F RVW MEDS BY RX/DR IN RCRD: CPT | Mod: HCNC,CPTII,S$GLB, | Performed by: NURSE PRACTITIONER

## 2024-01-12 PROCEDURE — 3074F SYST BP LT 130 MM HG: CPT | Mod: HCNC,CPTII,S$GLB, | Performed by: NURSE PRACTITIONER

## 2024-01-12 PROCEDURE — 1159F MED LIST DOCD IN RCRD: CPT | Mod: HCNC,CPTII,S$GLB, | Performed by: NURSE PRACTITIONER

## 2024-01-12 PROCEDURE — 93010 EKG 12-LEAD: ICD-10-PCS | Mod: HCNC,,, | Performed by: STUDENT IN AN ORGANIZED HEALTH CARE EDUCATION/TRAINING PROGRAM

## 2024-01-12 PROCEDURE — 3008F BODY MASS INDEX DOCD: CPT | Mod: HCNC,CPTII,S$GLB, | Performed by: NURSE PRACTITIONER

## 2024-01-12 PROCEDURE — 99999 PR PBB SHADOW E&M-EST. PATIENT-LVL V: CPT | Mod: PBBFAC,HCNC,, | Performed by: NURSE PRACTITIONER

## 2024-01-12 PROCEDURE — 1126F AMNT PAIN NOTED NONE PRSNT: CPT | Mod: HCNC,CPTII,S$GLB, | Performed by: NURSE PRACTITIONER

## 2024-01-12 PROCEDURE — 93010 ELECTROCARDIOGRAM REPORT: CPT | Mod: HCNC,,, | Performed by: STUDENT IN AN ORGANIZED HEALTH CARE EDUCATION/TRAINING PROGRAM

## 2024-01-12 PROCEDURE — 93005 ELECTROCARDIOGRAM TRACING: CPT | Mod: PO

## 2024-01-12 PROCEDURE — 3288F FALL RISK ASSESSMENT DOCD: CPT | Mod: HCNC,CPTII,S$GLB, | Performed by: NURSE PRACTITIONER

## 2024-01-12 RX ORDER — CLOPIDOGREL BISULFATE 75 MG/1
75 TABLET ORAL DAILY
Qty: 30 TABLET | Refills: 11 | Status: SHIPPED | OUTPATIENT
Start: 2024-01-12 | End: 2025-01-11

## 2024-01-12 NOTE — Clinical Note
I saw this patient post angiogram and stenting of his RCA.  He complained of shortness of breath it was fairly persistent and felt that it was related to the Brilinta.  He also had multiple bruises on his forearm and 1 small bruise on his abdomen related to a testosterone injection.  I contacted Dr. Johnson and he recommended stopping Brilinta and starting Plavix.  I told the patient to contact us if symptoms do not improve.  He has an appointment with you on February 13, 2024.  Also given CAD, I think we would want to start a statin but I will defer to you.

## 2024-01-16 ENCOUNTER — TELEPHONE (OUTPATIENT)
Dept: CARDIOLOGY | Facility: CLINIC | Age: 74
End: 2024-01-16
Payer: MEDICARE

## 2024-01-16 ENCOUNTER — LAB VISIT (OUTPATIENT)
Dept: LAB | Facility: HOSPITAL | Age: 74
End: 2024-01-16
Attending: INTERNAL MEDICINE
Payer: MEDICARE

## 2024-01-16 DIAGNOSIS — R23.3 ABNORMAL BRUISING: Primary | ICD-10-CM

## 2024-01-16 DIAGNOSIS — R23.3 ABNORMAL BRUISING: ICD-10-CM

## 2024-01-16 LAB
BASOPHILS # BLD AUTO: 0.04 K/UL (ref 0–0.2)
BASOPHILS NFR BLD: 0.8 % (ref 0–1.9)
DIFFERENTIAL METHOD BLD: ABNORMAL
EOSINOPHIL # BLD AUTO: 0.2 K/UL (ref 0–0.5)
EOSINOPHIL NFR BLD: 4.6 % (ref 0–8)
ERYTHROCYTE [DISTWIDTH] IN BLOOD BY AUTOMATED COUNT: 13.2 % (ref 11.5–14.5)
HCT VFR BLD AUTO: 39.8 % (ref 40–54)
HGB BLD-MCNC: 13.2 G/DL (ref 14–18)
IMM GRANULOCYTES # BLD AUTO: 0.01 K/UL (ref 0–0.04)
IMM GRANULOCYTES NFR BLD AUTO: 0.2 % (ref 0–0.5)
LYMPHOCYTES # BLD AUTO: 1.7 K/UL (ref 1–4.8)
LYMPHOCYTES NFR BLD: 35.1 % (ref 18–48)
MCH RBC QN AUTO: 31.1 PG (ref 27–31)
MCHC RBC AUTO-ENTMCNC: 33.2 G/DL (ref 32–36)
MCV RBC AUTO: 94 FL (ref 82–98)
MONOCYTES # BLD AUTO: 0.6 K/UL (ref 0.3–1)
MONOCYTES NFR BLD: 12.2 % (ref 4–15)
NEUTROPHILS # BLD AUTO: 2.3 K/UL (ref 1.8–7.7)
NEUTROPHILS NFR BLD: 47.1 % (ref 38–73)
NRBC BLD-RTO: 0 /100 WBC
PLATELET # BLD AUTO: 216 K/UL (ref 150–450)
PMV BLD AUTO: 11.1 FL (ref 9.2–12.9)
RBC # BLD AUTO: 4.24 M/UL (ref 4.6–6.2)
WBC # BLD AUTO: 4.82 K/UL (ref 3.9–12.7)

## 2024-01-16 PROCEDURE — 36415 COLL VENOUS BLD VENIPUNCTURE: CPT | Mod: HCNC,PO | Performed by: INTERNAL MEDICINE

## 2024-01-16 PROCEDURE — 85025 COMPLETE CBC W/AUTO DIFF WBC: CPT | Mod: HCNC | Performed by: INTERNAL MEDICINE

## 2024-01-17 ENCOUNTER — PATIENT MESSAGE (OUTPATIENT)
Dept: CARDIOLOGY | Facility: CLINIC | Age: 74
End: 2024-01-17
Payer: MEDICARE

## 2024-01-18 ENCOUNTER — TELEPHONE (OUTPATIENT)
Dept: CARDIOLOGY | Facility: CLINIC | Age: 74
End: 2024-01-18
Payer: MEDICARE

## 2024-01-18 NOTE — TELEPHONE ENCOUNTER
Spoke with pt and informed him of CBC results. Patient verbalized understanding and will call back with any further questions or concerns.     ----- Message from Iliana Schroeder NP sent at 1/18/2024  4:35 PM CST -----  I see. I thought he was asking me to consider getting a CBC. He does have very mild anemia now. It could be related to the procedure. So as long as he is improving, there is nothing to do but watch. I assume that Dr. Brewster will be calling him with his interpretation soon.  Iliana  ----- Message -----  From: Nessa Contreras LPN  Sent: 1/18/2024   3:43 PM CST  To: Iliana Schroeder NP    Patient states that he believes his SOB is a little better since making the med change. States that the bruising appears to be slowly healing. Dr. Brewster ordered CBC and pt had this drawn 1/16/24. Please review lab result and advise.  ----- Message -----  From: Iliana Schroeder NP  Sent: 1/18/2024   2:52 PM CST  To: Nessa Contreras LPN    Please check on this patient and see if his SOB improved after stopping Brilinta and starting Plavix. Ask also about bruising and bleeding-he had bruises on arms and abdomen. If they are unchanged or any worse, we will get CBC. Let me know. Thanks.  Iliana

## 2024-01-19 RX ORDER — MECLIZINE HYDROCHLORIDE 25 MG/1
25 TABLET ORAL 3 TIMES DAILY PRN
Qty: 30 TABLET | Refills: 0 | Status: SHIPPED | OUTPATIENT
Start: 2024-01-19

## 2024-01-19 NOTE — TELEPHONE ENCOUNTER
----- Message from Diamone Speed sent at 1/19/2024 12:00 PM CST -----  Regarding: self  Type: Patient Call Back       Who called: self        What is the request in detail: pt stated that he have vertigo and would like to know if Pedro Pablo Rhoades can send something in for him            Can the clinic reply by [facility]? Yes       Would the patient rather a call back or a response via [facility]? Call back       Best call back number:[phone]

## 2024-01-19 NOTE — TELEPHONE ENCOUNTER
No care due was identified.  [facility] Embedded Care Due Messages. Reference number: [id number].   1/19/2024 4:13:10 PM CST

## 2024-01-19 NOTE — TELEPHONE ENCOUNTER
----- Message from Diamone Speed sent at 1/19/2024 12:00 PM CST -----  Regarding: self  Type: Patient Call Back       Who called: self        What is the request in detail: pt stated that he have vertigo and would like to know if Pedro Pablo Rhoades can send something in for him            Can the clinic reply by [facility]? Yes       Would the patient rather a call back or a response via [facility]? Call back       Best call back number:[phone]

## 2024-01-22 DIAGNOSIS — R94.31 ABNORMAL ELECTROCARDIOGRAM (ECG) (EKG): ICD-10-CM

## 2024-01-22 DIAGNOSIS — I10 ESSENTIAL (PRIMARY) HYPERTENSION: ICD-10-CM

## 2024-01-22 RX ORDER — LISINOPRIL 40 MG/1
40 TABLET ORAL DAILY
Qty: 90 TABLET | Refills: 3 | Status: SHIPPED | OUTPATIENT
Start: 2024-01-22

## 2024-02-14 ENCOUNTER — OFFICE VISIT (OUTPATIENT)
Dept: CARDIOLOGY | Facility: CLINIC | Age: 74
End: 2024-02-14
Payer: MEDICARE

## 2024-02-14 VITALS
SYSTOLIC BLOOD PRESSURE: 124 MMHG | HEIGHT: 71 IN | BODY MASS INDEX: 31.08 KG/M2 | WEIGHT: 222 LBS | OXYGEN SATURATION: 98 % | DIASTOLIC BLOOD PRESSURE: 70 MMHG | HEART RATE: 76 BPM

## 2024-02-14 DIAGNOSIS — I25.10 CORONARY ARTERY DISEASE INVOLVING NATIVE CORONARY ARTERY OF NATIVE HEART WITHOUT ANGINA PECTORIS: ICD-10-CM

## 2024-02-14 DIAGNOSIS — I10 ESSENTIAL HYPERTENSION: Primary | ICD-10-CM

## 2024-02-14 DIAGNOSIS — E78.5 HYPERLIPIDEMIA, UNSPECIFIED HYPERLIPIDEMIA TYPE: ICD-10-CM

## 2024-02-14 DIAGNOSIS — I45.2 RBBB (RIGHT BUNDLE BRANCH BLOCK WITH LEFT ANTERIOR FASCICULAR BLOCK): ICD-10-CM

## 2024-02-14 DIAGNOSIS — R94.31 ABNORMAL ECG: ICD-10-CM

## 2024-02-14 DIAGNOSIS — Z95.5 STATUS POST CORONARY ARTERY STENT PLACEMENT: ICD-10-CM

## 2024-02-14 PROCEDURE — 3078F DIAST BP <80 MM HG: CPT | Mod: HCNC,CPTII,S$GLB, | Performed by: INTERNAL MEDICINE

## 2024-02-14 PROCEDURE — 4010F ACE/ARB THERAPY RXD/TAKEN: CPT | Mod: HCNC,CPTII,S$GLB, | Performed by: INTERNAL MEDICINE

## 2024-02-14 PROCEDURE — 99999 PR PBB SHADOW E&M-EST. PATIENT-LVL IV: CPT | Mod: PBBFAC,HCNC,, | Performed by: INTERNAL MEDICINE

## 2024-02-14 PROCEDURE — 3008F BODY MASS INDEX DOCD: CPT | Mod: HCNC,CPTII,S$GLB, | Performed by: INTERNAL MEDICINE

## 2024-02-14 PROCEDURE — 3074F SYST BP LT 130 MM HG: CPT | Mod: HCNC,CPTII,S$GLB, | Performed by: INTERNAL MEDICINE

## 2024-02-14 PROCEDURE — 1160F RVW MEDS BY RX/DR IN RCRD: CPT | Mod: HCNC,CPTII,S$GLB, | Performed by: INTERNAL MEDICINE

## 2024-02-14 PROCEDURE — 1159F MED LIST DOCD IN RCRD: CPT | Mod: HCNC,CPTII,S$GLB, | Performed by: INTERNAL MEDICINE

## 2024-02-14 PROCEDURE — 99214 OFFICE O/P EST MOD 30 MIN: CPT | Mod: HCNC,S$GLB,, | Performed by: INTERNAL MEDICINE

## 2024-02-14 PROCEDURE — 1126F AMNT PAIN NOTED NONE PRSNT: CPT | Mod: HCNC,CPTII,S$GLB, | Performed by: INTERNAL MEDICINE

## 2024-02-14 RX ORDER — ATORVASTATIN CALCIUM 10 MG/1
10 TABLET, FILM COATED ORAL DAILY
Qty: 90 TABLET | Refills: 3 | Status: SHIPPED | OUTPATIENT
Start: 2024-02-14 | End: 2025-02-13

## 2024-02-16 ENCOUNTER — TELEPHONE (OUTPATIENT)
Dept: FAMILY MEDICINE | Facility: CLINIC | Age: 74
End: 2024-02-16
Payer: MEDICARE

## 2024-02-16 NOTE — TELEPHONE ENCOUNTER
----- Message from Bertha Shah sent at 2/16/2024  3:33 PM CST -----  Contact: pt  Pt is calling in regard to the letter he received to johanna a f/u and he would like to see if needs to come in.  Please call him back at  [phone] thanks/mpd

## 2024-02-21 ENCOUNTER — TELEPHONE (OUTPATIENT)
Dept: CARDIOLOGY | Facility: CLINIC | Age: 74
End: 2024-02-21
Payer: MEDICARE

## 2024-02-21 NOTE — TELEPHONE ENCOUNTER
Attempted without success to contact pt to discuss message. Left voicemail for pt to call back.     ----- Message from Loy Brewster Jr., MD sent at 2/21/2024  1:49 PM CST -----  Contact: self [phone]  For a couple of days very little risk  ----- Message -----  From: Nessa Contreras LPN  Sent: 2/21/2024   1:08 PM CST  To: Loy Brewster Jr., MD    Please advise.  ----- Message -----  From: Acacia Velez  Sent: 2/21/2024   1:07 PM CST  To: Narcisa MONTANA Jr Staff    Patient called in this afternoon wanting to know is it safe for him to take ibuprofen for pain. Please call back [phone]. Thanks tpw

## 2024-02-28 ENCOUNTER — E-VISIT (OUTPATIENT)
Dept: FAMILY MEDICINE | Facility: CLINIC | Age: 74
End: 2024-02-28
Payer: MEDICARE

## 2024-02-28 ENCOUNTER — TELEPHONE (OUTPATIENT)
Dept: FAMILY MEDICINE | Facility: CLINIC | Age: 74
End: 2024-02-28
Payer: MEDICARE

## 2024-02-28 ENCOUNTER — PATIENT MESSAGE (OUTPATIENT)
Dept: FAMILY MEDICINE | Facility: CLINIC | Age: 74
End: 2024-02-28

## 2024-02-28 DIAGNOSIS — M79.605 PAIN IN BOTH LOWER EXTREMITIES: Primary | ICD-10-CM

## 2024-02-28 DIAGNOSIS — M79.604 PAIN IN BOTH LOWER EXTREMITIES: Primary | ICD-10-CM

## 2024-02-28 DIAGNOSIS — M25.562 ACUTE PAIN OF LEFT KNEE: Primary | ICD-10-CM

## 2024-02-28 PROCEDURE — 99421 OL DIG E/M SVC 5-10 MIN: CPT | Mod: ,,, | Performed by: FAMILY MEDICINE

## 2024-02-28 RX ORDER — FAMOTIDINE 40 MG/1
40 TABLET, FILM COATED ORAL DAILY
Qty: 30 TABLET | Refills: 0 | Status: SHIPPED | OUTPATIENT
Start: 2024-02-28 | End: 2025-02-27

## 2024-02-28 RX ORDER — DICLOFENAC SODIUM 75 MG/1
75 TABLET, DELAYED RELEASE ORAL 2 TIMES DAILY
Qty: 28 TABLET | Refills: 0 | Status: SHIPPED | OUTPATIENT
Start: 2024-02-28

## 2024-02-28 RX ORDER — METHYLPREDNISOLONE 4 MG/1
TABLET ORAL
Qty: 21 TABLET | Refills: 0 | Status: SHIPPED | OUTPATIENT
Start: 2024-02-28

## 2024-02-28 NOTE — TELEPHONE ENCOUNTER
----- Message from Erica Miller sent at 2/28/2024  1:16 PM CST -----  Contact: Andrés Bowen is calling to speak to the nurse regarding wanting to receive a dose pack for him actually having knee pain he think its caused by the blood thinners, he want to use the [facility] pharmacy, if any questions please give him a call at [phone]    Thanks  LJ

## 2024-02-28 NOTE — PROGRESS NOTES
Patient ID: Andrés Casillas is a 73 y.o. male.    Chief Complaint: Knee Pain    The patient initiated a request through [employer] on 2/28/2024 for evaluation and management with a chief complaint of Knee Pain     I evaluated the questionnaire submission on 02/28/2024  .    [facility]    2/28/2024  3:31 PM CST - Filed by Patient   Do you agree to participate in an E-Visit? Yes   If you have any of the following symptoms, please present to your local ER or call 911:  I acknowledge   Choose the state of your primary residence [place]   What is the main issue that you would like for your doctor to address today? Knee pain. Inflamed!   Are you able to take your vital signs? No   Please describe your symptoms Pain in inflamed knee   Where is your problem located? Knee on left side.   How severe are your symptoms? Severe   Have you had these symptoms before? No   How long have you been having these symptoms? For one to four weeks   Please list any medications or treatments you have used for your condition and indicate if it was effective or not. None   What makes this feel better? Nothing   What makes this feel worse? Walking   Are these symptoms related to a condition that you currently have? No   Please describe any probable cause for these symptoms Not sure, probably blood thinners   Provide any information you feel is important to your history not asked above    Please attach any relevant images or files           Active Problem List with Overview Notes    Diagnosis Date Noted    Coronary artery disease involving native coronary artery of native heart without angina pectoris 02/14/2024    Status post coronary artery stent placement 01/05/2024     Patient with recent stent placed.  Cardiac catheterization    The Prox RCA lesion was 90% stenosed with 0% stenosis   post-intervention.    The ejection fraction was calculated to be 60%.    The pre-procedure left ventricular end diastolic pressure was 23.    The  post-procedure left ventricular end diastolic pressure was 25.    Prox RCA lesion: A .    Prox RCA lesion: A .    Prox RCA lesion: A stent was successfully placed at 13 NAYELI for 20 sec.    The estimated blood loss was none.    The PCI was successful.    The procedure log was documented by Documenter: Nicola Lizarraga RN and   verified by Dylan Johnson MD.    Date: 12/18/2023  Time: 6:35 PM          Plantar fasciitis 01/05/2024     New problem.  Patient having issue with heel pain.  He is unable to exercise because of this condition.      Obesity (BMI 30.0-34.9) 12/12/2023    Restrictive lung disease secondary to obesity 12/12/2023    RBBB (right bundle branch block with left anterior fascicular block) 11/07/2023    DDD (degenerative disc disease), lumbar 08/22/2023     REASON FOR EXAM: [R97.20]-Elevated prostate specific antigen (PSA) / [Z12.5]-Encounter for screening for malignant neoplasm of prostate / Enlarged prostate     TECHNICAL FACTORS: Multiplanar multisequence MRI of the pelvis with attention to the prostate precontrast.  Dynamic postcontrast axial T1 images were obtained and [employer] software was used for post processing and analysis. ProFuse image mapping was not   performed.     DOSE: 20  mL ProHance IV     ADDITIONAL CLINICAL INFORMATION:     PSA:   3.0 ng/mL June 2023, 2.2 ng/mL October 2022, 3.4 ng/mL May 2022       Prostate biopsy:   None available     COMPARISON: None     FINDINGS:   Prostate size: 43  cc   Intravesical prostate protrusion: None   Post biopsy or other hemorrhage: None       PERIPHERAL ZONE:   Index lesion(s) location and size: No index lesion     T2W appearance: Heterogeneous T2 signal in the peripheral zone, particularly at the mid gland level.     DWI appearance: No abnormal restricted diffusion     Dynamic Contrast: No early or pathological enhancement     Composite PI-RADS: 2       TRANSITION ZONE:   Index lesion(s) location and size: No index lesion     T2W appearance: Mild  T2 heterogeneity and scattered BPH nodules   DWI appearance: No abnormal restricted diffusion   Dynamic Contrast: No early or pathological enhancement   Composite PI-RADS: 2     ADDITIONAL FINDINGS:   Prostate: None   Seminal vesicles: None   Lymphadenopathy: No significant lymphadenopathy   Osseous: Degenerative changes of the lower lumbar spine and sacroiliac joints. Nonspecific T1 low signal 2.5 cm lesion in the right femoral head. This could indicate a sclerotic bone island.   Abdomen/Pelvis: Fat-containing bilateral inguinal hernias. Fairly extensive colonic diverticulosis.     IMPRESSION:    1.  PIRADS 2 - Low (clinically significant cancer is unlikely to be present).    2.  Changes of mild BPH.   3. Fairly extensive distal colonic diverticulosis.       PIRADS ASSESSMENT CATEGORIES:   PIRADS 1-Very Low (clinically significant cancer is highly unlikely)   PIRADS 2-Low (clinically significant cancer is unlikely)   PIRADS 3-Intermediate (the presence of clinically significant cancer is equivocal)   PIRADS 4-High (clinically significant cancer is likely)   PIRADS 5-Very High (clinically significant cancer is highly likely)     Electronically signed by Piotr Mann MD on 8/1/2023 1:49 PM        Bilateral inguinal hernia without obstruction or gangrene 08/22/2023     Seen on recent imaging of MRI.  Patient not having any issues with this condition currently.      Bone lesion 08/22/2023     REASON FOR EXAM: [R97.20]-Elevated prostate specific antigen (PSA) / [Z12.5]-Encounter for screening for malignant neoplasm of prostate / Enlarged prostate     TECHNICAL FACTORS: Multiplanar multisequence MRI of the pelvis with attention to the prostate precontrast.  Dynamic postcontrast axial T1 images were obtained and [employer] software was used for post processing and analysis. ProFuse image mapping was not   performed.     DOSE: 20  mL ProHance IV     ADDITIONAL CLINICAL INFORMATION:     PSA:   3.0 ng/mL June 2023, 2.2 ng/mL  October 2022, 3.4 ng/mL May 2022       Prostate biopsy:   None available     COMPARISON: None     FINDINGS:   Prostate size: 43  cc   Intravesical prostate protrusion: None   Post biopsy or other hemorrhage: None       PERIPHERAL ZONE:   Index lesion(s) location and size: No index lesion     T2W appearance: Heterogeneous T2 signal in the peripheral zone, particularly at the mid gland level.     DWI appearance: No abnormal restricted diffusion     Dynamic Contrast: No early or pathological enhancement     Composite PI-RADS: 2       TRANSITION ZONE:   Index lesion(s) location and size: No index lesion     T2W appearance: Mild T2 heterogeneity and scattered BPH nodules   DWI appearance: No abnormal restricted diffusion   Dynamic Contrast: No early or pathological enhancement   Composite PI-RADS: 2     ADDITIONAL FINDINGS:   Prostate: None   Seminal vesicles: None   Lymphadenopathy: No significant lymphadenopathy   Osseous: Degenerative changes of the lower lumbar spine and sacroiliac joints. Nonspecific T1 low signal 2.5 cm lesion in the right femoral head. This could indicate a sclerotic bone island.   Abdomen/Pelvis: Fat-containing bilateral inguinal hernias. Fairly extensive colonic diverticulosis.     IMPRESSION:    1.  PIRADS 2 - Low (clinically significant cancer is unlikely to be present).    2.  Changes of mild BPH.   3. Fairly extensive distal colonic diverticulosis.       PIRADS ASSESSMENT CATEGORIES:   PIRADS 1-Very Low (clinically significant cancer is highly unlikely)   PIRADS 2-Low (clinically significant cancer is unlikely)   PIRADS 3-Intermediate (the presence of clinically significant cancer is equivocal)   PIRADS 4-High (clinically significant cancer is likely)   PIRADS 5-Very High (clinically significant cancer is highly likely)     Electronically signed by Piotr Mann MD on 8/1/2023 1:49 PM        Bone island of right femur 08/22/2023    Decreased energy 06/19/2023    Cervicalgia 04/03/2023      "Chronic problem. Reports onset "years ago". Intermittently flares. Worse to left side and radiates down left shoulder blade. Associated with occasional numbness down LUE. There has been no recent injuries or known trauma. He has had recurrent self limited episodes of neck pain in the past.  Reports no previous evaluation. He states he has tried physical therapy in the past. He has seen a chiropractor. He has tried OTC NSAIDs with no improvement.       Constipation 04/03/2023     New problem. Worsening over the last few weeks. Bowel movements occur nearly every day. However, BM is difficult. Current Health Habits: Eating fiber? Yes. He is taking OTC fiber supplement. Exercise? Occasional. Water intake? Minimal. Patient admits to not drinking enough water. Last colonoscopy 01/2020, poor prep. Discussed. Patient plans to follow up with Dr. Richards.       Elevated prostate specific antigen (PSA) 10/10/2022    Low testosterone 10/10/2022    Need for hepatitis C screening test 05/27/2022     Patient is due for hepatitis C screening.   Patient advised of risk of soraya hepatitis C including being Born between 1943 and 1965, blood transfusions prior to 1992, IV or nasal drug use, tattoos, sexual intercourse.  Patient would like to be tested.  Patient reports possible exposure to hepatitis-C through sexual transmission.    Lab Results   Component Value Date    HEPAIGM Negative 05/21/2014    HEPBIGM Negative 05/21/2014    HEPBCAB Negative 01/23/2009    HEPCAB Negative 05/21/2014         Vitamin D deficiency 10/22/2021     Chronic. Vit d deficiency. Takes vitamin d supplement. No SE reported. Fatigue is slightly improved.   Lab Results   Component Value Date    OYOYVLSP26JR 84 10/16/2020            Vitamin B12 deficiency 10/22/2021     Chronic.  Stable.  Lab Results   Component Value Date    EUCCYJNE45 1081 (H) 10/16/2020         Ganglion cyst of wrist, right 10/16/2020     Chronic.  Recurrent.  Patient reports that the " cyst on his right wrist was swollen few weeks ago and was painful.  Currently it has gone back down and is not bothering him but is still present.  Patient does not want anything done with it today but just wanted noted.  Just in case he needs a referral.      Palpitation 10/16/2020     Subacute.  Patient states that he has been feeling and abnormal twitching sensation under his left breast/chest.  Patient denies any chest pain but does have an abnormal sensation.  Patient is concerned that it may be a hiatal hernia?  He has had reflux in the past but reports this sensation is different.  Patient does exercise regularly and lifts weights.  Patient reports that sometimes it is tender to palpation.    Results for orders placed or performed in visit on 10/16/20   IN OFFICE EKG 12-LEAD (to [facility])    Collection Time: 10/16/20 10:44 AM    Narrative    Test Reason :     Vent. Rate : 066 BPM     Atrial Rate : 066 BPM     P-R Int : 172 ms          QRS Dur : 146 ms      QT Int : 380 ms       P-R-T Axes : 038 -57 030 degrees     QTc Int : 398 ms    Normal sinus rhythm  Right bundle branch block  Left anterior fascicular block   Bifascicular block   Moderate voltage criteria for LVH, may be normal variant  Cannot rule out Septal infarct ,age undetermined  Abnormal ECG  When compared with ECG of 01-DEC-2011 12:57,  Previous ECG has undetermined rhythm, needs review  (RBBB and left anterior fascicular block) is now Present  Minimal criteria for Septal infarct are now Present    Referred By: DIOGENES WALDRON           Confirmed By:            Epigastric pain 10/16/2020     See palpitation problem.      Abnormal ECG 10/16/2020    Benzodiazepine dependence, continuous 07/15/2020     Chronic.  Stable.   reviewed.  On long-term Xanax as needed for panic attacks and anxiety      Anxiety 07/13/2020 January 2024:  Patient reports compliance with medications.  No side effects reported.  Symptoms are controlled.  Patient has increased  "anxiety about his health.  Recently had stent placed in his heart.    Chronic.  August 2023: Patient here for medication refill.  Reports compliance.  No side effects reported.  Symptoms are controlled.  Stable.  Patient on chronic benzodiazepine from previous PCP.  Transitioning care to me.The patient was checked in the [facility] Board of Pharmacy's  Prescription Monitoring Program. There appears to be no incongruities with the patient's verbalized history.   No abuse suspected.  February 2021:  Patient had PVCs on cardiac workup.  Patient reports cardiology has adjusted medications that he is doing much better.  Blood pressure has been well controlled.  May 2022:  Patient is here for medication refills.  He continues to have symptoms that are intermittently controlled with medication.      Hypotestosteronemia 07/13/2020     Chronic.  Patient reports that this condition is stable.  Managed by Urology.  Patient is using injections currently.    Lab Results   Component Value Date    WBC 4.81 10/16/2020    HGB 17.4 10/16/2020    HCT 53.9 10/16/2020    MCV 96 10/16/2020     10/16/2020       Reviewed previous Urology record:He has previously been seen by Dr. Luna.   He has been on testosterone replacement.  [facility].  He is on testosterone injection as well as Hcg Injections.  He injects 2 times per week.    He has no bothersome voiding complaints.  He has urinary frequency and takes Flomax but not always consistently.  His flow is ok.  He has low back pain "prostatitis" when he drinks too much caffeine.   Brother had prostate cancer.  Previous PSA have been in normal range.  Urine dipstick shows negative for all components.      Encounter for long-term (current) use of medications 07/13/2020 January 2024: Reviewed labs.  August 2023: Reviewed labs.  Initial HPI:  CHRONIC. Stable. Compliant with medications for managed conditions. See medication list. No SE reported. Routine lab analysis is being " monitored. Refills were addressed.February 2021:CHRONIC. Stable. Compliant with medications for managed conditions. See medication list. No SE reported. Routine lab analysis is being monitored. Refills were addressed.  October 2021:  Reviewed labs.  May 2022:  Reviewed labs.  Lab Results   Component Value Date    WBC 5.54 12/15/2023    HGB 15.3 12/15/2023    HCT 44.1 12/15/2023    MCV 94 12/15/2023     12/15/2023       Chemistry        Component Value Date/Time     12/15/2023 1503    K 4.7 12/18/2023 1210     12/15/2023 1503    CO2 24 12/15/2023 1503    BUN 36 (H) 12/15/2023 1503    CREATININE 1.0 12/15/2023 1503     12/15/2023 1503        Component Value Date/Time    CALCIUM 9.7 12/15/2023 1503    ALKPHOS 103 05/27/2022 1129    AST 24 05/27/2022 1129    ALT 32 05/27/2022 1129    BILITOT 0.5 05/27/2022 1129    ESTGFRAFRICA >60.0 05/27/2022 1129    EGFRNONAA >60.0 05/27/2022 1129        Lab Results   Component Value Date    TSH 1.746 10/30/2023    P2IJJWU 6.5 06/30/2011       Osteoarthritis of right knee 06/19/2019    Essential hypertension      CHRONIC. STABLE. BP Reviewed.  Compliant with BP medications. No SE reported.  May 2022:  Blood pressure well controlled on current regimen including amlodipine.  July 2020:  Compliant with lisinopril 20 mg daily.  No cough.  (-) CP, SOB, palpitations, dizziness, lightheadedness, HA, arm numbness, tingling or weakness, syncope.  Creatinine   Date Value Ref Range Status   07/14/2020 1.0 0.5 - 1.4 mg/dL Final         Benign prostatic hyperplasia with lower urinary tract symptoms      Chronic.  Intermittent control.  Patient following with urology.      REASON FOR EXAM: [R97.20]-Elevated prostate specific antigen (PSA) / [Z12.5]-Encounter for screening for malignant neoplasm of prostate / Enlarged prostate     TECHNICAL FACTORS: Multiplanar multisequence MRI of the pelvis with attention to the prostate precontrast.  Dynamic postcontrast axial T1 images  were obtained and [employer] software was used for post processing and analysis. ProFuse image mapping was not   performed.     DOSE: 20  mL ProHance IV     ADDITIONAL CLINICAL INFORMATION:     PSA:   3.0 ng/mL June 2023, 2.2 ng/mL October 2022, 3.4 ng/mL May 2022       Prostate biopsy:   None available     COMPARISON: None     FINDINGS:   Prostate size: 43  cc   Intravesical prostate protrusion: None   Post biopsy or other hemorrhage: None       PERIPHERAL ZONE:   Index lesion(s) location and size: No index lesion     T2W appearance: Heterogeneous T2 signal in the peripheral zone, particularly at the mid gland level.     DWI appearance: No abnormal restricted diffusion     Dynamic Contrast: No early or pathological enhancement     Composite PI-RADS: 2       TRANSITION ZONE:   Index lesion(s) location and size: No index lesion     T2W appearance: Mild T2 heterogeneity and scattered BPH nodules   DWI appearance: No abnormal restricted diffusion   Dynamic Contrast: No early or pathological enhancement   Composite PI-RADS: 2     ADDITIONAL FINDINGS:   Prostate: None   Seminal vesicles: None   Lymphadenopathy: No significant lymphadenopathy   Osseous: Degenerative changes of the lower lumbar spine and sacroiliac joints. Nonspecific T1 low signal 2.5 cm lesion in the right femoral head. This could indicate a sclerotic bone island.   Abdomen/Pelvis: Fat-containing bilateral inguinal hernias. Fairly extensive colonic diverticulosis.     IMPRESSION:    1.  PIRADS 2 - Low (clinically significant cancer is unlikely to be present).    2.  Changes of mild BPH.   3. Fairly extensive distal colonic diverticulosis.       PIRADS ASSESSMENT CATEGORIES:   PIRADS 1-Very Low (clinically significant cancer is highly unlikely)   PIRADS 2-Low (clinically significant cancer is unlikely)   PIRADS 3-Intermediate (the presence of clinically significant cancer is equivocal)   PIRADS 4-High (clinically significant cancer is likely)   PIRADS  5-Very High (clinically significant cancer is highly likely)     Electronically signed by Piotr Mann MD on 8/1/2023 1:49 PM        GAURANG (obstructive sleep apnea)      8/15/2018: AHI 17.2 , min sat 89%        Recent Labs Obtained:  No visits with results within 7 Day(s) from this visit.   Latest known visit with results is:   Lab Visit on 01/16/2024   Component Date Value Ref Range Status    WBC 01/16/2024 4.82  3.90 - 12.70 K/uL Final    RBC 01/16/2024 4.24 (L)  4.60 - 6.20 M/uL Final    Hemoglobin 01/16/2024 13.2 (L)  14.0 - 18.0 g/dL Final    Hematocrit 01/16/2024 39.8 (L)  40.0 - 54.0 % Final    MCV 01/16/2024 94  82 - 98 fL Final    MCH 01/16/2024 31.1 (H)  27.0 - 31.0 pg Final    MCHC 01/16/2024 33.2  32.0 - 36.0 g/dL Final    RDW 01/16/2024 13.2  11.5 - 14.5 % Final    Platelets 01/16/2024 216  150 - 450 K/uL Final    MPV 01/16/2024 11.1  9.2 - 12.9 fL Final    Immature Granulocytes 01/16/2024 0.2  0.0 - 0.5 % Final    Gran # (ANC) 01/16/2024 2.3  1.8 - 7.7 K/uL Final    Immature Grans (Abs) 01/16/2024 0.01  0.00 - 0.04 K/uL Final    Comment: Mild elevation in immature granulocytes is non specific and   can be seen in a variety of conditions including stress response,   acute inflammation, trauma and pregnancy. Correlation with other   laboratory and clinical findings is essential.      Lymph # 01/16/2024 1.7  1.0 - 4.8 K/uL Final    Mono # 01/16/2024 0.6  0.3 - 1.0 K/uL Final    Eos # 01/16/2024 0.2  0.0 - 0.5 K/uL Final    Baso # 01/16/2024 0.04  0.00 - 0.20 K/uL Final    nRBC 01/16/2024 0  0 /100 WBC Final    Gran % 01/16/2024 47.1  38.0 - 73.0 % Final    Lymph % 01/16/2024 35.1  18.0 - 48.0 % Final    Mono % 01/16/2024 12.2  4.0 - 15.0 % Final    Eosinophil % 01/16/2024 4.6  0.0 - 8.0 % Final    Basophil % 01/16/2024 0.8  0.0 - 1.9 % Final    Differential Method 01/16/2024 Automated   Final       Encounter Diagnosis   Name Primary?    Acute pain of left knee Yes        Orders Placed This Encounter    Procedures    X-ray Knee Ortho Left     Standing Status:   Future     Standing Expiration Date:   2025     Order Specific Question:   May the Radiologist modify the order per protocol to meet the clinical needs of the patient?     Answer:   Yes     Order Specific Question:   Release to patient     Answer:   Immediate      Medications Ordered This Encounter   Medications    diclofenac (VOLTAREN) 75 MG EC tablet     Sig: Take 1 tablet (75 mg total) by mouth 2 (two) times daily.     Dispense:  28 tablet     Refill:  0        E-Visit Time Trackin m

## 2024-02-28 NOTE — TELEPHONE ENCOUNTER
----- Message from Julia Farr sent at 2/28/2024  2:20 PM CST -----  Regarding: pt call back  Name of Who is Calling:Pt         What is the request in detail: Requesting callback if possible in regards to E-VISIT           Can the clinic reply by [facility]: yes         What Number to [facility] Back [facility]:Telephone Information:  Mobile          [phone]

## 2024-03-15 ENCOUNTER — TELEPHONE (OUTPATIENT)
Dept: CARDIOLOGY | Facility: CLINIC | Age: 74
End: 2024-03-15
Payer: MEDICARE

## 2024-03-15 NOTE — TELEPHONE ENCOUNTER
Dr. Moise is requesting cardiac clearance for patient to have a prostate biopsy under mac anesthesia. Please include instructions on holding Plavix and aspirin. Procedure scheduled for 04/03/2024

## 2024-04-19 RX ORDER — ALBUTEROL SULFATE 90 UG/1
2 AEROSOL, METERED RESPIRATORY (INHALATION) EVERY 6 HOURS PRN
Qty: 54 G | Refills: 2 | Status: SHIPPED | OUTPATIENT
Start: 2024-04-19

## 2024-04-19 NOTE — TELEPHONE ENCOUNTER
Refill Decision Note   Andrés Casillas  is requesting a refill authorization.  Brief Assessment and Rationale for Refill:  Approve     Medication Therapy Plan:         Alert overridden per protocol: Yes   Comments:     Note composed:12:21 PM 04/19/2024

## 2024-05-18 ENCOUNTER — PATIENT MESSAGE (OUTPATIENT)
Dept: FAMILY MEDICINE | Facility: CLINIC | Age: 74
End: 2024-05-18
Payer: MEDICARE

## 2024-06-14 ENCOUNTER — TELEPHONE (OUTPATIENT)
Dept: FAMILY MEDICINE | Facility: CLINIC | Age: 74
End: 2024-06-14
Payer: MEDICARE

## 2024-06-14 NOTE — TELEPHONE ENCOUNTER
----- Message from May Akins sent at 6/14/2024  3:44 PM CDT -----  Regarding: work in appt  Name of who is calling:   Andrés      What is the request in detail: Pt is requesting a call back in ref to getting a sooner appt  due to needing blood work , knee pain and cholesterol      Can the clinic reply by [facility]:yes      What number to call back if not [facility]: [phone]

## 2024-06-21 ENCOUNTER — OFFICE VISIT (OUTPATIENT)
Dept: FAMILY MEDICINE | Facility: CLINIC | Age: 74
End: 2024-06-21
Payer: MEDICARE

## 2024-06-21 VITALS
OXYGEN SATURATION: 97 % | BODY MASS INDEX: 30.68 KG/M2 | WEIGHT: 219.13 LBS | HEIGHT: 71 IN | HEART RATE: 87 BPM | SYSTOLIC BLOOD PRESSURE: 124 MMHG | DIASTOLIC BLOOD PRESSURE: 82 MMHG

## 2024-06-21 DIAGNOSIS — E34.9 HYPOTESTOSTERONEMIA: Primary | ICD-10-CM

## 2024-06-21 DIAGNOSIS — Z79.899 ENCOUNTER FOR LONG-TERM (CURRENT) USE OF MEDICATIONS: ICD-10-CM

## 2024-06-21 DIAGNOSIS — Z13.220 LIPID SCREENING: ICD-10-CM

## 2024-06-21 DIAGNOSIS — Z12.5 ENCOUNTER FOR PROSTATE CANCER SCREENING: ICD-10-CM

## 2024-06-21 DIAGNOSIS — Z79.899 ENCOUNTER FOR LONG-TERM (CURRENT) USE OF OTHER MEDICATIONS: ICD-10-CM

## 2024-06-21 DIAGNOSIS — F13.20 BENZODIAZEPINE DEPENDENCE, CONTINUOUS: ICD-10-CM

## 2024-06-21 DIAGNOSIS — F41.9 ANXIETY: Chronic | ICD-10-CM

## 2024-06-21 PROCEDURE — 99999 PR PBB SHADOW E&M-EST. PATIENT-LVL V: CPT | Mod: PBBFAC,HCNC,, | Performed by: FAMILY MEDICINE

## 2024-06-21 RX ORDER — ALPRAZOLAM 1 MG/1
1 TABLET ORAL 3 TIMES DAILY
Qty: 90 TABLET | Refills: 5 | Status: SHIPPED | OUTPATIENT
Start: 2024-06-21

## 2024-06-21 NOTE — PATIENT INSTRUCTIONS
Follow up in about 6 months (around 12/21/2024), or if symptoms worsen or fail to improve, for Med refills, LAB RESULTS.     Dear patient,   As a result of recent federal legislation (The Federal Cures Act), you may receive lab or pathology results from your visit in your [facility] account before your physician is able to contact you. Your physician or their representative will relay the results to you with their recommendations at their soonest availability.     If no improvement in symptoms or symptoms worsen, please be advised to call MD, follow-up at clinic and/or go to ER if becomes severe.    Pedro Pablo Dc M.D.        We Offer TELEHEALTH & Same Day Appointments!   Book your Telehealth appointment with me through my nurse or   Clinic appointments on [employer]!    [id number] [place] [address]    Office: [phone]   FAX: [phone]    Check out my Facebook Page and Follow Me at: [email or web address]    Check out my website at [employer] by clicking on: [email or web address]    To Schedule appointments online, go to [employer]: [email or web address]

## 2024-06-21 NOTE — ASSESSMENT & PLAN NOTE
Continue follow-up with Urology.  Check testosterone level.  Check CBC.  Check PSA.  The natural history of prostate cancer and ongoing controversy regarding screening and potential treatment outcomes of prostate cancer has been discussed with the patient. The meaning of a false positive PSA and a false negative PSA has been discussed. He indicates understanding of the limitations of this screening test and wishes  to proceed with screening PSA testing.

## 2024-06-21 NOTE — PROGRESS NOTES
PLAN:    Assessment & Plan  1. Routine follow-up.  The patient is advised to commence a regimen of Plavix, administered every other day. A six-month refill of Xanax has been provided. Laboratory tests have been ordered, to be conducted on Monday.    Problem List Items Addressed This Visit       Anxiety (Chronic)     Chronic. Stable. Taking Xanax as prescribed. Tolerating medication well with no SE reported. Requesting refills.  reviewed. Refill Xanax for six months.  Follow-up in six months.  Discussed condition course and signs and symptoms to expect.  Patient advised of risk and benefits of medication use.  ER precautions.  Call MD or follow-up to clinic if not improving or worsening symptoms.    -discussed anxiety condition course  -discussed SSRI/SNRI as first-line treatment for this condition  -discussed risk of discontinuing this medication without tapering  -patient was educated, advised of side effects, and all questions were answered.  Patient voiced understanding  -patient will follow up routinely and notify us if having any side effects or worsening or persistent symptoms. Denies SI/HI. ER precautions were given.         Relevant Medications    ALPRAZolam (XANAX) 1 MG tablet    Hypotestosteronemia - Primary (Chronic)     Continue follow-up with Urology.  Check testosterone level.  Check CBC.  Check PSA.  The natural history of prostate cancer and ongoing controversy regarding screening and potential treatment outcomes of prostate cancer has been discussed with the patient. The meaning of a false positive PSA and a false negative PSA has been discussed. He indicates understanding of the limitations of this screening test and wishes  to proceed with screening PSA testing.           Relevant Orders    Testosterone    Benzodiazepine dependence, continuous (Chronic)     Stable condition.  Refilling medication.         Encounter for long-term (current) use of other medications     Complete history and  physical was completed today.  Complete and thorough medication reconciliation was performed.  Discussed risks and benefits of medications.  Advised patient on orders and health maintenance.  We discussed old records and old labs if available.  Will request any records not available through epic.  Continue current medications listed on your summary sheet.           Relevant Medications    ALPRAZolam (XANAX) 1 MG tablet    Other Relevant Orders    CBC Without Differential    Comprehensive Metabolic Panel    TSH    Hemoglobin A1C    Lipid Panel     Other Visit Diagnoses       Lipid screening        Relevant Orders    Lipid Panel    Encounter for prostate cancer screening        Relevant Orders    PSA, SCREENING        The natural history of prostate cancer and ongoing controversy regarding screening and potential treatment outcomes of prostate cancer has been discussed with the patient. The meaning of a false positive PSA and a false negative PSA has been discussed. He indicates understanding of the limitations of this screening test and wishes  to proceed with screening PSA testing.  Future Appointments       Date Provider Specialty Appt Notes    6/24/2024  Lab     8/14/2024  Lab [facility]    8/19/2024 Loy Brewster Jr., MD Cardiology 6 month f/u           Medication Management for assessment above:   Medication List with Changes/Refills   Current Medications    ALBUTEROL (PROVENTIL/VENTOLIN HFA) 90 MCG/ACTUATION INHALER    Inhale 2 puffs into the lungs every 6 (six) hours as needed for Wheezing.    ALBUTEROL (PROVENTIL/VENTOLIN HFA) 90 MCG/ACTUATION INHALER    INHALE 2 PUFFS BY MOUTH EVERY 6 HOURS AS NEEDED FOR WHEEZE    ALFUZOSIN (UROXATRAL) 10 MG TB24        ASPIRIN 81 MG CHEW    Take 81 mg by mouth once daily.    BUPROPION (WELLBUTRIN XL) 300 MG 24 HR TABLET    Take 1 tablet (300 mg total) by mouth once daily.    CHOLECALCIFEROL, VITAMIN D3, (VITAMIN D3) 25 MCG (1,000 UNIT) CAPSULE    Take 1,000 Units by  mouth.    CLOPIDOGREL (PLAVIX) 75 MG TABLET    Take 1 tablet (75 mg total) by mouth once daily.    CO-ENZYME Q-10 30 MG CAPSULE    Take 30 mg by mouth once daily.     CYANOCOBALAMIN (VITAMIN B-12) 100 MCG TABLET    Take 100 mcg by mouth.    DICLOFENAC (VOLTAREN) 75 MG EC TABLET    Take 1 tablet (75 mg total) by mouth 2 (two) times daily.    DIINDOLYLMETHANE, BULK, MISC    100 mg by Other route.    DOCUSATE SODIUM (COLACE) 100 MG CAPSULE    Take 1 capsule (100 mg total) by mouth 2 (two) times daily as needed for Constipation.    DORZOLAMIDE-TIMOLOL, PF, (COSOPT PF) 2-0.5 % DPET OPHTHALMIC SOLUTION        FAMOTIDINE (PEPCID) 40 MG TABLET    Take 1 tablet (40 mg total) by mouth once daily.    FISH OIL-OMEGA-3 FATTY ACIDS 300-1,000 MG CAPSULE    Take 1 capsule by mouth once daily.     LATANOPROST 0.005 % OPHTHALMIC SOLUTION    Place 1 drop into both eyes nightly.    LISINOPRIL (PRINIVIL,ZESTRIL) 40 MG TABLET    Take 1 tablet (40 mg total) by mouth once daily.    MAGNESIUM OXIDE (MAG-OX) 400 MG (241.3 MG MAGNESIUM) TABLET    Take 400 mg by mouth once daily.     MECLIZINE (ANTIVERT) 25 MG TABLET    Take 1 tablet (25 mg total) by mouth 3 (three) times daily as needed for Dizziness.    TERAZOSIN (HYTRIN) 5 MG CAPSULE    Take by mouth.    TESTOSTERONE CYPIONATE (DEPOTESTOTERONE CYPIONATE) 100 MG/ML INJECTION    Inject into the muscle.    ZINC GLUCONATE 50 MG TABLET    Take 50 mg by mouth.   Changed and/or Refilled Medications    Modified Medication Previous Medication    ALPRAZOLAM (XANAX) 1 MG TABLET ALPRAZolam (XANAX) 1 MG tablet       Take 1 tablet (1 mg total) by mouth 3 (three) times daily.    Take 1 tablet (1 mg total) by mouth 3 (three) times daily.   Discontinued Medications    ATORVASTATIN (LIPITOR) 10 MG TABLET    Take 1 tablet (10 mg total) by mouth once daily.    METHYLPREDNISOLONE (MEDROL DOSEPACK) 4 MG TABLET    follow package directions    MONTELUKAST (SINGULAIR) 10 MG TABLET    TAKE 1 TABLET BY MOUTH EVERY  DAY IN THE EVENING    TADALAFIL (CIALIS) 5 MG TABLET    Take 5 mg by mouth once daily.    TAMSULOSIN (FLOMAX) 0.4 MG CP24    0.4 mg.     TERBINAFINE HCL (LAMISIL) 250 MG TABLET    TAKE 1 TABLET BY MOUTH AS DIRECTED TAKE CONSECUTIVE 10 DAYS OF A MONTH X 3 MONTHS       Pedro Pablo Dc M.D.  ==========================================================================  Subjective:   Patient ID: Andrés Casillas is a 74 y.o. male.  has a past medical history of Anxiety, BPH (benign prostatic hypertrophy), DDD (degenerative disc disease), lumbar, Hypertension, and GAURANG (obstructive sleep apnea).   Chief Complaint: Fatigue    History of Present Illness  This patient is a pleasant 74-year-old male coming in for follow-up of chronic conditions.    The patient expresses a desire to discontinue his anticoagulant therapy. He reports a meniscal tear in his knee, which he attributes to his anticoagulant therapy. His physical activity has been limited due to his knee condition, which he suspects may be contributing to his overall well-being. He reports a lack of motivation to engage in activities. His last electrocardiogram (EKG) yielded abnormal results. He has been reducing his testosterone intake, which he believes may be contributing to his fatigue. His testosterone levels are currently in the range of 700 to 800. He is due for a refill of his Xanax prescription. His anticoagulant therapy was initiated by Dr. Brewster in 12/2023. He is not currently taking Lipitor for cholesterol management.    Problem List Items Addressed This Visit       Anxiety (Chronic)    Overview     June 2024:  Patient needing medication refills.  January 2024:  Patient reports compliance with medications.  No side effects reported.  Symptoms are controlled.  Patient has increased anxiety about his health.  Recently had stent placed in his heart.    Chronic.  August 2023: Patient here for medication refill.  Reports compliance.  No side effects  reported.  Symptoms are controlled.  Stable.  Patient on chronic benzodiazepine from previous PCP.  Transitioning care to me.The patient was checked in the [facility] [employer] [facility] [employer]'s  Prescription Monitoring Program. There appears to be no incongruities with the patient's verbalized history.   No abuse suspected.  February 2021:  Patient had PVCs on cardiac workup.  Patient reports cardiology has adjusted medications that he is doing much better.  Blood pressure has been well controlled.  May 2022:  Patient is here for medication refills.  He continues to have symptoms that are intermittently controlled with medication.         Current Assessment & Plan     Chronic. Stable. Taking Xanax as prescribed. Tolerating medication well with no SE reported. Requesting refills.  reviewed. Refill Xanax for six months.  Follow-up in six months.  Discussed condition course and signs and symptoms to expect.  Patient advised of risk and benefits of medication use.  ER precautions.  Call MD or follow-up to clinic if not improving or worsening symptoms.    -discussed anxiety condition course  -discussed SSRI/SNRI as first-line treatment for this condition  -discussed risk of discontinuing this medication without tapering  -patient was educated, advised of side effects, and all questions were answered.  Patient voiced understanding  -patient will follow up routinely and notify us if having any side effects or worsening or persistent symptoms. Denies SI/HI. ER precautions were given.         Hypotestosteronemia - Primary (Chronic)    Overview     Chronic.  Patient reports that this condition is stable.  Managed by Urology.  Patient is using injections currently.    Lab Results   Component Value Date    WBC 4.82 01/16/2024    HGB 13.2 (L) 01/16/2024    HCT 39.8 (L) 01/16/2024    MCV 94 01/16/2024     01/16/2024       Reviewed previous Urology record:He has previously been seen by Dr. Luna.   He has been on  "testosterone replacement.  [facility].  He is on testosterone injection as well as Hcg Injections.  He injects 2 times per week.    He has no bothersome voiding complaints.  He has urinary frequency and takes Flomax but not always consistently.  His flow is ok.  He has low back pain "prostatitis" when he drinks too much caffeine.   Brother had prostate cancer.  Previous PSA have been in normal range.  Urine dipstick shows negative for all components.         Current Assessment & Plan     Continue follow-up with Urology.  Check testosterone level.  Check CBC.  Check PSA.  The natural history of prostate cancer and ongoing controversy regarding screening and potential treatment outcomes of prostate cancer has been discussed with the patient. The meaning of a false positive PSA and a false negative PSA has been discussed. He indicates understanding of the limitations of this screening test and wishes  to proceed with screening PSA testing.           Benzodiazepine dependence, continuous (Chronic)    Overview     Chronic.  Stable.   reviewed.  On long-term Xanax as needed for panic attacks and anxiety         Current Assessment & Plan     Stable condition.  Refilling medication.         Encounter for long-term (current) use of other medications    Overview     June 2024:  Reviewed labs.  January 2024: Reviewed labs.  August 2023: Reviewed labs.  Initial HPI:  CHRONIC. Stable. Compliant with medications for managed conditions. See medication list. No SE reported. Routine lab analysis is being monitored. Refills were addressed.February 2021:CHRONIC. Stable. Compliant with medications for managed conditions. See medication list. No SE reported. Routine lab analysis is being monitored. Refills were addressed.  October 2021:  Reviewed labs.  May 2022:  Reviewed labs.  Lab Results   Component Value Date    WBC 4.82 01/16/2024    HGB 13.2 (L) 01/16/2024    HCT 39.8 (L) 01/16/2024    MCV 94 01/16/2024     01/16/2024 "         Chemistry        Component Value Date/Time     12/15/2023 1503    K 4.7 12/18/2023 1210     12/15/2023 1503    CO2 24 12/15/2023 1503    BUN 36 (H) 12/15/2023 1503    CREATININE 1.0 12/15/2023 1503     12/15/2023 1503        Component Value Date/Time    CALCIUM 9.7 12/15/2023 1503    ALKPHOS 103 05/27/2022 1129    AST 24 05/27/2022 1129    ALT 32 05/27/2022 1129    BILITOT 0.5 05/27/2022 1129    ESTGFRAFRICA >60.0 05/27/2022 1129    EGFRNONAA >60.0 05/27/2022 1129        Lab Results   Component Value Date    TSH 1.746 10/30/2023    C7FXADC 6.5 06/30/2011            Current Assessment & Plan     Complete history and physical was completed today.  Complete and thorough medication reconciliation was performed.  Discussed risks and benefits of medications.  Advised patient on orders and health maintenance.  We discussed old records and old labs if available.  Will request any records not available through epic.  Continue current medications listed on your summary sheet.            Other Visit Diagnoses       Lipid screening        Encounter for prostate cancer screening                 Review of patient's allergies indicates:   Allergen Reactions    No known drug allergies      Current Outpatient Medications   Medication Instructions    albuterol (PROVENTIL/VENTOLIN HFA) 90 mcg/actuation inhaler 2 puffs, Inhalation, Every 6 hours PRN    albuterol (PROVENTIL/VENTOLIN HFA) 90 mcg/actuation inhaler 2 puffs, Inhalation, Every 6 hours PRN    alfuzosin (UROXATRAL) 10 mg Tb24 No dose, route, or frequency recorded.    ALPRAZolam (XANAX) 1 mg, Oral, 3 times daily    aspirin 81 mg, Oral, Daily    buPROPion (WELLBUTRIN XL) 300 MG 24 hr tablet Take 1 tablet (300 mg total) by mouth once daily.    cholecalciferol (vitamin D3) (VITAMIN D3) 1,000 Units, Oral    clopidogreL (PLAVIX) 75 mg, Oral, Daily    co-enzyme Q-10 30 mg, Oral, Daily    cyanocobalamin (VITAMIN B-12) 100 mcg, Oral    diclofenac (VOLTAREN)  "75 mg, Oral, 2 times daily    DIINDOLYLMETHANE, BULK, MISC 100 mg, Other    docusate sodium (COLACE) 100 mg, Oral, 2 times daily PRN    dorzolamide-timolol, PF, (COSOPT PF) 2-0.5 % Dpet ophthalmic solution No dose, route, or frequency recorded.    famotidine (PEPCID) 40 mg, Oral, Daily    fish oil-omega-3 fatty acids 300-1,000 mg capsule 1 capsule, Oral, Daily    latanoprost 0.005 % ophthalmic solution 1 drop, Both Eyes, Nightly    lisinopriL (PRINIVIL,ZESTRIL) 40 mg, Oral, Daily    magnesium oxide (MAG-OX) 400 mg, Oral, Daily    meclizine (ANTIVERT) 25 mg, Oral, 3 times daily PRN    terazosin (HYTRIN) 5 MG capsule Oral    testosterone cypionate (DEPOTESTOTERONE CYPIONATE) 100 mg/mL injection Intramuscular    zinc gluconate 50 mg, Oral      I have reviewed the PMH, social history, FamilyHx, surgical history, allergies and medications documented / confirmed by the patient at the time of this visit.  Review of Systems   Constitutional:  Positive for fatigue. Negative for chills, fever and unexpected weight change.   HENT:  Negative for ear pain and sore throat.    Eyes:  Negative for redness and visual disturbance.   Respiratory:  Negative for cough and shortness of breath.    Cardiovascular:  Negative for chest pain and palpitations.   Gastrointestinal:  Negative for nausea and vomiting.   Endocrine: Negative for cold intolerance and heat intolerance.   Genitourinary:  Negative for difficulty urinating and hematuria.   Musculoskeletal:  Positive for arthralgias. Negative for myalgias.   Skin:  Negative for rash and wound.   Allergic/Immunologic: Negative for environmental allergies and food allergies.   Neurological:  Negative for weakness and headaches.   Hematological:  Negative for adenopathy. Does not bruise/bleed easily.   Psychiatric/Behavioral:  Negative for sleep disturbance. The patient is not nervous/anxious.      Objective:   /82   Pulse 87   Ht 5' 11" (1.803 m)   Wt 99.4 kg (219 lb 1.6 oz)   " SpO2 97%   BMI 30.56 kg/m²   Physical Exam  Vitals and nursing note reviewed.   Constitutional:       General: He is not in acute distress.     Appearance: He is well-developed. He is not diaphoretic.   HENT:      Head: Normocephalic and atraumatic.      Right Ear: External ear normal.      Left Ear: External ear normal.      Nose: Nose normal. No rhinorrhea.   Eyes:      Extraocular Movements: Extraocular movements intact.      Pupils: Pupils are equal, round, and reactive to light.   Cardiovascular:      Rate and Rhythm: Normal rate.      Pulses: Normal pulses.   Pulmonary:      Effort: Pulmonary effort is normal. No respiratory distress.      Breath sounds: Normal breath sounds.   Abdominal:      General: Bowel sounds are normal.      Palpations: Abdomen is soft.   Musculoskeletal:         General: No tenderness or deformity. Normal range of motion.      Cervical back: Normal range of motion and neck supple.      Right lower leg: No edema.      Left lower leg: No edema.   Skin:     General: Skin is warm and dry.      Capillary Refill: Capillary refill takes less than 2 seconds.      Findings: No rash.   Neurological:      General: No focal deficit present.      Mental Status: He is alert and oriented to person, place, and time. Mental status is at baseline.      Cranial Nerves: No cranial nerve deficit.      Motor: No weakness.      Gait: Gait normal.   Psychiatric:         Attention and Perception: He is attentive.         Mood and Affect: Mood normal. Mood is not anxious or depressed. Affect is not labile, blunt, angry or inappropriate.         Speech: He is communicative. Speech is not rapid and pressured, delayed, slurred or tangential.         Behavior: Behavior normal. Behavior is not agitated, slowed, aggressive, withdrawn, hyperactive or combative.         Thought Content: Thought content normal. Thought content is not paranoid or delusional. Thought content does not include homicidal or suicidal  ideation. Thought content does not include homicidal or suicidal plan.         Cognition and Memory: Memory is not impaired.         Judgment: Judgment normal. Judgment is not impulsive or inappropriate.       Physical Exam  Clear lungs.  Normal heart sounds.    Results      Assessment:     1. Hypotestosteronemia    2. Encounter for long-term (current) use of other medications    3. Lipid screening    4. Encounter for prostate cancer screening    5. Benzodiazepine dependence, continuous    6. Encounter for long-term (current) use of medications    7. Anxiety      MDM:   Moderate medical complexity.  Moderate risk.  Total time: 21 minutes.  This includes total time spent on the encounter, which includes face to face time and non-face to face time preparing to see the patient (eg, review of previous medical records, tests), Obtaining and/or reviewing separately obtained history, documenting clinical information in the electronic or other health record, independently interpreting results (not separately reported)/communicating results to the patient/family/caregiver, and/or care coordination (not separately reported).    I have Reviewed and summarized old records.  I have performed thorough medication reconciliation today and discussed risk and benefits of medications.  I have reviewed labs and discussed with patient.  All questions were answered.  I am requesting old records and will review them once they are available.Visit today included increased complexity associated with the care of the episodic problem see above assessment addressed and managing the longitudinal care of the patient due to the serious and/or complex managed problem(s) see above.    I have signed for the following orders AND/OR meds.  Orders Placed This Encounter   Procedures    CBC Without Differential     Standing Status:   Future     Standing Expiration Date:   8/20/2025    Comprehensive Metabolic Panel     Standing Status:   Future      Standing Expiration Date:   8/20/2025    TSH     Standing Status:   Future     Standing Expiration Date:   8/20/2025    Hemoglobin A1C     Standing Status:   Future     Standing Expiration Date:   8/20/2025    Lipid Panel     Standing Status:   Future     Standing Expiration Date:   8/20/2025    PSA, SCREENING     Standing Status:   Future     Standing Expiration Date:   9/19/2025    Testosterone     Standing Status:   Future     Standing Expiration Date:   8/20/2025     Medications Ordered This Encounter   Medications    ALPRAZolam (XANAX) 1 MG tablet     Sig: Take 1 tablet (1 mg total) by mouth 3 (three) times daily.     Dispense:  90 tablet     Refill:  5     This request is for a new prescription for a controlled substance as required by Federal/State law.        Follow up in about 6 months (around 12/21/2024), or if symptoms worsen or fail to improve, for Med refills, LAB RESULTS.  Future Appointments       Date Provider Specialty Appt Notes    6/24/2024  Lab     8/14/2024  Lab [facility]    8/19/2024 Loy Brewster Jr., MD Cardiology 6 month f/u          If no improvement in symptoms or symptoms worsen, advised to call/follow-up at clinic or go to ER. Patient voiced understanding and all questions/concerns were addressed.   DISCLAIMER: This note was compiled by using a speech recognition dictation system and therefore please be aware that typographical / speech recognition errors can and do occur.  Please contact me if you see any errors specifically.  Consent was obtained for SOFY recording system prior to the visit.    Pedro Pablo Dc M.D.       Office: [phone] [address] [place] [address]  FAX: [phone]

## 2024-06-24 ENCOUNTER — LAB VISIT (OUTPATIENT)
Dept: LAB | Facility: HOSPITAL | Age: 74
End: 2024-06-24
Attending: FAMILY MEDICINE
Payer: MEDICARE

## 2024-06-24 DIAGNOSIS — Z12.5 ENCOUNTER FOR PROSTATE CANCER SCREENING: ICD-10-CM

## 2024-06-24 DIAGNOSIS — E34.9 HYPOTESTOSTERONEMIA: ICD-10-CM

## 2024-06-24 DIAGNOSIS — Z79.899 ENCOUNTER FOR LONG-TERM (CURRENT) USE OF OTHER MEDICATIONS: ICD-10-CM

## 2024-06-24 DIAGNOSIS — Z13.220 LIPID SCREENING: ICD-10-CM

## 2024-06-24 LAB
ALBUMIN SERPL BCP-MCNC: 3.8 G/DL (ref 3.5–5.2)
ALP SERPL-CCNC: 78 U/L (ref 55–135)
ALT SERPL W/O P-5'-P-CCNC: 25 U/L (ref 10–44)
ANION GAP SERPL CALC-SCNC: 6 MMOL/L (ref 8–16)
AST SERPL-CCNC: 21 U/L (ref 10–40)
BILIRUB SERPL-MCNC: 0.6 MG/DL (ref 0.1–1)
BUN SERPL-MCNC: 23 MG/DL (ref 8–23)
CALCIUM SERPL-MCNC: 9.7 MG/DL (ref 8.7–10.5)
CHLORIDE SERPL-SCNC: 105 MMOL/L (ref 95–110)
CHOLEST SERPL-MCNC: 141 MG/DL (ref 120–199)
CHOLEST/HDLC SERPL: 3.4 {RATIO} (ref 2–5)
CO2 SERPL-SCNC: 25 MMOL/L (ref 23–29)
COMPLEXED PSA SERPL-MCNC: 3 NG/ML (ref 0–4)
CREAT SERPL-MCNC: 1.1 MG/DL (ref 0.5–1.4)
ERYTHROCYTE [DISTWIDTH] IN BLOOD BY AUTOMATED COUNT: 13 % (ref 11.5–14.5)
EST. GFR  (NO RACE VARIABLE): >60 ML/MIN/1.73 M^2
ESTIMATED AVG GLUCOSE: 97 MG/DL (ref 68–131)
GLUCOSE SERPL-MCNC: 91 MG/DL (ref 70–110)
HBA1C MFR BLD: 5 % (ref 4–5.6)
HCT VFR BLD AUTO: 43.7 % (ref 40–54)
HDLC SERPL-MCNC: 42 MG/DL (ref 40–75)
HDLC SERPL: 29.8 % (ref 20–50)
HGB BLD-MCNC: 14.3 G/DL (ref 14–18)
LDLC SERPL CALC-MCNC: 87 MG/DL (ref 63–159)
MCH RBC QN AUTO: 31.8 PG (ref 27–31)
MCHC RBC AUTO-ENTMCNC: 32.7 G/DL (ref 32–36)
MCV RBC AUTO: 97 FL (ref 82–98)
NONHDLC SERPL-MCNC: 99 MG/DL
PLATELET # BLD AUTO: 220 K/UL (ref 150–450)
PMV BLD AUTO: 10.6 FL (ref 9.2–12.9)
POTASSIUM SERPL-SCNC: 5 MMOL/L (ref 3.5–5.1)
PROT SERPL-MCNC: 6.7 G/DL (ref 6–8.4)
RBC # BLD AUTO: 4.49 M/UL (ref 4.6–6.2)
SODIUM SERPL-SCNC: 136 MMOL/L (ref 136–145)
TESTOST SERPL-MCNC: >1500 NG/DL (ref 304–1227)
TRIGL SERPL-MCNC: 60 MG/DL (ref 30–150)
TSH SERPL DL<=0.005 MIU/L-ACNC: 2.6 UIU/ML (ref 0.4–4)
WBC # BLD AUTO: 4.68 K/UL (ref 3.9–12.7)

## 2024-06-24 PROCEDURE — 83036 HEMOGLOBIN GLYCOSYLATED A1C: CPT | Mod: HCNC | Performed by: FAMILY MEDICINE

## 2024-06-24 PROCEDURE — 80061 LIPID PANEL: CPT | Mod: HCNC | Performed by: FAMILY MEDICINE

## 2024-06-24 PROCEDURE — 84443 ASSAY THYROID STIM HORMONE: CPT | Mod: HCNC | Performed by: FAMILY MEDICINE

## 2024-06-24 PROCEDURE — 36415 COLL VENOUS BLD VENIPUNCTURE: CPT | Mod: HCNC,PO | Performed by: FAMILY MEDICINE

## 2024-06-24 PROCEDURE — 80053 COMPREHEN METABOLIC PANEL: CPT | Mod: HCNC | Performed by: FAMILY MEDICINE

## 2024-06-24 PROCEDURE — 85027 COMPLETE CBC AUTOMATED: CPT | Mod: HCNC | Performed by: FAMILY MEDICINE

## 2024-06-24 PROCEDURE — 84403 ASSAY OF TOTAL TESTOSTERONE: CPT | Mod: HCNC | Performed by: FAMILY MEDICINE

## 2024-06-24 PROCEDURE — 84153 ASSAY OF PSA TOTAL: CPT | Mod: HCNC | Performed by: FAMILY MEDICINE

## 2024-06-25 ENCOUNTER — PATIENT MESSAGE (OUTPATIENT)
Dept: FAMILY MEDICINE | Facility: CLINIC | Age: 74
End: 2024-06-25
Payer: MEDICARE

## 2024-06-25 DIAGNOSIS — M25.562 ACUTE PAIN OF LEFT KNEE: Primary | ICD-10-CM

## 2024-06-26 NOTE — PROGRESS NOTES
Make follow-up lab appointment per recommendation below.  Check to see if patient has seen the results through my chart.  If not then,  #CALL THE PATIENT# to discuss results/see if they have questions and document verification of contact. Make F/U appt if needed. [phone]    #My interpretation that was sent to them through [facility]:  Andrés, I have reviewed your recent blood work.     PSA screening for prostate cancer is within normal limits.  Repeat annually.  Testosterone level is too high.  Your complete blood count is improved.    Your metabolic panel which shows your glucose, kidney function, electrolytes, and liver function is normal.   Thyroid study is normal.   Your cholesterol is improved.    Your hemoglobin A1c is normal.  This test is gold standard screening test for diabetes.  It is a measures 3 months of your average blood sugar.  =========================  Also please address any outstanding health maintenance that may be due: TETANUS VACCINE Never done  High Dose Statin Never done  Shingles Vaccine(1 of 2) Never done  RSV Vaccine (Age 60+ and Pregnant patients)(1 - 1-dose 60+ series) Never done  Colorectal Cancer Screening due on 01/22/2023

## 2024-06-27 NOTE — TELEPHONE ENCOUNTER
I have signed for the following orders AND/OR meds.  Please call the patient and ask the patient to schedule the testing AND/OR inform about any medications that were sent.     Orders Placed This Encounter   Procedures    Ambulatory referral/consult to Physical/Occupational Therapy     Standing Status:   Future     Standing Expiration Date:   7/27/2025     Referral Priority:   Routine     Referral Type:   Physical Medicine     Referral Reason:   Specialty Services Required     Number of Visits Requested:   1

## 2024-08-02 ENCOUNTER — TELEPHONE (OUTPATIENT)
Dept: FAMILY MEDICINE | Facility: CLINIC | Age: 74
End: 2024-08-02
Payer: MEDICARE

## 2024-08-02 NOTE — TELEPHONE ENCOUNTER
Call returned. Pt stated that back on 06/24/2024 when he had his labs drawn, he had a few abnormalities that concerns him. His testosterone level was elevated, but per pt he just had his testosterone injection the day before (06/23/2024) and under the CMP there is a level called, Anion Gap that was 6, normal range is between 8-16. Pt stated that he goggled the anion gap and saw a few disturbing causes that made him concerned (cancer, kidney issues, etc). Pt wants to know if you suggest that he has those following levels rechecked just to put him at ease. Pt was advised that I will forward this message to you for further review. Please advise. Thanks.

## 2024-08-02 NOTE — TELEPHONE ENCOUNTER
----- Message from Alma Ren sent at 8/2/2024 12:03 PM CDT -----  .Type:  Needs Medical Advice    Who Called: pt    Would the patient rather a call back or a response via [facility]? Call back  Best Call Back Number: [phone]  Additional Information:     Pt stated he missed a call and would like a call back please

## 2024-08-02 NOTE — TELEPHONE ENCOUNTER
----- Message from Cece Ha sent at 8/1/2024  7:49 PM CDT -----  Type:  Patient Returning Call    Who Called:pt  Who Left Message for Patient:pt  Does the patient know what this is regarding?:pt want a call to repeat labs and need orders put in   Would the patient rather a call back or a response via [facility]? call  Best Call Back Number:[phone]  Additional Information: call back

## 2024-08-02 NOTE — TELEPHONE ENCOUNTER
Blood work is not overly concerning at this time.  Please let him know that I will review the blood work in detail over the next few days.  Anion gap is not a concern at this time.

## 2024-08-14 ENCOUNTER — LAB VISIT (OUTPATIENT)
Dept: LAB | Facility: HOSPITAL | Age: 74
End: 2024-08-14
Attending: INTERNAL MEDICINE
Payer: MEDICARE

## 2024-08-14 DIAGNOSIS — Z95.5 STATUS POST CORONARY ARTERY STENT PLACEMENT: ICD-10-CM

## 2024-08-14 DIAGNOSIS — E78.5 HYPERLIPIDEMIA, UNSPECIFIED HYPERLIPIDEMIA TYPE: ICD-10-CM

## 2024-08-14 LAB
CHOLEST SERPL-MCNC: 166 MG/DL (ref 120–199)
CHOLEST/HDLC SERPL: 3.1 {RATIO} (ref 2–5)
HDLC SERPL-MCNC: 54 MG/DL (ref 40–75)
HDLC SERPL: 32.5 % (ref 20–50)
LDLC SERPL CALC-MCNC: 99.2 MG/DL (ref 63–159)
NONHDLC SERPL-MCNC: 112 MG/DL
TRIGL SERPL-MCNC: 64 MG/DL (ref 30–150)

## 2024-08-14 PROCEDURE — 36415 COLL VENOUS BLD VENIPUNCTURE: CPT | Mod: HCNC,PO | Performed by: INTERNAL MEDICINE

## 2024-08-14 PROCEDURE — 80061 LIPID PANEL: CPT | Mod: HCNC | Performed by: INTERNAL MEDICINE

## 2024-08-19 ENCOUNTER — OFFICE VISIT (OUTPATIENT)
Dept: CARDIOLOGY | Facility: CLINIC | Age: 74
End: 2024-08-19
Payer: MEDICARE

## 2024-08-19 VITALS
WEIGHT: 217.88 LBS | OXYGEN SATURATION: 98 % | SYSTOLIC BLOOD PRESSURE: 118 MMHG | DIASTOLIC BLOOD PRESSURE: 70 MMHG | BODY MASS INDEX: 30.5 KG/M2 | HEIGHT: 71 IN | HEART RATE: 76 BPM

## 2024-08-19 DIAGNOSIS — R94.31 ABNORMAL ECG: ICD-10-CM

## 2024-08-19 DIAGNOSIS — I25.10 CORONARY ARTERY DISEASE INVOLVING NATIVE CORONARY ARTERY OF NATIVE HEART WITHOUT ANGINA PECTORIS: ICD-10-CM

## 2024-08-19 DIAGNOSIS — Z95.5 STATUS POST CORONARY ARTERY STENT PLACEMENT: ICD-10-CM

## 2024-08-19 DIAGNOSIS — I10 ESSENTIAL HYPERTENSION: Primary | ICD-10-CM

## 2024-08-19 DIAGNOSIS — I45.2 RBBB (RIGHT BUNDLE BRANCH BLOCK WITH LEFT ANTERIOR FASCICULAR BLOCK): ICD-10-CM

## 2024-08-19 PROCEDURE — 99214 OFFICE O/P EST MOD 30 MIN: CPT | Mod: HCNC,S$GLB,, | Performed by: INTERNAL MEDICINE

## 2024-08-19 PROCEDURE — 1101F PT FALLS ASSESS-DOCD LE1/YR: CPT | Mod: HCNC,CPTII,S$GLB, | Performed by: INTERNAL MEDICINE

## 2024-08-19 PROCEDURE — 4010F ACE/ARB THERAPY RXD/TAKEN: CPT | Mod: HCNC,CPTII,S$GLB, | Performed by: INTERNAL MEDICINE

## 2024-08-19 PROCEDURE — 1126F AMNT PAIN NOTED NONE PRSNT: CPT | Mod: HCNC,CPTII,S$GLB, | Performed by: INTERNAL MEDICINE

## 2024-08-19 PROCEDURE — 99999 PR PBB SHADOW E&M-EST. PATIENT-LVL II: CPT | Mod: PBBFAC,HCNC,, | Performed by: INTERNAL MEDICINE

## 2024-08-19 PROCEDURE — 3044F HG A1C LEVEL LT 7.0%: CPT | Mod: HCNC,CPTII,S$GLB, | Performed by: INTERNAL MEDICINE

## 2024-08-19 PROCEDURE — 3078F DIAST BP <80 MM HG: CPT | Mod: HCNC,CPTII,S$GLB, | Performed by: INTERNAL MEDICINE

## 2024-08-19 PROCEDURE — 3288F FALL RISK ASSESSMENT DOCD: CPT | Mod: HCNC,CPTII,S$GLB, | Performed by: INTERNAL MEDICINE

## 2024-08-19 PROCEDURE — 3008F BODY MASS INDEX DOCD: CPT | Mod: HCNC,CPTII,S$GLB, | Performed by: INTERNAL MEDICINE

## 2024-08-19 PROCEDURE — 3074F SYST BP LT 130 MM HG: CPT | Mod: HCNC,CPTII,S$GLB, | Performed by: INTERNAL MEDICINE

## 2024-08-19 NOTE — PROGRESS NOTES
Subjective   Patient ID:  Andrés Casillas is a 74 y.o. male who presents for follow-up of Follow-up      HPI74 yo WM S/P RCA stent 12/18/2023 Denies chest pain, SOB, or edema  Denies palpitations, weak spells, and syncope Does have easy bruising.     Summary          The Prox RCA lesion was 90% stenosed with 0% stenosis post-intervention.    The ejection fraction was calculated to be 60%.    The pre-procedure left ventricular end diastolic pressure was 23.    The post-procedure left ventricular end diastolic pressure was 25.    Prox RCA lesion: A .    Prox RCA lesion: A .    Prox RCA lesion: A stent was successfully placed at 13 NAYELI for 20 sec.    The estimated blood loss was none.    The PCI was successful.     The procedure log was documented by Documenter: Nicola Lziarraga RN and verified by Dylan Johnson MD.     Date: 12/18/2023  Time: 6:35 PM    Review of Systems   Constitutional: Negative for decreased appetite, fever, malaise/fatigue, weight gain and weight loss.   HENT:  Negative for hearing loss and nosebleeds.    Eyes:  Negative for visual disturbance.   Cardiovascular:  Negative for chest pain, claudication, cyanosis, dyspnea on exertion, irregular heartbeat, leg swelling, near-syncope, orthopnea, palpitations, paroxysmal nocturnal dyspnea and syncope.   Respiratory:  Negative for cough, hemoptysis, shortness of breath, sleep disturbances due to breathing, snoring and wheezing.    Endocrine: Negative for cold intolerance, heat intolerance, polydipsia and polyuria.   Hematologic/Lymphatic: Negative for adenopathy and bleeding problem. Bruises/bleeds easily.   Skin:  Negative for color change, itching, poor wound healing, rash and suspicious lesions.   Musculoskeletal:  Positive for arthritis and joint pain. Negative for back pain, falls, joint swelling, muscle cramps, muscle weakness and myalgias.   Gastrointestinal:  Negative for bloating, abdominal pain, change in bowel habit, constipation, flatus,  "heartburn, hematemesis, hematochezia, hemorrhoids, jaundice, melena, nausea and vomiting.   Genitourinary:  Negative for bladder incontinence, decreased libido, frequency, hematuria, hesitancy and urgency.   Neurological:  Negative for brief paralysis, difficulty with concentration, excessive daytime sleepiness, dizziness, focal weakness, headaches, light-headedness, loss of balance, numbness, vertigo and weakness.   Psychiatric/Behavioral:  Negative for altered mental status, depression and memory loss. The patient does not have insomnia and is not nervous/anxious.    Allergic/Immunologic: Negative for environmental allergies, hives and persistent infections.          Objective     Physical Exam  Constitutional:       General: He is not in acute distress.     Appearance: He is well-developed. He is not diaphoretic.      Comments: /70   Pulse 76   Ht 5' 11" (1.803 m)   Wt 98.8 kg (217 lb 14.4 oz)   SpO2 98%   BMI 30.39 kg/m²      HENT:      Head: Normocephalic and atraumatic.   Eyes:      General: Lids are normal. No scleral icterus.        Right eye: No discharge.      Conjunctiva/sclera: Conjunctivae normal.      Pupils: Pupils are equal, round, and reactive to light.   Neck:      Thyroid: No thyromegaly.      Vascular: No JVD.      Trachea: No tracheal deviation.   Cardiovascular:      Rate and Rhythm: Normal rate and regular rhythm.      Pulses: Intact distal pulses.           Carotid pulses are 2+ on the right side and 2+ on the left side.       Radial pulses are 2+ on the right side and 2+ on the left side.        Femoral pulses are 2+ on the right side and 2+ on the left side.       Popliteal pulses are 2+ on the right side and 2+ on the left side.        Dorsalis pedis pulses are 2+ on the right side and 2+ on the left side.        Posterior tibial pulses are 2+ on the right side and 2+ on the left side.      Heart sounds: Normal heart sounds, S1 normal and S2 normal. No murmur heard.     No " friction rub. No gallop.   Pulmonary:      Effort: Pulmonary effort is normal. No respiratory distress.      Breath sounds: Normal breath sounds. No wheezing or rales.   Chest:      Chest wall: No tenderness.   Abdominal:      General: Bowel sounds are normal. There is no distension.      Palpations: Abdomen is soft. There is no hepatomegaly or mass.      Tenderness: There is no abdominal tenderness. There is no guarding or rebound.   Musculoskeletal:         General: No tenderness. Normal range of motion.      Cervical back: Normal range of motion and neck supple.   Lymphadenopathy:      Cervical: No cervical adenopathy.   Skin:     General: Skin is warm and dry.      Coloration: Skin is not pale.      Findings: Bruising present. No erythema or rash.   Neurological:      Mental Status: He is alert and oriented to person, place, and time.      Cranial Nerves: No cranial nerve deficit.      Coordination: Coordination normal.      Deep Tendon Reflexes: Reflexes are normal and symmetric.   Psychiatric:         Speech: Speech normal.         Behavior: Behavior normal.         Thought Content: Thought content normal.         Judgment: Judgment normal.            Assessment and Plan     1. Essential hypertension    2. Coronary artery disease involving native coronary artery of native heart without angina pectoris    3. RBBB (right bundle branch block with left anterior fascicular block)    4. Abnormal ECG    5. Status post coronary artery stent placement        Plan:  Cardiac status stable   BP controlled   Patient advised to modify risk factors such as weight, exercise, diet,  tobacco and alcohol exposure    Can take plavix every other day  No orders of the defined types were placed in this encounter.    Follow up in about 6 months (around 2/19/2025).     Advance Care Planning     Date: 08/19/2024

## 2024-08-26 DIAGNOSIS — F41.9 ANXIETY: Chronic | ICD-10-CM

## 2024-08-26 RX ORDER — BUPROPION HYDROCHLORIDE 300 MG/1
TABLET ORAL
Qty: 90 TABLET | Refills: 3 | Status: SHIPPED | OUTPATIENT
Start: 2024-08-26

## 2024-08-26 NOTE — TELEPHONE ENCOUNTER
Refill Decision Note   Andrés Casillas  is requesting a refill authorization.  Brief Assessment and Rationale for Refill:  Approve     Medication Therapy Plan:         Comments:     Note composed:2:35 AM 08/26/2024

## 2024-08-26 NOTE — TELEPHONE ENCOUNTER
No care due was identified.  [facility] Embedded Care Due Messages. Reference number: [id number].   8/26/2024 12:08:01 AM CDT

## 2024-09-11 ENCOUNTER — PATIENT MESSAGE (OUTPATIENT)
Dept: FAMILY MEDICINE | Facility: CLINIC | Age: 74
End: 2024-09-11
Payer: MEDICARE

## 2024-10-11 DIAGNOSIS — I10 ESSENTIAL (PRIMARY) HYPERTENSION: ICD-10-CM

## 2024-10-11 DIAGNOSIS — R94.31 ABNORMAL ELECTROCARDIOGRAM (ECG) (EKG): ICD-10-CM

## 2024-10-11 RX ORDER — LISINOPRIL 40 MG/1
40 TABLET ORAL
Qty: 90 TABLET | Refills: 3 | Status: SHIPPED | OUTPATIENT
Start: 2024-10-11

## 2024-11-14 ENCOUNTER — TELEPHONE (OUTPATIENT)
Dept: CARDIOLOGY | Facility: CLINIC | Age: 74
End: 2024-11-14
Payer: MEDICARE

## 2024-11-14 NOTE — TELEPHONE ENCOUNTER
Attempted unsuccessfully x2 to reach patient. Left voicemail for patient to call back to discuss  medication concerns.

## 2024-11-14 NOTE — TELEPHONE ENCOUNTER
----- Message from Mary sent at 11/14/2024  1:48 PM CST -----   Type:  Needs Medical Advice    Who Called: PT  Best Call Back Number: [phone]        Additional Information: PT states he need to get off blood thinner. Please call back to advise. Thank you!

## 2024-11-24 ENCOUNTER — PATIENT MESSAGE (OUTPATIENT)
Dept: CARDIOLOGY | Facility: CLINIC | Age: 74
End: 2024-11-24
Payer: MEDICARE

## 2025-01-08 DIAGNOSIS — F41.9 ANXIETY: Chronic | ICD-10-CM

## 2025-01-08 DIAGNOSIS — Z79.899 ENCOUNTER FOR LONG-TERM (CURRENT) USE OF MEDICATIONS: ICD-10-CM

## 2025-01-08 NOTE — TELEPHONE ENCOUNTER
----- Message from CARMENToma sent at 1/8/2025  3:39 PM CST -----  Contact: self [phone]  Would like to receive medical advice.       Pharmacy name/number (copy/paste from chart):      [facility] - [place] - [id number]  [id number]  [place] [id number]  Phone: [phone] Fax: [phone]    Would they like a call back or a response via [facility]:  call back     Additional information:  Calling to speak with the office about getting a refill on ALPRAZolam (XANAX) 1 MG tablet or to get a med refill appt if necessary.

## 2025-01-08 NOTE — TELEPHONE ENCOUNTER
No care due was identified.  [facility] Embedded Care Due Messages. Reference number: [id number].   1/08/2025 3:24:08 PM CST

## 2025-01-09 ENCOUNTER — OFFICE VISIT (OUTPATIENT)
Dept: FAMILY MEDICINE | Facility: CLINIC | Age: 75
End: 2025-01-09
Payer: MEDICARE

## 2025-01-09 ENCOUNTER — PATIENT MESSAGE (OUTPATIENT)
Dept: FAMILY MEDICINE | Facility: CLINIC | Age: 75
End: 2025-01-09

## 2025-01-09 DIAGNOSIS — F13.20 BENZODIAZEPINE DEPENDENCE, CONTINUOUS: ICD-10-CM

## 2025-01-09 DIAGNOSIS — Z95.5 STATUS POST CORONARY ARTERY STENT PLACEMENT: ICD-10-CM

## 2025-01-09 DIAGNOSIS — R94.31 ABNORMAL ECG: ICD-10-CM

## 2025-01-09 DIAGNOSIS — J44.9 CHRONIC OBSTRUCTIVE PULMONARY DISEASE, UNSPECIFIED COPD TYPE: ICD-10-CM

## 2025-01-09 DIAGNOSIS — Z79.899 ENCOUNTER FOR LONG-TERM CURRENT USE OF MEDICATION: ICD-10-CM

## 2025-01-09 DIAGNOSIS — E34.9 HYPOTESTOSTERONEMIA: Chronic | ICD-10-CM

## 2025-01-09 DIAGNOSIS — F41.9 ANXIETY: Primary | Chronic | ICD-10-CM

## 2025-01-09 RX ORDER — ALPRAZOLAM 1 MG/1
1 TABLET ORAL 3 TIMES DAILY
Qty: 90 TABLET | Refills: 5 | OUTPATIENT
Start: 2025-01-09

## 2025-01-09 RX ORDER — TERAZOSIN 5 MG/1
5 CAPSULE ORAL NIGHTLY
COMMUNITY

## 2025-01-09 RX ORDER — ALPRAZOLAM 1 MG/1
1 TABLET ORAL 3 TIMES DAILY
Qty: 90 TABLET | Refills: 5 | Status: SHIPPED | OUTPATIENT
Start: 2025-01-09

## 2025-01-09 NOTE — TELEPHONE ENCOUNTER
Patient is due for an appointment.  Please schedule appointment with [facility] for medication  refill.

## 2025-01-09 NOTE — PROGRESS NOTES
Primary Care Telemedicine Note  The patient location is:  Patient's Home - [place]  The chief complaint leading to consultation is:   Chief Complaint   Patient presents with    Medication Refill      Total time:  see MDM below. The total time spent on the encounter, which includes face to face time and non-face to face time preparing to see the patient (eg, review of previous medical records, tests), Obtaining and/or reviewing separately obtained history, documenting clinical information in the electronic or other health record, independently interpreting results (not separately reported)/communicating results to the patient/family/caregiver, and/or care coordination (not separately reported).    Visit type: Virtual visit with synchronous audio only and video  Each patient to whom he or she provides medical services by telemedicine is:  (1) informed of the relationship between the physician and patient and the respective role of any other health care provider with respect to management of the patient; and (2) notified that he or she may decline to receive medical services by telemedicine and may withdraw from such care at any time.  =================================================================    Assessment/Plan:  Problem List Items Addressed This Visit          Psychiatric    Anxiety - Primary (Chronic)    Overview     Jan 2025: no changes in history. Remains on Xanax PRN as prescribed. Symptoms are stable. He has been on this medication for a few years. No SE reported.     June 2024:  Patient needing medication refills.  January 2024:  Patient reports compliance with medications.  No side effects reported.  Symptoms are controlled.  Patient has increased anxiety about his health.  Recently had stent placed in his heart.    Chronic.  August 2023: Patient here for medication refill.  Reports compliance.  No side effects reported.  Symptoms are controlled.  Stable.  Patient on chronic benzodiazepine from  previous PCP.  Transitioning care to me.The patient was checked in the [facility] [employer]acy's  Prescription Monitoring Program. There appears to be no incongruities with the patient's verbalized history.   No abuse suspected.  February 2021:  Patient had PVCs on cardiac workup.  Patient reports cardiology has adjusted medications that he is doing much better.  Blood pressure has been well controlled.  May 2022:  Patient is here for medication refills.  He continues to have symptoms that are intermittently controlled with medication.         Current Assessment & Plan      reviewed. Discussed medication risks. Refill Xanax for six months.  Follow-up in six months.  Discussed condition course and signs and symptoms to expect.  Patient advised of risk and benefits of medication use.  ER precautions.  Call MD or follow-up to clinic if not improving or worsening symptoms.    Please be advised of condition course.  SNRI/SSRI is first-line treatment for this condition.  Please be advised of the risk of discontinuing this medication without tapering/contacting MD.  Patient has been advised of side effects, and all questions were answered.  Patient voiced understanding.  Patient will follow up routinely and notify us if having any side effects or worsening or persistent symptoms.  ER precautions were given. Antidepressant/Antianxiety Medication Initiation:  Patient informed of risks, benefits, and potential side effects of medication and accepts informed consent.  Common side effects include nausea, fatigue, headache, insomnia, etc see medication insert for complete side effect profile.  Most importantly be advised of the possibility of new or worsening suicidal thoughts/depression/anxiety etcetera.  Please be advised to stop the medication immediately and seek urgent treatment if this occurs.  Therefore please do not to abruptly discontinue medication without physician guidance except in cases of sudden onset  or worsening of SI.            Relevant Medications    ALPRAZolam (XANAX) 1 MG tablet    Benzodiazepine dependence, continuous (Chronic)    Overview     Chronic.  Stable.   reviewed.  On long-term Xanax as needed for panic attacks and anxiety          Current Assessment & Plan     Stable condition.  Refilling medication.         Encounter for long-term current use of medication    Overview     Jan 2025: reviewed labs.  June 2024:  Reviewed labs.  January 2024: Reviewed labs.  August 2023: Reviewed labs.  Initial HPI:  CHRONIC. Stable. Compliant with medications for managed conditions. See medication list. No SE reported. Routine lab analysis is being monitored. Refills were addressed.February 2021:CHRONIC. Stable. Compliant with medications for managed conditions. See medication list. No SE reported. Routine lab analysis is being monitored. Refills were addressed.  October 2021:  Reviewed labs.  May 2022:  Reviewed labs.  Lab Results   Component Value Date    WBC 4.68 06/24/2024    HGB 14.3 06/24/2024    HCT 43.7 06/24/2024    MCV 97 06/24/2024     06/24/2024         Chemistry        Component Value Date/Time     06/24/2024 0930    K 5.0 06/24/2024 0930     06/24/2024 0930    CO2 25 06/24/2024 0930    BUN 23 06/24/2024 0930    CREATININE 1.1 06/24/2024 0930    GLU 91 06/24/2024 0930        Component Value Date/Time    CALCIUM 9.7 06/24/2024 0930    ALKPHOS 78 06/24/2024 0930    AST 21 06/24/2024 0930    ALT 25 06/24/2024 0930    BILITOT 0.6 06/24/2024 0930    ESTGFRAFRICA >60.0 05/27/2022 1129    EGFRNONAA >60.0 05/27/2022 1129        Lab Results   Component Value Date    TSH 2.595 06/24/2024    H3RXIDE 6.5 06/30/2011            Current Assessment & Plan     Update labs. Complete history and physical was completed today.  Complete and thorough medication reconciliation was performed.  Discussed risks and benefits of medications.  Advised patient on orders and health maintenance.  We discussed  old records and old labs if available.  Will request any records not available through epic.  Continue current medications listed on your summary sheet.         Relevant Orders    CBC Without Differential    Comprehensive Metabolic Panel    Hemoglobin A1C    Lipid Panel    TSH    TESTOSTERONE       Pulmonary    Chronic obstructive pulmonary disease    Overview     Chronic. Recently started on Breo. Uses albuterol PRN. Followed by pulmonology. He does have intermittent wheezing and shortness of breath. Plans for additional pulmonary testing soon (PFTs, 6 min walk, imaging, etc).          Current Assessment & Plan     Continue current medications and plan of care per pulmonology. Follow up with specialist.             Cardiac/Vascular    Abnormal ECG    Overview     Patient reports history of abnormal EKG and stents. He is requesting to have EKG rechecked. Denies chest pain, palpitations, SOB, syncope.     EKG 12-lead  Test Reason : Q24.9,    Vent. Rate : 069 BPM     Atrial Rate : 069 BPM     P-R Int : 178 ms          QRS Dur : 136 ms      QT Int : 378 ms       P-R-T Axes : 059 -48 059 degrees     QTc Int : 405 ms    Normal sinus rhythm  Right bundle branch block  Left anterior fascicular block   Bifascicular block  Minimal voltage criteria for LVH, may be normal variant  Septal infarct (cited on or before 12-JAN-2024)  Abnormal ECG  When compared with ECG of 07-DEC-2023 13:40,  Previous ECG has undetermined rhythm, needs review  Confirmed by Kylee Patel MD ([id number]) on 1/13/2024 10:22:59 AM    Referred By: MORRIS WALTERS           Confirmed By:Kylee Patel MD   Specimen Collected: 01/12/24 14:55 CST            Current Assessment & Plan     VV today. EKG ordered. Recommend patient follow up with cardiology.          Relevant Orders    IN OFFICE EKG 12-LEAD (to Muse)    Status post coronary artery stent placement    Overview     Patient with recent stent placed. He is no longer taking plavix. He is taking  "ASA.     Cardiac catheterization    The Prox RCA lesion was 90% stenosed with 0% stenosis   post-intervention.    The ejection fraction was calculated to be 60%.    The pre-procedure left ventricular end diastolic pressure was 23.    The post-procedure left ventricular end diastolic pressure was 25.    Prox RCA lesion: A .    Prox RCA lesion: A .    Prox RCA lesion: A stent was successfully placed at 13 NAYELI for 20 sec.    The estimated blood loss was none.    The PCI was successful.    The procedure log was documented by Documenter: Nicola Lizarraga RN and   verified by Dylan Johnson MD.    Date: 12/18/2023  Time: 6:35 PM             Current Assessment & Plan     Continue medications by Cardiology.  Follow-up with Cardiology.  ER precautions for severe symptoms.  Monitoring labs.  Controlling blood pressure.            Endocrine    Hypotestosteronemia (Chronic)    Overview     Chronic.  Patient reports that this condition is stable.  Managed by Urology.  Patient is using injections currently.    Lab Results   Component Value Date    WBC 4.68 06/24/2024    HGB 14.3 06/24/2024    HCT 43.7 06/24/2024    MCV 97 06/24/2024     06/24/2024     Testosterone, Free (pg/mL)   Date Value   10/12/2022 10.8     Testosterone, Total (ng/dL)   Date Value   06/24/2024 >1500 (H)     Testosterone, Bioavailable (ng/dL)   Date Value   10/16/2020 189.1     Reviewed previous Urology record:He has previously been seen by Dr. Luna.   He has been on testosterone replacement.  [facility].  He is on testosterone injection as well as Hcg Injections.  He injects 2 times per week.    He has no bothersome voiding complaints.  He has urinary frequency and takes Flomax but not always consistently.  His flow is ok.  He has low back pain "prostatitis" when he drinks too much caffeine.   Brother had prostate cancer.  Previous PSA have been in normal range.  Urine dipstick shows negative for all components.         Current Assessment & Plan     " Continue follow-up with Urology.  Check testosterone level.  Check CBC.          Follow up if symptoms worsen or fail to improve.  ER precautions for severe or worsening symptoms.     Ciara Winston, NP  _____________________________________________________________________________________________________________________________________________________    CC: med refills; follow up     HPI: Patient is a 74-year-old male who presents virtually today as an established patient here for follow up and medication refills. He is requesting refill on Xanax. Other chronic condition has been reviewed. Further details as stated above.     Past Medical History:  Past Medical History:   Diagnosis Date    Anxiety     BPH (benign prostatic hypertrophy)     DDD (degenerative disc disease), lumbar     Hypertension     GAURANG (obstructive sleep apnea)      Past Surgical History:   Procedure Laterality Date    JOINT REPLACEMENT      LEFT HEART CATHETERIZATION Left 12/18/2023    Procedure: Left heart cath;  Surgeon: Dylan Johnson MD;  Location: [facility] CATH LAB;  Service: Cardiology;  Laterality: Left;  Repeat K+ stat upon admit    PERCUTANEOUS CORONARY INTERVENTION, ARTERY N/A 12/18/2023    Procedure: Percutaneous coronary intervention;  Surgeon: Dylan Johnson MD;  Location: [facility] CATH LAB;  Service: Cardiology;  Laterality: N/A;    STENT, DRUG ELUTING, SINGLE VESSEL, CORONARY  12/18/2023    Procedure: Stent, Drug Eluting, Single Vessel, Coronary;  Surgeon: Dylan Johnson MD;  Location: [facility] CATH LAB;  Service: Cardiology;;     Review of patient's allergies indicates:   Allergen Reactions    No known drug allergies      Social History     Tobacco Use    Smoking status: Never    Smokeless tobacco: Never   Substance Use Topics    Alcohol use: Yes     Alcohol/week: 1.7 standard drinks of alcohol     Types: 2 Standard drinks or equivalent per week     Comment: socially    Drug use: No     Family History   Problem Relation Name  Age of Onset    Heart disease Mother      Cancer Mother      Heart disease Father      Prostate cancer Brother       Current Outpatient Medications on File Prior to Visit   Medication Sig Dispense Refill    albuterol (PROVENTIL/VENTOLIN HFA) 90 mcg/actuation inhaler Inhale 2 puffs into the lungs every 6 (six) hours as needed for Wheezing. 18 g 1    albuterol (PROVENTIL/VENTOLIN HFA) 90 mcg/actuation inhaler INHALE 2 PUFFS BY MOUTH EVERY 6 HOURS AS NEEDED FOR WHEEZE 54 g 2    aspirin 81 MG Chew Take 81 mg by mouth once daily.      buPROPion (WELLBUTRIN XL) 300 MG 24 hr tablet TAKE 1 TABLET BY MOUTH EVERY DAY 90 tablet 3    cholecalciferol, vitamin D3, (VITAMIN D3) 25 mcg (1,000 unit) capsule Take 1,000 Units by mouth.      co-enzyme Q-10 30 mg capsule Take 30 mg by mouth once daily.       cyanocobalamin (VITAMIN B-12) 100 MCG tablet Take 100 mcg by mouth.      DIINDOLYLMETHANE, BULK, MISC 100 mg by Other route.      docusate sodium (COLACE) 100 MG capsule Take 1 capsule (100 mg total) by mouth 2 (two) times daily as needed for Constipation. 60 capsule 0    dorzolamide-timolol, PF, (COSOPT PF) 2-0.5 % Dpet ophthalmic solution       famotidine (PEPCID) 40 MG tablet Take 1 tablet (40 mg total) by mouth once daily. 30 tablet 0    fish oil-omega-3 fatty acids 300-1,000 mg capsule Take 1 capsule by mouth once daily.       latanoprost 0.005 % ophthalmic solution Place 1 drop into both eyes nightly.      lisinopriL (PRINIVIL,ZESTRIL) 40 MG tablet take 1 tablet by mouth once DAILY 90 tablet 3    magnesium oxide (MAG-OX) 400 mg (241.3 mg magnesium) tablet Take 400 mg by mouth once daily.       meclizine (ANTIVERT) 25 mg tablet Take 1 tablet (25 mg total) by mouth 3 (three) times daily as needed for Dizziness. 30 tablet 0    terazosin (HYTRIN) 5 MG capsule Take 5 mg by mouth every evening.      testosterone cypionate (DEPOTESTOTERONE CYPIONATE) 100 mg/mL injection Inject into the muscle.      zinc gluconate 50 mg tablet Take 50  mg by mouth.      [DISCONTINUED] alfuzosin (UROXATRAL) 10 mg Tb24       [DISCONTINUED] ALPRAZolam (XANAX) 1 MG tablet Take 1 tablet (1 mg total) by mouth 3 (three) times daily. 90 tablet 5    [DISCONTINUED] clopidogreL (PLAVIX) 75 mg tablet Take 1 tablet (75 mg total) by mouth once daily. 30 tablet 11    [DISCONTINUED] diclofenac (VOLTAREN) 75 MG EC tablet Take 1 tablet (75 mg total) by mouth 2 (two) times daily. (Patient not taking: Reported on 6/21/2024) 28 tablet 0    [DISCONTINUED] terazosin (HYTRIN) 5 MG capsule Take by mouth. (Patient not taking: Reported on 8/19/2024)       Current Facility-Administered Medications on File Prior to Visit   Medication Dose Route Frequency Provider Last Rate Last Admin    sodium chloride 0.9% flush 10 mL  10 mL Intravenous PRN Dylan Johnson MD         Review of Systems   Constitutional:  Negative for activity change, appetite change, chills, fatigue, fever and unexpected weight change.   HENT:  Negative for congestion, hearing loss, rhinorrhea, sore throat and trouble swallowing.    Eyes:  Negative for discharge and visual disturbance.   Respiratory:  Positive for wheezing. Negative for cough, chest tightness and shortness of breath.    Cardiovascular:  Negative for chest pain, palpitations and leg swelling.   Gastrointestinal:  Negative for abdominal pain, blood in stool, constipation, diarrhea and vomiting.   Endocrine: Negative for polydipsia and polyuria.   Genitourinary:  Negative for difficulty urinating, dysuria, hematuria and urgency.   Musculoskeletal:  Negative for arthralgias, joint swelling, myalgias and neck pain.   Skin:  Negative for rash and wound.   Neurological:  Negative for dizziness, weakness and headaches.   Psychiatric/Behavioral:  Negative for behavioral problems, confusion and dysphoric mood. The patient is nervous/anxious.      There were no vitals filed for this visit.    Wt Readings from Last 3 Encounters:   08/19/24 98.8 kg (217 lb 14.4 oz)    06/21/24 99.4 kg (219 lb 1.6 oz)   02/14/24 100.7 kg (222 lb)     Physical Exam  Vitals reviewed.   Constitutional:       General: He is awake. He is not in acute distress.     Appearance: Normal appearance. He is not ill-appearing.   HENT:      Head: Normocephalic and atraumatic.      Right Ear: External ear normal.      Left Ear: External ear normal.      Nose: Nose normal.   Eyes:      Extraocular Movements: Extraocular movements intact.      Conjunctiva/sclera: Conjunctivae normal.   Pulmonary:      Effort: Pulmonary effort is normal. No respiratory distress.   Musculoskeletal:      Cervical back: Normal range of motion.   Skin:     Coloration: Skin is not pale.      Findings: No rash.   Neurological:      General: No focal deficit present.      Mental Status: He is alert and oriented to person, place, and time. Mental status is at baseline.      GCS: GCS eye subscore is 4. GCS verbal subscore is 5. GCS motor subscore is 6.   Psychiatric:         Attention and Perception: Attention normal. He is attentive.         Mood and Affect: Mood and affect normal. Mood is not anxious, depressed or elated. Affect is not labile, blunt, flat, angry, tearful or inappropriate.         Speech: Speech normal. He is communicative. Speech is not rapid and pressured, delayed or slurred.         Behavior: Behavior normal. Behavior is not agitated, slowed, aggressive, withdrawn or hyperactive. Behavior is cooperative.         Thought Content: Thought content normal. Thought content does not include homicidal or suicidal ideation.         Judgment: Judgment normal.       Health Maintenance   Topic Date Due    TETANUS VACCINE  Never done    High Dose Statin  Never done    Shingles Vaccine (1 of 2) Never done    RSV Vaccine (Age 60+ and Pregnant patients) (1 - Risk 60-74 years 1-dose series) Never done    Colorectal Cancer Screening  01/22/2023    Influenza Vaccine (1) 09/01/2024    COVID-19 Vaccine (4 - 2024-25 season) 09/01/2024     Aspirin/Antiplatelet Therapy  06/21/2025    PROSTATE-SPECIFIC ANTIGEN  06/24/2025    Lipid Panel  08/14/2025    Hepatitis C Screening  Completed    Pneumococcal Vaccines (Age 50+)  Completed     This note was generated with the assistance of ambient listening technology. Verbal consent was obtained by the patient and accompanying visitor(s) for the recording of patient appointment to facilitate this note. I attest to having reviewed and edited the generated note for accuracy, though some syntax or spelling errors may persist. Please contact the author of this note for any clarification.    Visit today included increased complexity associated with the care of the episodic problem - see above- addressed and managing the longitudinal care of the patient due to the serious and/or complex managed problem(s) - see above.

## 2025-01-09 NOTE — ASSESSMENT & PLAN NOTE
reviewed. Discussed medication risks. Refill Xanax for six months.  Follow-up in six months.  Discussed condition course and signs and symptoms to expect.  Patient advised of risk and benefits of medication use.  ER precautions.  Call MD or follow-up to clinic if not improving or worsening symptoms.    Please be advised of condition course.  SNRI/SSRI is first-line treatment for this condition.  Please be advised of the risk of discontinuing this medication without tapering/contacting MD.  Patient has been advised of side effects, and all questions were answered.  Patient voiced understanding.  Patient will follow up routinely and notify us if having any side effects or worsening or persistent symptoms.  ER precautions were given. Antidepressant/Antianxiety Medication Initiation:  Patient informed of risks, benefits, and potential side effects of medication and accepts informed consent.  Common side effects include nausea, fatigue, headache, insomnia, etc see medication insert for complete side effect profile.  Most importantly be advised of the possibility of new or worsening suicidal thoughts/depression/anxiety etcetera.  Please be advised to stop the medication immediately and seek urgent treatment if this occurs.  Therefore please do not to abruptly discontinue medication without physician guidance except in cases of sudden onset or worsening of SI.

## 2025-01-14 ENCOUNTER — CLINICAL SUPPORT (OUTPATIENT)
Dept: FAMILY MEDICINE | Facility: CLINIC | Age: 75
End: 2025-01-14
Payer: MEDICARE

## 2025-01-14 ENCOUNTER — LAB VISIT (OUTPATIENT)
Dept: LAB | Facility: HOSPITAL | Age: 75
End: 2025-01-14
Attending: NURSE PRACTITIONER
Payer: MEDICARE

## 2025-01-14 DIAGNOSIS — Z79.899 ENCOUNTER FOR LONG-TERM CURRENT USE OF MEDICATION: ICD-10-CM

## 2025-01-14 DIAGNOSIS — R94.31 ABNORMAL ECG: Primary | ICD-10-CM

## 2025-01-14 LAB
ALBUMIN SERPL BCP-MCNC: 4.2 G/DL (ref 3.5–5.2)
ALP SERPL-CCNC: 93 U/L (ref 40–150)
ALT SERPL W/O P-5'-P-CCNC: 28 U/L (ref 10–44)
ANION GAP SERPL CALC-SCNC: 7 MMOL/L (ref 8–16)
AST SERPL-CCNC: 26 U/L (ref 10–40)
BILIRUB SERPL-MCNC: 0.9 MG/DL (ref 0.1–1)
BUN SERPL-MCNC: 31 MG/DL (ref 8–23)
CALCIUM SERPL-MCNC: 9.5 MG/DL (ref 8.7–10.5)
CHLORIDE SERPL-SCNC: 108 MMOL/L (ref 95–110)
CHOLEST SERPL-MCNC: 177 MG/DL (ref 120–199)
CHOLEST/HDLC SERPL: 3.2 {RATIO} (ref 2–5)
CO2 SERPL-SCNC: 25 MMOL/L (ref 23–29)
CREAT SERPL-MCNC: 1 MG/DL (ref 0.5–1.4)
ERYTHROCYTE [DISTWIDTH] IN BLOOD BY AUTOMATED COUNT: 13.5 % (ref 11.5–14.5)
EST. GFR  (NO RACE VARIABLE): >60 ML/MIN/1.73 M^2
ESTIMATED AVG GLUCOSE: 105 MG/DL (ref 68–131)
GLUCOSE SERPL-MCNC: 95 MG/DL (ref 70–110)
HBA1C MFR BLD: 5.3 % (ref 4–5.6)
HCT VFR BLD AUTO: 42 % (ref 40–54)
HDLC SERPL-MCNC: 56 MG/DL (ref 40–75)
HDLC SERPL: 31.6 % (ref 20–50)
HGB BLD-MCNC: 14.1 G/DL (ref 14–18)
LDLC SERPL CALC-MCNC: 106.4 MG/DL (ref 63–159)
MCH RBC QN AUTO: 31.7 PG (ref 27–31)
MCHC RBC AUTO-ENTMCNC: 33.6 G/DL (ref 32–36)
MCV RBC AUTO: 94 FL (ref 82–98)
NONHDLC SERPL-MCNC: 121 MG/DL
PLATELET # BLD AUTO: 207 K/UL (ref 150–450)
PMV BLD AUTO: 11.1 FL (ref 9.2–12.9)
POTASSIUM SERPL-SCNC: 4.6 MMOL/L (ref 3.5–5.1)
PROT SERPL-MCNC: 7.3 G/DL (ref 6–8.4)
RBC # BLD AUTO: 4.45 M/UL (ref 4.6–6.2)
SODIUM SERPL-SCNC: 140 MMOL/L (ref 136–145)
TESTOST SERPL-MCNC: 272 NG/DL (ref 304–1227)
TRIGL SERPL-MCNC: 73 MG/DL (ref 30–150)
TSH SERPL DL<=0.005 MIU/L-ACNC: 2.38 UIU/ML (ref 0.4–4)
WBC # BLD AUTO: 5.11 K/UL (ref 3.9–12.7)

## 2025-01-14 PROCEDURE — 83036 HEMOGLOBIN GLYCOSYLATED A1C: CPT | Performed by: NURSE PRACTITIONER

## 2025-01-14 PROCEDURE — 84403 ASSAY OF TOTAL TESTOSTERONE: CPT | Performed by: NURSE PRACTITIONER

## 2025-01-14 PROCEDURE — 85027 COMPLETE CBC AUTOMATED: CPT | Performed by: NURSE PRACTITIONER

## 2025-01-14 PROCEDURE — 80061 LIPID PANEL: CPT | Performed by: NURSE PRACTITIONER

## 2025-01-14 PROCEDURE — 84443 ASSAY THYROID STIM HORMONE: CPT | Performed by: NURSE PRACTITIONER

## 2025-01-14 PROCEDURE — 80053 COMPREHEN METABOLIC PANEL: CPT | Performed by: NURSE PRACTITIONER

## 2025-01-14 PROCEDURE — 99999 PR PBB SHADOW E&M-EST. PATIENT-LVL III: CPT | Mod: PBBFAC,,,

## 2025-01-16 ENCOUNTER — TELEPHONE (OUTPATIENT)
Dept: FAMILY MEDICINE | Facility: CLINIC | Age: 75
End: 2025-01-16
Payer: MEDICARE

## 2025-01-16 NOTE — TELEPHONE ENCOUNTER
----- Message from Nathalie sent at 1/16/2025  3:48 PM CST -----  Contact: Patient, [phone]  Patient is returning a phone call.    Who left a message for the patient: Power    Does patient know what this is regarding:  Lab results    Would you like a call back, or a response through your [facility] portal?:   Call back    Comments: Missed your call, please call him back. Thanks.

## 2025-01-17 ENCOUNTER — TELEPHONE (OUTPATIENT)
Dept: FAMILY MEDICINE | Facility: CLINIC | Age: 75
End: 2025-01-17
Payer: MEDICARE

## 2025-01-17 ENCOUNTER — E-VISIT (OUTPATIENT)
Dept: FAMILY MEDICINE | Facility: CLINIC | Age: 75
End: 2025-01-17
Payer: MEDICARE

## 2025-01-17 DIAGNOSIS — R68.89 FLU-LIKE SYMPTOMS: Primary | ICD-10-CM

## 2025-01-17 DIAGNOSIS — R05.1 ACUTE COUGH: ICD-10-CM

## 2025-01-17 PROCEDURE — 99421 OL DIG E/M SVC 5-10 MIN: CPT | Mod: ,,, | Performed by: NURSE PRACTITIONER

## 2025-01-17 RX ORDER — PROMETHAZINE HYDROCHLORIDE AND DEXTROMETHORPHAN HYDROBROMIDE 6.25; 15 MG/5ML; MG/5ML
5 SYRUP ORAL EVERY 6 HOURS PRN
Qty: 118 ML | Refills: 0 | Status: SHIPPED | OUTPATIENT
Start: 2025-01-17 | End: 2025-01-27

## 2025-01-17 RX ORDER — OSELTAMIVIR PHOSPHATE 75 MG/1
75 CAPSULE ORAL 2 TIMES DAILY
Qty: 10 CAPSULE | Refills: 0 | Status: SHIPPED | OUTPATIENT
Start: 2025-01-17 | End: 2025-01-22

## 2025-01-17 NOTE — PROGRESS NOTES
Patient ID: Andrés Casillas is a 74 y.o. male.    Chief Complaint: URI (Entered automatically based on patient selection in [employer].)    The patient initiated a request through [employer] on 1/17/2025 for evaluation and management with a chief complaint of URI (Entered automatically based on patient selection in [employer].)     I evaluated the questionnaire submission on 1/17/2025.    [facility] E-Visit Covid    1/17/2025  2:04 PM CST - Filed by Patient   Do you agree to participate in an E-Visit? Yes   If you have any of the following symptoms, go to your local emergency room or call 911: I acknowledge   Choose the state of your primary residence [place]   What is the main issue you would like addressed today? Respiratory issiue   Do you think you might have COVID or the Flu? Yes Flu   Have you tested positive for COVID or Flu? No   What symptoms do you currently have?  Cough   Describe your cough: Productive (containing mucus);  Bothersome (interferes with daily activities)   Describe the mucus: Green;  Clear;  Thick   Have you ever smoked? I have never smoked   Have you had a fever? No   When did your symptoms first appear? 1/15/2025   In the last two weeks, have you been in close contact with someone who has COVID-19 or the Flu? Yes, Flu   List what you have done or taken to help your symptoms. Asthma inhaler   How severe are your symptoms? Severe   Have your symptoms gotten better or worse since they started?  Worse   Do you have transportation to get testing if it is needed and ordered for you at an [facility] location? Yes   Provide any additional information you feel is important. Cough is caused by wheezing and crackling of respiratory system. Cant take a deep breath without coughing.   Please attach any relevant images or files    Are you able to take your vital signs? Yes   Systolic Blood Pressure: 162   Diastolic Blood Pressure: 92   Weight: 225   Height: 6   Pulse: 72   Temperature:    Respiration rate:     Pulse Oxygen:           Active Problem List with Overview Notes    Diagnosis Date Noted    Chronic obstructive pulmonary disease 01/09/2025     Chronic. Recently started on Breo. Uses albuterol PRN. Followed by pulmonology. He does have intermittent wheezing and shortness of breath. Plans for additional pulmonary testing soon (PFTs, 6 min walk, imaging, etc).       Coronary artery disease involving native coronary artery of native heart without angina pectoris 02/14/2024    Status post coronary artery stent placement 01/05/2024     Patient with recent stent placed. He is no longer taking plavix. He is taking ASA.     Cardiac catheterization    The Prox RCA lesion was 90% stenosed with 0% stenosis   post-intervention.    The ejection fraction was calculated to be 60%.    The pre-procedure left ventricular end diastolic pressure was 23.    The post-procedure left ventricular end diastolic pressure was 25.    Prox RCA lesion: A .    Prox RCA lesion: A .    Prox RCA lesion: A stent was successfully placed at 13 NAYELI for 20 sec.    The estimated blood loss was none.    The PCI was successful.    The procedure log was documented by Documenter: Nicola Lizarraga RN and   verified by Dylan Johnson MD.    Date: 12/18/2023  Time: 6:35 PM          Plantar fasciitis 01/05/2024     New problem.  Patient having issue with heel pain.  He is unable to exercise because of this condition.      Obesity (BMI 30.0-34.9) 12/12/2023    Restrictive lung disease secondary to obesity 12/12/2023    RBBB (right bundle branch block with left anterior fascicular block) 11/07/2023    DDD (degenerative disc disease), lumbar 08/22/2023     REASON FOR EXAM: [R97.20]-Elevated prostate specific antigen (PSA) / [Z12.5]-Encounter for screening for malignant neoplasm of prostate / Enlarged prostate     TECHNICAL FACTORS: Multiplanar multisequence MRI of the pelvis with attention to the prostate precontrast.  Dynamic postcontrast axial T1 images were  U[employer]alli and Mee[employer]ius software was used for post processing and analysis. ProFuse image mapping was not   performed.     DOSE: 20  mL ProHance IV     ADDITIONAL CLINICAL INFORMATION:     PSA:   3.0 ng/mL June 2023, 2.2 ng/mL October 2022, 3.4 ng/mL May 2022       Prostate biopsy:   None available     COMPARISON: None     FINDINGS:   Prostate size: 43  cc   Intravesical prostate protrusion: None   Post biopsy or other hemorrhage: None       PERIPHERAL ZONE:   Index lesion(s) location and size: No index lesion     T2W appearance: Heterogeneous T2 signal in the peripheral zone, particularly at the mid gland level.     DWI appearance: No abnormal restricted diffusion     Dynamic Contrast: No early or pathological enhancement     Composite PI-RADS: 2       TRANSITION ZONE:   Index lesion(s) location and size: No index lesion     T2W appearance: Mild T2 heterogeneity and scattered BPH nodules   DWI appearance: No abnormal restricted diffusion   Dynamic Contrast: No early or pathological enhancement   Composite PI-RADS: 2     ADDITIONAL FINDINGS:   Prostate: None   Seminal vesicles: None   Lymphadenopathy: No significant lymphadenopathy   Osseous: Degenerative changes of the lower lumbar spine and sacroiliac joints. Nonspecific T1 low signal 2.5 cm lesion in the right femoral head. This could indicate a sclerotic bone island.   Abdomen/Pelvis: Fat-containing bilateral inguinal hernias. Fairly extensive colonic diverticulosis.     IMPRESSION:    1.  PIRADS 2 - Low (clinically significant cancer is unlikely to be present).    2.  Changes of mild BPH.   3. Fairly extensive distal colonic diverticulosis.       PIRADS ASSESSMENT CATEGORIES:   PIRADS 1-Very Low (clinically significant cancer is highly unlikely)   PIRADS 2-Low (clinically significant cancer is unlikely)   PIRADS 3-Intermediate (the presence of clinically significant cancer is equivocal)   PIRADS 4-High (clinically significant cancer is likely)   PIRADS 5-Very  High (clinically significant cancer is highly likely)     Electronically signed by Piotr Mann MD on 8/1/2023 1:49 PM        Bilateral inguinal hernia without obstruction or gangrene 08/22/2023     Seen on recent imaging of MRI.  Patient not having any issues with this condition currently.      Bone lesion 08/22/2023     REASON FOR EXAM: [R97.20]-Elevated prostate specific antigen (PSA) / [Z12.5]-Encounter for screening for malignant neoplasm of prostate / Enlarged prostate     TECHNICAL FACTORS: Multiplanar multisequence MRI of the pelvis with attention to the prostate precontrast.  Dynamic postcontrast axial T1 images were obtained and [employer] software was used for post processing and analysis. ProFuse image mapping was not   performed.     DOSE: 20  mL ProHance IV     ADDITIONAL CLINICAL INFORMATION:     PSA:   3.0 ng/mL June 2023, 2.2 ng/mL October 2022, 3.4 ng/mL May 2022       Prostate biopsy:   None available     COMPARISON: None     FINDINGS:   Prostate size: 43  cc   Intravesical prostate protrusion: None   Post biopsy or other hemorrhage: None       PERIPHERAL ZONE:   Index lesion(s) location and size: No index lesion     T2W appearance: Heterogeneous T2 signal in the peripheral zone, particularly at the mid gland level.     DWI appearance: No abnormal restricted diffusion     Dynamic Contrast: No early or pathological enhancement     Composite PI-RADS: 2       TRANSITION ZONE:   Index lesion(s) location and size: No index lesion     T2W appearance: Mild T2 heterogeneity and scattered BPH nodules   DWI appearance: No abnormal restricted diffusion   Dynamic Contrast: No early or pathological enhancement   Composite PI-RADS: 2     ADDITIONAL FINDINGS:   Prostate: None   Seminal vesicles: None   Lymphadenopathy: No significant lymphadenopathy   Osseous: Degenerative changes of the lower lumbar spine and sacroiliac joints. Nonspecific T1 low signal 2.5 cm lesion in the right femoral head. This could  "indicate a sclerotic bone island.   Abdomen/Pelvis: Fat-containing bilateral inguinal hernias. Fairly extensive colonic diverticulosis.     IMPRESSION:    1.  PIRADS 2 - Low (clinically significant cancer is unlikely to be present).    2.  Changes of mild BPH.   3. Fairly extensive distal colonic diverticulosis.       PIRADS ASSESSMENT CATEGORIES:   PIRADS 1-Very Low (clinically significant cancer is highly unlikely)   PIRADS 2-Low (clinically significant cancer is unlikely)   PIRADS 3-Intermediate (the presence of clinically significant cancer is equivocal)   PIRADS 4-High (clinically significant cancer is likely)   PIRADS 5-Very High (clinically significant cancer is highly likely)     Electronically signed by Piotr Mann MD on 8/1/2023 1:49 PM        Bone island of right femur 08/22/2023    Decreased energy 06/19/2023    Cervicalgia 04/03/2023     Chronic problem. Reports onset "years ago". Intermittently flares. Worse to left side and radiates down left shoulder blade. Associated with occasional numbness down LUE. There has been no recent injuries or known trauma. He has had recurrent self limited episodes of neck pain in the past.  Reports no previous evaluation. He states he has tried physical therapy in the past. He has seen a chiropractor. He has tried OTC NSAIDs with no improvement.       Constipation 04/03/2023     New problem. Worsening over the last few weeks. Bowel movements occur nearly every day. However, BM is difficult. Current Health Habits: Eating fiber? Yes. He is taking OTC fiber supplement. Exercise? Occasional. Water intake? Minimal. Patient admits to not drinking enough water. Last colonoscopy 01/2020, poor prep. Discussed. Patient plans to follow up with Dr. Richards.       Elevated prostate specific antigen (PSA) 10/10/2022    Low testosterone 10/10/2022    Need for hepatitis C screening test 05/27/2022     Patient is due for hepatitis C screening.   Patient advised of risk of soraya " hepatitis C including being Born between 1945 and 1965, blood transfusions prior to 1992, IV or nasal drug use, tattoos, sexual intercourse.  Patient would like to be tested.  Patient reports possible exposure to hepatitis-C through sexual transmission.    Lab Results   Component Value Date    HEPAIGM Negative 05/21/2014    HEPBIGM Negative 05/21/2014    HEPBCAB Negative 01/23/2009    HEPCAB Negative 05/21/2014           Vitamin D deficiency 10/22/2021     Chronic. Vit d deficiency. Takes vitamin d supplement. No SE reported. Fatigue is slightly improved.   Lab Results   Component Value Date    [id number] 84 10/16/2020            Vitamin B12 deficiency 10/22/2021     Chronic.  Stable.  Lab Results   Component Value Date    HCUTZMIW43 1081 (H) 10/16/2020           Ganglion cyst of wrist, right 10/16/2020     Chronic.  Recurrent.  Patient reports that the cyst on his right wrist was swollen few weeks ago and was painful.  Currently it has gone back down and is not bothering him but is still present.  Patient does not want anything done with it today but just wanted noted.  Just in case he needs a referral.      Palpitation 10/16/2020     Subacute.  Patient states that he has been feeling and abnormal twitching sensation under his left breast/chest.  Patient denies any chest pain but does have an abnormal sensation.  Patient is concerned that it may be a hiatal hernia?  He has had reflux in the past but reports this sensation is different.  Patient does exercise regularly and lifts weights.  Patient reports that sometimes it is tender to palpation.    Results for orders placed or performed in visit on 10/16/20   IN OFFICE EKG 12-LEAD (to Guys)    Collection Time: 10/16/20 10:44 AM    Narrative    Test Reason :     Vent. Rate : 066 BPM     Atrial Rate : 066 BPM     P-R Int : 172 ms          QRS Dur : 146 ms      QT Int : 380 ms       P-R-T Axes : 038 -57 030 degrees     QTc Int : 398 ms    Normal sinus rhythm  Right  bundle branch block  Left anterior fascicular block   Bifascicular block   Moderate voltage criteria for LVH, may be normal variant  Cannot rule out Septal infarct ,age undetermined  Abnormal ECG  When compared with ECG of 01-DEC-2011 12:57,  Previous ECG has undetermined rhythm, needs review  (RBBB and left anterior fascicular block) is now Present  Minimal criteria for Septal infarct are now Present    Referred By: DIOGENES WALDRON           Confirmed By:            Epigastric pain 10/16/2020     See palpitation problem.      Abnormal ECG 10/16/2020     Patient reports history of abnormal EKG and stents. He is requesting to have EKG rechecked. Denies chest pain, palpitations, SOB, syncope.     EKG 12-lead  Test Reason : Q24.9,    Vent. Rate : 069 BPM     Atrial Rate : 069 BPM     P-R Int : 178 ms          QRS Dur : 136 ms      QT Int : 378 ms       P-R-T Axes : 059 -48 059 degrees     QTc Int : 405 ms    Normal sinus rhythm  Right bundle branch block  Left anterior fascicular block   Bifascicular block  Minimal voltage criteria for LVH, may be normal variant  Septal infarct (cited on or before 12-JAN-2024)  Abnormal ECG  When compared with ECG of 07-DEC-2023 13:40,  Previous ECG has undetermined rhythm, needs review  Confirmed by Kylee Patel MD ([id number]) on 1/13/2024 10:22:59 AM    Referred By: MORRIS WALTERS           Confirmed By:Kylee Patel MD   Specimen Collected: 01/12/24 14:55 CST         Benzodiazepine dependence, continuous 07/15/2020     Chronic.  Stable.   reviewed.  On long-term Xanax as needed for panic attacks and anxiety       Anxiety 07/13/2020 Jan 2025: no changes in history. Remains on Xanax PRN as prescribed. Symptoms are stable. He has been on this medication for a few years. No SE reported.     June 2024:  Patient needing medication refills.  January 2024:  Patient reports compliance with medications.  No side effects reported.  Symptoms are controlled.  Patient has increased  "anxiety about his health.  Recently had stent placed in his heart.    Chronic.  August 2023: Patient here for medication refill.  Reports compliance.  No side effects reported.  Symptoms are controlled.  Stable.  Patient on chronic benzodiazepine from previous PCP.  Transitioning care to me.The patient was checked in the [facility] [employer]acy's  Prescription Monitoring Program. There appears to be no incongruities with the patient's verbalized history.   No abuse suspected.  February 2021:  Patient had PVCs on cardiac workup.  Patient reports cardiology has adjusted medications that he is doing much better.  Blood pressure has been well controlled.  May 2022:  Patient is here for medication refills.  He continues to have symptoms that are intermittently controlled with medication.      Hypotestosteronemia 07/13/2020     Chronic.  Patient reports that this condition is stable.  Managed by Urology.  Patient is using injections currently.    Lab Results   Component Value Date    WBC 4.68 06/24/2024    HGB 14.3 06/24/2024    HCT 43.7 06/24/2024    MCV 97 06/24/2024     06/24/2024     Testosterone, Free (pg/mL)   Date Value   10/12/2022 10.8     Testosterone, Total (ng/dL)   Date Value   06/24/2024 >1500 (H)     Testosterone, Bioavailable (ng/dL)   Date Value   10/16/2020 189.1     Reviewed previous Urology record:He has previously been seen by Dr. Luna.   He has been on testosterone replacement.  [facility].  He is on testosterone injection as well as Hcg Injections.  He injects 2 times per week.    He has no bothersome voiding complaints.  He has urinary frequency and takes Flomax but not always consistently.  His flow is ok.  He has low back pain "prostatitis" when he drinks too much caffeine.   Brother had prostate cancer.  Previous PSA have been in normal range.  Urine dipstick shows negative for all components.      Encounter for long-term current use of medication 07/13/2020 Jan 2025: " reviewed labs.  June 2024:  Reviewed labs.  January 2024: Reviewed labs.  August 2023: Reviewed labs.  Initial HPI:  CHRONIC. Stable. Compliant with medications for managed conditions. See medication list. No SE reported. Routine lab analysis is being monitored. Refills were addressed.February 2021:CHRONIC. Stable. Compliant with medications for managed conditions. See medication list. No SE reported. Routine lab analysis is being monitored. Refills were addressed.  October 2021:  Reviewed labs.  May 2022:  Reviewed labs.  Lab Results   Component Value Date    WBC 4.68 06/24/2024    HGB 14.3 06/24/2024    HCT 43.7 06/24/2024    MCV 97 06/24/2024     06/24/2024         Chemistry        Component Value Date/Time     06/24/2024 0930    K 5.0 06/24/2024 0930     06/24/2024 0930    CO2 25 06/24/2024 0930    BUN 23 06/24/2024 0930    CREATININE 1.1 06/24/2024 0930    GLU 91 06/24/2024 0930        Component Value Date/Time    CALCIUM 9.7 06/24/2024 0930    ALKPHOS 78 06/24/2024 0930    AST 21 06/24/2024 0930    ALT 25 06/24/2024 0930    BILITOT 0.6 06/24/2024 0930    ESTGFRAFRICA >60.0 05/27/2022 1129    EGFRNONAA >60.0 05/27/2022 1129        Lab Results   Component Value Date    TSH 2.595 06/24/2024    J5HPMTW 6.5 06/30/2011         Osteoarthritis of right knee 06/19/2019    Essential hypertension      CHRONIC. STABLE. BP Reviewed.  Compliant with BP medications. No SE reported.  May 2022:  Blood pressure well controlled on current regimen including amlodipine.  July 2020:  Compliant with lisinopril 20 mg daily.  No cough.  (-) CP, SOB, palpitations, dizziness, lightheadedness, HA, arm numbness, tingling or weakness, syncope.  Creatinine   Date Value Ref Range Status   07/14/2020 1.0 0.5 - 1.4 mg/dL Final           Benign prostatic hyperplasia with lower urinary tract symptoms      Chronic.  Intermittent control.  Patient following with urology.      REASON FOR EXAM: [R97.20]-Elevated prostate specific  antigen (PSA) / [Z12.5]-Encounter for screening for malignant neoplasm of prostate / Enlarged prostate     TECHNICAL FACTORS: Multiplanar multisequence MRI of the pelvis with attention to the prostate precontrast.  Dynamic postcontrast axial T1 images were obtained and [employer] software was used for post processing and analysis. ProFuse image mapping was not   performed.     DOSE: 20  mL ProHance IV     ADDITIONAL CLINICAL INFORMATION:     PSA:   3.0 ng/mL June 2023, 2.2 ng/mL October 2022, 3.4 ng/mL May 2022       Prostate biopsy:   None available     COMPARISON: None     FINDINGS:   Prostate size: 43  cc   Intravesical prostate protrusion: None   Post biopsy or other hemorrhage: None       PERIPHERAL ZONE:   Index lesion(s) location and size: No index lesion     T2W appearance: Heterogeneous T2 signal in the peripheral zone, particularly at the mid gland level.     DWI appearance: No abnormal restricted diffusion     Dynamic Contrast: No early or pathological enhancement     Composite PI-RADS: 2       TRANSITION ZONE:   Index lesion(s) location and size: No index lesion     T2W appearance: Mild T2 heterogeneity and scattered BPH nodules   DWI appearance: No abnormal restricted diffusion   Dynamic Contrast: No early or pathological enhancement   Composite PI-RADS: 2     ADDITIONAL FINDINGS:   Prostate: None   Seminal vesicles: None   Lymphadenopathy: No significant lymphadenopathy   Osseous: Degenerative changes of the lower lumbar spine and sacroiliac joints. Nonspecific T1 low signal 2.5 cm lesion in the right femoral head. This could indicate a sclerotic bone island.   Abdomen/Pelvis: Fat-containing bilateral inguinal hernias. Fairly extensive colonic diverticulosis.     IMPRESSION:    1.  PIRADS 2 - Low (clinically significant cancer is unlikely to be present).    2.  Changes of mild BPH.   3. Fairly extensive distal colonic diverticulosis.       PIRADS ASSESSMENT CATEGORIES:   PIRADS 1-Very Low (clinically  significant cancer is highly unlikely)   PIRADS 2-Low (clinically significant cancer is unlikely)   PIRADS 3-Intermediate (the presence of clinically significant cancer is equivocal)   PIRADS 4-High (clinically significant cancer is likely)   PIRADS 5-Very High (clinically significant cancer is highly likely)     Electronically signed by Piotr Mann MD on 8/1/2023 1:49 PM        GAURANG (obstructive sleep apnea)      8/15/2018: AHI 17.2 , min sat 89%        Recent Labs Obtained:  Lab Visit on 01/14/2025   Component Date Value Ref Range Status    WBC 01/14/2025 5.11  3.90 - 12.70 K/uL Final    RBC 01/14/2025 4.45 (L)  4.60 - 6.20 M/uL Final    Hemoglobin 01/14/2025 14.1  14.0 - 18.0 g/dL Final    Hematocrit 01/14/2025 42.0  40.0 - 54.0 % Final    MCV 01/14/2025 94  82 - 98 fL Final    MCH 01/14/2025 31.7 (H)  27.0 - 31.0 pg Final    MCHC 01/14/2025 33.6  32.0 - 36.0 g/dL Final    RDW 01/14/2025 13.5  11.5 - 14.5 % Final    Platelets 01/14/2025 207  150 - 450 K/uL Final    MPV 01/14/2025 11.1  9.2 - 12.9 fL Final    Sodium 01/14/2025 140  136 - 145 mmol/L Final    Potassium 01/14/2025 4.6  3.5 - 5.1 mmol/L Final    Chloride 01/14/2025 108  95 - 110 mmol/L Final    CO2 01/14/2025 25  23 - 29 mmol/L Final    Glucose 01/14/2025 95  70 - 110 mg/dL Final    BUN 01/14/2025 31 (H)  8 - 23 mg/dL Final    Creatinine 01/14/2025 1.0  0.5 - 1.4 mg/dL Final    Calcium 01/14/2025 9.5  8.7 - 10.5 mg/dL Final    Total Protein 01/14/2025 7.3  6.0 - 8.4 g/dL Final    Albumin 01/14/2025 4.2  3.5 - 5.2 g/dL Final    Total Bilirubin 01/14/2025 0.9  0.1 - 1.0 mg/dL Final    Comment: For infants and newborns, interpretation of results should be based  on gestational age, weight and in agreement with clinical  observations.    Premature Infant recommended reference ranges:  Up to 24 hours.............<8.0 mg/dL  Up to 48 hours............<12.0 mg/dL  3-5 days..................<15.0 mg/dL  6-29 days.................<15.0 mg/dL      Alkaline  Phosphatase 01/14/2025 93  40 - 150 U/L Final    AST 01/14/2025 26  10 - 40 U/L Final    ALT 01/14/2025 28  10 - 44 U/L Final    eGFR 01/14/2025 >60.0  >60 mL/min/1.73 m^2 Final    Anion Gap 01/14/2025 7 (L)  8 - 16 mmol/L Final    Hemoglobin A1C 01/14/2025 5.3  4.0 - 5.6 % Final    Comment: ADA Screening Guidelines:  5.7-6.4%  Consistent with prediabetes  >or=6.5%  Consistent with diabetes    High levels of fetal hemoglobin interfere with the HbA1C  assay. Heterozygous hemoglobin variants (HbS, HgC, etc)do  not significantly interfere with this assay.   However, presence of multiple variants may affect accuracy.      Estimated Avg Glucose 01/14/2025 105  68 - 131 mg/dL Final    Cholesterol 01/14/2025 177  120 - 199 mg/dL Final    Comment: The National Cholesterol Education Program (NCEP) has set the  following guidelines (reference ranges) for Cholesterol:  Optimal.....................<200 mg/dL  Borderline High.............200-239 mg/dL  High........................> or = 240 mg/dL      Triglycerides 01/14/2025 73  30 - 150 mg/dL Final    Comment: The National Cholesterol Education Program (NCEP) has set the  following guidelines (reference values) for triglycerides:  Normal......................<150 mg/dL  Borderline High.............150-199 mg/dL  High........................200-499 mg/dL      HDL 01/14/2025 56  40 - 75 mg/dL Final    Comment: The National Cholesterol Education Program (NCEP) has set the  following guidelines (reference values) for HDL Cholesterol:  Low...............<40 mg/dL  Optimal...........>60 mg/dL      LDL Cholesterol 01/14/2025 106.4  63.0 - 159.0 mg/dL Final    Comment: The National Cholesterol Education Program (NCEP) has set the  following guidelines (reference values) for LDL Cholesterol:  Optimal.......................<130 mg/dL  Borderline High...............130-159 mg/dL  High..........................160-189 mg/dL  Very High.....................>190 mg/dL      HDL/Cholesterol  Ratio 2025 31.6  20.0 - 50.0 % Final    Total Cholesterol/HDL Ratio 2025 3.2  2.0 - 5.0 Final    Non-HDL Cholesterol 2025 121  mg/dL Final    Comment: Risk category and Non-HDL cholesterol goals:  Coronary heart disease (CHD)or equivalent (10-year risk of CHD >20%):  Non-HDL cholesterol goal     <130 mg/dL  Two or more CHD risk factors and 10-year risk of CHD <= 20%:  Non-HDL cholesterol goal     <160 mg/dL  0 to 1 CHD risk factor:  Non-HDL cholesterol goal     <190 mg/dL      TSH 2025 2.382  0.400 - 4.000 uIU/mL Final    Testosterone, Total 2025 272 (L)  304 - 1227 ng/dL Final       Encounter Diagnoses   Name Primary?    Flu-like symptoms Yes    Acute cough         No orders of the defined types were placed in this encounter.     Medications Ordered This Encounter   Medications    oseltamivir (TAMIFLU) 75 MG capsule     Sig: Take 1 capsule (75 mg total) by mouth 2 (two) times daily. for 5 days     Dispense:  10 capsule     Refill:  0    promethazine-dextromethorphan (PROMETHAZINE-DM) 6.25-15 mg/5 mL Syrp     Sig: Take 5 mLs by mouth every 6 (six) hours as needed (cough).     Dispense:  118 mL     Refill:  0        E-Visit Time Trackin minutes

## 2025-01-17 NOTE — TELEPHONE ENCOUNTER
----- Message from Leyda sent at 1/17/2025 12:48 PM CST -----  Contact: Andrés  .Patient is calling to speak with the nurse regarding questions and concerns  . Reports needing something for cough  . Please give patient a call back at [phone]

## 2025-02-04 ENCOUNTER — TELEPHONE (OUTPATIENT)
Dept: FAMILY MEDICINE | Facility: CLINIC | Age: 75
End: 2025-02-04

## 2025-02-04 NOTE — TELEPHONE ENCOUNTER
Spoke with pt in regard to scheduled VV this morning w/GLENNA Urbina. Pt was advised that his visit would need to be rescheduled due to logging on late. Pt was agreeable to rescheduling appt. Appt has been rescheduled to Friday, 01/07/2025 @ 9:40 am, VV. Pt verbalized understanding.

## 2025-02-04 NOTE — TELEPHONE ENCOUNTER
----- Message from Ciara Winston NP sent at 2/4/2025 11:15 AM CST -----  Contact: Andrés  See message below. This may be an old message before speaking to patient.  ----- Message -----  From: Sara López MA  Sent: 2/4/2025  10:25 AM CST  To: Ciara Winston NP      ----- Message -----  From: Leyda Skaggs  Sent: 2/4/2025  10:20 AM CST  To: [facility] Staff    .Patient is calling to speak with the nurse regarding appt  . Reports needing someone to call pt if possible . Please give patient a call back at [phone]

## 2025-02-07 ENCOUNTER — OFFICE VISIT (OUTPATIENT)
Dept: FAMILY MEDICINE | Facility: CLINIC | Age: 75
End: 2025-02-07
Payer: MEDICARE

## 2025-02-07 DIAGNOSIS — M70.22 OLECRANON BURSITIS OF LEFT ELBOW: Primary | ICD-10-CM

## 2025-02-07 DIAGNOSIS — M25.522 LEFT ELBOW PAIN: ICD-10-CM

## 2025-02-07 PROCEDURE — 1159F MED LIST DOCD IN RCRD: CPT | Mod: HCNC,CPTII,95, | Performed by: NURSE PRACTITIONER

## 2025-02-07 PROCEDURE — 98006 SYNCH AUDIO-VIDEO EST MOD 30: CPT | Mod: HCNC,95,, | Performed by: NURSE PRACTITIONER

## 2025-02-07 PROCEDURE — 1160F RVW MEDS BY RX/DR IN RCRD: CPT | Mod: HCNC,CPTII,95, | Performed by: NURSE PRACTITIONER

## 2025-02-07 PROCEDURE — 4010F ACE/ARB THERAPY RXD/TAKEN: CPT | Mod: HCNC,CPTII,95, | Performed by: NURSE PRACTITIONER

## 2025-02-07 PROCEDURE — 3044F HG A1C LEVEL LT 7.0%: CPT | Mod: HCNC,CPTII,95, | Performed by: NURSE PRACTITIONER

## 2025-02-07 RX ORDER — PREDNISONE 10 MG/1
10 TABLET ORAL 2 TIMES DAILY
Qty: 10 TABLET | Refills: 0 | Status: SHIPPED | OUTPATIENT
Start: 2025-02-07

## 2025-02-07 RX ORDER — DICLOFENAC SODIUM 75 MG/1
75 TABLET, DELAYED RELEASE ORAL 2 TIMES DAILY PRN
Qty: 30 TABLET | Refills: 0 | Status: SHIPPED | OUTPATIENT
Start: 2025-02-07

## 2025-02-07 NOTE — PROGRESS NOTES
Assessment/Plan:  Problem List Items Addressed This Visit    None  Visit Diagnoses       Olecranon bursitis of left elbow    -  Primary    Relevant Medications    predniSONE (DELTASONE) 10 MG tablet    diclofenac (VOLTAREN) 75 MG EC tablet    Other Relevant Orders    X-Ray Elbow Complete Left    Ambulatory referral/consult to Orthopedics    Left elbow pain        Relevant Medications    predniSONE (DELTASONE) 10 MG tablet    diclofenac (VOLTAREN) 75 MG EC tablet    Other Relevant Orders    X-Ray Elbow Complete Left    Ambulatory referral/consult to Orthopedics        Will obtain x-ray  Trial of prednisone and diclofenac as prescribed   Supportive care:  Rest: Avoid activities that aggravate the pain.   Ice: Apply ice packs to the affected area for 15-20 minutes at a time, several times a day.   Compression: Wrap an elastic bandage around the elbow to reduce swelling.   Elevation: Keep the elbow raised above the level of the heart.   If worsening or no improvement follow up with orthopedics   Follow up if symptoms worsen or fail to improve.  ER precautions for severe or worsening symptoms.     Ciara Winston NP  _____________________________________________________________________________________________________________________________________________________    CC: elbow pain     HPI: Patient is a 74-year-old male who presents virtually today as an established patient here for elbow pain. He sustained injury after falling from a riding  while attempting to water plants during a freeze. Injury occurred approx x2 weeks ago. Following impact, he experienced extensive bruising of the left elbow. Initial pain was severe the day following injury but has since improved. He currently reports fluid-filled swelling in the left elbow region. There is no redness, warmth, fever, chills, limitation in ROM. He has not tried anything for the symptoms.     Past Medical History:  Past Medical History:   Diagnosis Date     Anxiety     BPH (benign prostatic hypertrophy)     DDD (degenerative disc disease), lumbar     Hypertension     GAURANG (obstructive sleep apnea)      Past Surgical History:   Procedure Laterality Date    JOINT REPLACEMENT      LEFT HEART CATHETERIZATION Left 12/18/2023    Procedure: Left heart cath;  Surgeon: Dylan Johnson MD;  Location: [facility] ([place] CATH LAB;  Service: Cardiology;  Laterality: Left;  Repeat K+ stat upon admit    PERCUTANEOUS CORONARY INTERVENTION, ARTERY N/A 12/18/2023    Procedure: Percutaneous coronary intervention;  Surgeon: Dylan Johnson MD;  Location: [facility] CATH LAB;  Service: Cardiology;  Laterality: N/A;    STENT, DRUG ELUTING, SINGLE VESSEL, CORONARY  12/18/2023    Procedure: Stent, Drug Eluting, Single Vessel, Coronary;  Surgeon: Dylan Johnson MD;  Location: [facility] CATH LAB;  Service: Cardiology;;     Review of patient's allergies indicates:   Allergen Reactions    No known drug allergies      Social History     Tobacco Use    Smoking status: Never    Smokeless tobacco: Never   Substance Use Topics    Alcohol use: Yes     Alcohol/week: 1.7 standard drinks of alcohol     Types: 2 Standard drinks or equivalent per week     Comment: socially    Drug use: No     Family History   Problem Relation Name Age of Onset    Heart disease Mother      Cancer Mother      Heart disease Father      Prostate cancer Brother       Current Outpatient Medications on File Prior to Visit   Medication Sig Dispense Refill    albuterol (PROVENTIL/VENTOLIN HFA) 90 mcg/actuation inhaler Inhale 2 puffs into the lungs every 6 (six) hours as needed for Wheezing. 18 g 1    albuterol (PROVENTIL/VENTOLIN HFA) 90 mcg/actuation inhaler INHALE 2 PUFFS BY MOUTH EVERY 6 HOURS AS NEEDED FOR WHEEZE 54 g 2    ALPRAZolam (XANAX) 1 MG tablet Take 1 tablet (1 mg total) by mouth 3 (three) times daily. 90 tablet 5    aspirin 81 MG Chew Take 81 mg by mouth once daily.      buPROPion (WELLBUTRIN XL) 300 MG 24 hr tablet TAKE 1  TABLET BY MOUTH EVERY DAY 90 tablet 3    cholecalciferol, vitamin D3, (VITAMIN D3) 25 mcg (1,000 unit) capsule Take 1,000 Units by mouth.      co-enzyme Q-10 30 mg capsule Take 30 mg by mouth once daily.       cyanocobalamin (VITAMIN B-12) 100 MCG tablet Take 100 mcg by mouth.      DIINDOLYLMETHANE, BULK, MISC 100 mg by Other route.      docusate sodium (COLACE) 100 MG capsule Take 1 capsule (100 mg total) by mouth 2 (two) times daily as needed for Constipation. 60 capsule 0    dorzolamide-timolol, PF, (COSOPT PF) 2-0.5 % Dpet ophthalmic solution       famotidine (PEPCID) 40 MG tablet Take 1 tablet (40 mg total) by mouth once daily. 30 tablet 0    fish oil-omega-3 fatty acids 300-1,000 mg capsule Take 1 capsule by mouth once daily.       latanoprost 0.005 % ophthalmic solution Place 1 drop into both eyes nightly.      lisinopriL (PRINIVIL,ZESTRIL) 40 MG tablet take 1 tablet by mouth once DAILY 90 tablet 3    magnesium oxide (MAG-OX) 400 mg (241.3 mg magnesium) tablet Take 400 mg by mouth once daily.       meclizine (ANTIVERT) 25 mg tablet Take 1 tablet (25 mg total) by mouth 3 (three) times daily as needed for Dizziness. 30 tablet 0    terazosin (HYTRIN) 5 MG capsule Take 5 mg by mouth every evening.      testosterone cypionate (DEPOTESTOTERONE CYPIONATE) 100 mg/mL injection Inject into the muscle.      zinc gluconate 50 mg tablet Take 50 mg by mouth.       Current Facility-Administered Medications on File Prior to Visit   Medication Dose Route Frequency Provider Last Rate Last Admin    sodium chloride 0.9% flush 10 mL  10 mL Intravenous PRN Dylan Johnson MD         Review of Systems   Constitutional:  Negative for activity change, appetite change, chills, fatigue, fever and unexpected weight change.   HENT:  Negative for congestion, hearing loss, rhinorrhea, sore throat and trouble swallowing.    Eyes:  Negative for discharge and visual disturbance.   Respiratory:  Negative for cough, chest tightness,  shortness of breath and wheezing.    Cardiovascular:  Negative for chest pain, palpitations and leg swelling.   Gastrointestinal:  Negative for abdominal pain, blood in stool, constipation, diarrhea and vomiting.   Endocrine: Negative for polydipsia and polyuria.   Genitourinary:  Negative for difficulty urinating, dysuria, hematuria and urgency.   Musculoskeletal:  Positive for arthralgias and joint swelling. Negative for myalgias and neck pain.   Skin:  Negative for rash and wound.   Neurological:  Negative for dizziness, weakness and headaches.   Psychiatric/Behavioral:  Negative for behavioral problems, confusion and dysphoric mood. The patient is not nervous/anxious.      There were no vitals filed for this visit.    Wt Readings from Last 3 Encounters:   08/19/24 98.8 kg (217 lb 14.4 oz)   06/21/24 99.4 kg (219 lb 1.6 oz)   02/14/24 100.7 kg (222 lb)     Physical Exam  Vitals reviewed.   Constitutional:       General: He is awake. He is not in acute distress.     Appearance: Normal appearance. He is not ill-appearing.   HENT:      Head: Normocephalic and atraumatic.      Right Ear: External ear normal.      Left Ear: External ear normal.      Nose: Nose normal.   Eyes:      Extraocular Movements: Extraocular movements intact.      Conjunctiva/sclera: Conjunctivae normal.   Pulmonary:      Effort: Pulmonary effort is normal. No respiratory distress.   Musculoskeletal:      Right elbow: Normal.      Left elbow: Swelling present. Decreased range of motion.      Cervical back: Normal range of motion.      Comments: +fluid filled sac to left elbow. No redness on exam. Limited evaluation with virtual exam   Skin:     Coloration: Skin is not pale.      Findings: No rash.   Neurological:      General: No focal deficit present.      Mental Status: He is alert and oriented to person, place, and time. Mental status is at baseline.      GCS: GCS eye subscore is 4. GCS verbal subscore is 5. GCS motor subscore is 6.    Psychiatric:         Mood and Affect: Mood normal.         Speech: Speech normal. Speech is not rapid and pressured, delayed or slurred.         Behavior: Behavior normal. Behavior is not agitated, slowed, aggressive, withdrawn or hyperactive. Behavior is cooperative.         Thought Content: Thought content normal.         Judgment: Judgment normal.       Health Maintenance   Topic Date Due    TETANUS VACCINE  Never done    High Dose Statin  Never done    Shingles Vaccine (1 of 2) Never done    RSV Vaccine (Age 60+ and Pregnant patients) (1 - Risk 60-74 years 1-dose series) Never done    Colorectal Cancer Screening  01/22/2023    Influenza Vaccine (1) 09/01/2024    COVID-19 Vaccine (4 - 2024-25 season) 09/01/2024    Aspirin/Antiplatelet Therapy  06/21/2025    PROSTATE-SPECIFIC ANTIGEN  06/24/2025    Lipid Panel  01/14/2026    Hepatitis C Screening  Completed    Pneumococcal Vaccines (Age 50+)  Completed     This note was generated with the assistance of ambient listening technology. Verbal consent was obtained by the patient and accompanying visitor(s) for the recording of patient appointment to facilitate this note. I attest to having reviewed and edited the generated note for accuracy, though some syntax or spelling errors may persist. Please contact the author of this note for any clarification.

## 2025-02-07 NOTE — PROGRESS NOTES
X ray scheduled for next week. Pt did not want to come today. Pt will call back to schedule ortho appt.

## 2025-02-14 ENCOUNTER — HOSPITAL ENCOUNTER (OUTPATIENT)
Dept: RADIOLOGY | Facility: HOSPITAL | Age: 75
Discharge: HOME | End: 2025-02-14
Attending: NURSE PRACTITIONER
Payer: MEDICARE

## 2025-02-14 DIAGNOSIS — M70.22 OLECRANON BURSITIS OF LEFT ELBOW: ICD-10-CM

## 2025-02-14 DIAGNOSIS — M25.522 LEFT ELBOW PAIN: ICD-10-CM

## 2025-02-14 PROCEDURE — 73080 X-RAY EXAM OF ELBOW: CPT | Mod: TC,HCNC,PO,LT

## 2025-02-14 PROCEDURE — 73080 X-RAY EXAM OF ELBOW: CPT | Mod: 26,HCNC,LT, | Performed by: STUDENT IN AN ORGANIZED HEALTH CARE EDUCATION/TRAINING PROGRAM

## 2025-02-17 ENCOUNTER — RESULTS FOLLOW-UP (OUTPATIENT)
Dept: FAMILY MEDICINE | Facility: CLINIC | Age: 75
End: 2025-02-17

## 2025-02-19 ENCOUNTER — TELEPHONE (OUTPATIENT)
Dept: FAMILY MEDICINE | Facility: CLINIC | Age: 75
End: 2025-02-19
Payer: MEDICARE

## 2025-02-19 NOTE — TELEPHONE ENCOUNTER
----- Message from Siobhan sent at 2/19/2025 11:11 AM CST -----  Contact: [phone] .[email or web address] Patient is calling for Medical Advice regarding:had a call from the office How long has patient had these symptoms:possibly in regards to Xray Pharmacy name and phone#:Patient wants a call back or [facility]:call back Comments:Please advise Please advise patient replies from provider may take up to 48 hours.

## 2025-02-19 NOTE — TELEPHONE ENCOUNTER
Call returned. Pt stated that he had a missed call in regard to his x ray results. Per pt, he viewed his x ray results on his pt portal and has no further questions at this time.

## 2025-02-22 DIAGNOSIS — Z00.00 ENCOUNTER FOR MEDICARE ANNUAL WELLNESS EXAM: ICD-10-CM

## 2025-04-08 ENCOUNTER — LAB VISIT (OUTPATIENT)
Dept: LAB | Facility: HOSPITAL | Age: 75
End: 2025-04-08
Attending: UROLOGY
Payer: MEDICARE

## 2025-04-08 DIAGNOSIS — R97.20 ELEVATED PROSTATE SPECIFIC ANTIGEN (PSA): Primary | ICD-10-CM

## 2025-04-08 DIAGNOSIS — Z12.5 SPECIAL SCREENING, PROSTATE CANCER: ICD-10-CM

## 2025-04-08 PROCEDURE — 84153 ASSAY OF PSA TOTAL: CPT | Mod: HCNC

## 2025-04-08 PROCEDURE — 36415 COLL VENOUS BLD VENIPUNCTURE: CPT | Mod: HCNC,PO

## 2025-04-09 LAB — PSA SERPL-MCNC: 3.28 NG/ML

## 2025-04-22 NOTE — PROGRESS NOTES
Incidental findings on CT scan  Nonemergent thyroid ultrasound for follow-up in the outpatient setting   Subjective:   Patient ID:  Andrés Casillas is a 73 y.o. male who presents for follow-up of Follow-up      [id number] WM S/P RCA stent 12/18/2023. Denies chest pain, SOB, or edema  Denies palpitations, weak spells, and syncope     ummary         The Prox RCA lesion was 90% stenosed with 0% stenosis post-intervention.    The ejection fraction was calculated to be 60%.    The pre-procedure left ventricular end diastolic pressure was 23.    The post-procedure left ventricular end diastolic pressure was 25.    Prox RCA lesion: A .    Prox RCA lesion: A .    Prox RCA lesion: A stent was successfully placed at 13 NAYELI for 20 sec.    The estimated blood loss was none.    The PCI was successful.     The procedure log was documented by Documenter: Nicola Lizarraga RN and verified by Dylan Johnson MD.     Date: 12/18/2023  Time: 6:35 PM    Review of Systems   Constitutional: Negative for decreased appetite, fever, malaise/fatigue, weight gain and weight loss.   HENT:  Negative for hearing loss and nosebleeds.    Eyes:  Negative for visual disturbance.   Cardiovascular:  Negative for chest pain, claudication, cyanosis, dyspnea on exertion, irregular heartbeat, leg swelling, near-syncope, orthopnea, palpitations, paroxysmal nocturnal dyspnea and syncope.   Respiratory:  Negative for cough, hemoptysis, shortness of breath, sleep disturbances due to breathing, snoring and wheezing.    Endocrine: Negative for cold intolerance, heat intolerance, polydipsia and polyuria.   Hematologic/Lymphatic: Negative for adenopathy and bleeding problem. Does not bruise/bleed easily.   Skin:  Negative for color change, itching, poor wound healing, rash and suspicious lesions.   Musculoskeletal:  Negative for arthritis, back pain, falls, joint pain, joint swelling, muscle cramps, muscle weakness and myalgias.   Gastrointestinal:  Negative for bloating, abdominal pain, change in bowel habit, constipation, flatus, heartburn, hematemesis,  "hematochezia, hemorrhoids, jaundice, melena, nausea and vomiting.   Genitourinary:  Negative for bladder incontinence, decreased libido, frequency, hematuria, hesitancy and urgency.   Neurological:  Negative for brief paralysis, difficulty with concentration, excessive daytime sleepiness, dizziness, focal weakness, headaches, light-headedness, loss of balance, numbness, vertigo and weakness.   Psychiatric/Behavioral:  Negative for altered mental status, depression and memory loss. The patient does not have insomnia and is not nervous/anxious.    Allergic/Immunologic: Negative for environmental allergies, hives and persistent infections.       Objective:   Physical Exam  Constitutional:       General: He is not in acute distress.     Appearance: He is well-developed. He is not diaphoretic.      Comments: /70   Pulse 76   Ht 5' 11" (1.803 m)   Wt 100.7 kg (222 lb)   SpO2 98%   BMI 30.96 kg/m²      HENT:      Head: Normocephalic and atraumatic.   Eyes:      General: Lids are normal. No scleral icterus.        Right eye: No discharge.      Conjunctiva/sclera: Conjunctivae normal.      Pupils: Pupils are equal, round, and reactive to light.   Neck:      Thyroid: No thyromegaly.      Vascular: No JVD.      Trachea: No tracheal deviation.   Cardiovascular:      Rate and Rhythm: Normal rate and regular rhythm.      Pulses: Intact distal pulses.           Carotid pulses are 2+ on the right side and 2+ on the left side.       Radial pulses are 2+ on the right side and 2+ on the left side.        Femoral pulses are 2+ on the right side and 2+ on the left side.       Popliteal pulses are 2+ on the right side and 2+ on the left side.        Dorsalis pedis pulses are 2+ on the right side and 2+ on the left side.        Posterior tibial pulses are 2+ on the right side and 2+ on the left side.      Heart sounds: Normal heart sounds, S1 normal and S2 normal. No murmur heard.     No friction rub. No gallop.   Pulmonary:    "   Effort: Pulmonary effort is normal. No respiratory distress.      Breath sounds: Normal breath sounds. No wheezing or rales.   Chest:      Chest wall: No tenderness.   Abdominal:      General: Bowel sounds are normal. There is no distension.      Palpations: Abdomen is soft. There is no hepatomegaly or mass.      Tenderness: There is no abdominal tenderness. There is no guarding or rebound.   Musculoskeletal:         General: No tenderness. Normal range of motion.      Cervical back: Normal range of motion and neck supple.   Lymphadenopathy:      Cervical: No cervical adenopathy.   Skin:     General: Skin is warm and dry.      Coloration: Skin is not pale.      Findings: No erythema or rash.   Neurological:      Mental Status: He is alert and oriented to person, place, and time.      Cranial Nerves: No cranial nerve deficit.      Coordination: Coordination normal.      Deep Tendon Reflexes: Reflexes are normal and symmetric.   Psychiatric:         Speech: Speech normal.         Behavior: Behavior normal.         Thought Content: Thought content normal.         Judgment: Judgment normal.         Assessment:      1. Essential hypertension    2. RBBB (right bundle branch block with left anterior fascicular block)    3. Abnormal ECG    4. Status post coronary artery stent placement    5. Coronary artery disease involving native coronary artery of native heart without angina pectoris        Plan:   Cardiac status stable   Needs prostate biopsy but will hold off to end of March because of antiplatelet meds   Add lipitor 10 mg     Orders Placed This Encounter   Procedures    Lipid Panel     Follow up in about 6 months (around 8/14/2024).

## 2025-04-23 ENCOUNTER — PATIENT OUTREACH (OUTPATIENT)
Dept: ADMINISTRATIVE | Facility: HOSPITAL | Age: 75
End: 2025-04-23
Payer: MEDICARE

## 2025-04-23 NOTE — PROGRESS NOTES
4/20/2025 Statin Report:    Contacted patient to verify if pt has an allergy to statin medications: No allergy to medication. Patient has CAD and was referred by NP to be placed on statin. Cholesterol levels have been in range for >6 years.

## 2025-05-12 ENCOUNTER — OFFICE VISIT (OUTPATIENT)
Dept: CARDIOLOGY | Facility: CLINIC | Age: 75
End: 2025-05-12
Payer: MEDICARE

## 2025-05-12 VITALS
OXYGEN SATURATION: 95 % | WEIGHT: 230 LBS | SYSTOLIC BLOOD PRESSURE: 110 MMHG | BODY MASS INDEX: 32.08 KG/M2 | HEART RATE: 70 BPM | DIASTOLIC BLOOD PRESSURE: 70 MMHG

## 2025-05-12 DIAGNOSIS — I45.2 RBBB (RIGHT BUNDLE BRANCH BLOCK WITH LEFT ANTERIOR FASCICULAR BLOCK): ICD-10-CM

## 2025-05-12 DIAGNOSIS — I25.10 CORONARY ARTERY DISEASE INVOLVING NATIVE CORONARY ARTERY OF NATIVE HEART WITHOUT ANGINA PECTORIS: Primary | ICD-10-CM

## 2025-05-12 DIAGNOSIS — Z95.5 STATUS POST CORONARY ARTERY STENT PLACEMENT: ICD-10-CM

## 2025-05-12 DIAGNOSIS — I10 ESSENTIAL HYPERTENSION: ICD-10-CM

## 2025-05-12 DIAGNOSIS — E66.811 OBESITY (BMI 30.0-34.9): ICD-10-CM

## 2025-05-12 DIAGNOSIS — E78.2 MIXED HYPERLIPIDEMIA: ICD-10-CM

## 2025-05-12 PROCEDURE — 99214 OFFICE O/P EST MOD 30 MIN: CPT | Mod: HCNC,S$GLB,, | Performed by: INTERNAL MEDICINE

## 2025-05-12 PROCEDURE — 4010F ACE/ARB THERAPY RXD/TAKEN: CPT | Mod: CPTII,HCNC,S$GLB, | Performed by: INTERNAL MEDICINE

## 2025-05-12 PROCEDURE — 3078F DIAST BP <80 MM HG: CPT | Mod: CPTII,HCNC,S$GLB, | Performed by: INTERNAL MEDICINE

## 2025-05-12 PROCEDURE — 3044F HG A1C LEVEL LT 7.0%: CPT | Mod: CPTII,HCNC,S$GLB, | Performed by: INTERNAL MEDICINE

## 2025-05-12 PROCEDURE — 1160F RVW MEDS BY RX/DR IN RCRD: CPT | Mod: CPTII,HCNC,S$GLB, | Performed by: INTERNAL MEDICINE

## 2025-05-12 PROCEDURE — 1159F MED LIST DOCD IN RCRD: CPT | Mod: CPTII,HCNC,S$GLB, | Performed by: INTERNAL MEDICINE

## 2025-05-12 PROCEDURE — 1101F PT FALLS ASSESS-DOCD LE1/YR: CPT | Mod: CPTII,HCNC,S$GLB, | Performed by: INTERNAL MEDICINE

## 2025-05-12 PROCEDURE — 3288F FALL RISK ASSESSMENT DOCD: CPT | Mod: CPTII,HCNC,S$GLB, | Performed by: INTERNAL MEDICINE

## 2025-05-12 PROCEDURE — 1126F AMNT PAIN NOTED NONE PRSNT: CPT | Mod: CPTII,HCNC,S$GLB, | Performed by: INTERNAL MEDICINE

## 2025-05-12 PROCEDURE — 99999 PR PBB SHADOW E&M-EST. PATIENT-LVL III: CPT | Mod: PBBFAC,HCNC,, | Performed by: INTERNAL MEDICINE

## 2025-05-12 PROCEDURE — 3008F BODY MASS INDEX DOCD: CPT | Mod: CPTII,HCNC,S$GLB, | Performed by: INTERNAL MEDICINE

## 2025-05-12 PROCEDURE — 3074F SYST BP LT 130 MM HG: CPT | Mod: CPTII,HCNC,S$GLB, | Performed by: INTERNAL MEDICINE

## 2025-05-12 RX ORDER — PRAVASTATIN SODIUM 20 MG/1
20 TABLET ORAL DAILY
Qty: 90 TABLET | Refills: 3 | Status: SHIPPED | OUTPATIENT
Start: 2025-05-12 | End: 2026-05-12

## 2025-05-12 NOTE — PROGRESS NOTES
Subjective   Patient ID:  Andrés Casillas is a 75 y.o. male who presents for follow-up of Follow-up      [id number] WM S/P RCA stent 12/18/2023 . Denies chest pain, SOB, or edema  Denies palpitations, weak spells, and syncope     Review of Systems   Constitutional: Positive for weight gain. Negative for decreased appetite, fever, malaise/fatigue and weight loss.   HENT:  Negative for hearing loss and nosebleeds.    Eyes:  Negative for visual disturbance.   Cardiovascular:  Negative for chest pain, claudication, cyanosis, dyspnea on exertion, irregular heartbeat, leg swelling, near-syncope, orthopnea, palpitations, paroxysmal nocturnal dyspnea and syncope.   Respiratory:  Negative for cough, hemoptysis, shortness of breath, sleep disturbances due to breathing, snoring and wheezing.    Endocrine: Negative for cold intolerance, heat intolerance, polydipsia and polyuria.   Hematologic/Lymphatic: Negative for adenopathy and bleeding problem. Does not bruise/bleed easily.   Skin:  Negative for color change, itching, poor wound healing, rash and suspicious lesions.   Musculoskeletal:  Negative for arthritis, back pain, falls, joint pain, joint swelling, muscle cramps, muscle weakness and myalgias.   Gastrointestinal:  Negative for bloating, abdominal pain, change in bowel habit, constipation, flatus, heartburn, hematemesis, hematochezia, hemorrhoids, jaundice, melena, nausea and vomiting.   Genitourinary:  Negative for bladder incontinence, decreased libido, frequency, hematuria, hesitancy and urgency.   Neurological:  Negative for brief paralysis, difficulty with concentration, excessive daytime sleepiness, dizziness, focal weakness, headaches, light-headedness, loss of balance, numbness, vertigo and weakness.   Psychiatric/Behavioral:  Negative for altered mental status, depression and memory loss. The patient does not have insomnia and is not nervous/anxious.    Allergic/Immunologic: Negative for environmental  allergies, hives and persistent infections.          Objective     Physical Exam  Constitutional:       General: He is not in acute distress.     Appearance: He is well-developed. He is not diaphoretic.      Comments: /70 (BP Location: Left arm, Patient Position: Sitting)   Pulse 70   Wt 104.3 kg (230 lb)   SpO2 95%   BMI 32.08 kg/m²      HENT:      Head: Normocephalic and atraumatic.   Eyes:      General: Lids are normal. No scleral icterus.        Right eye: No discharge.      Conjunctiva/sclera: Conjunctivae normal.      Pupils: Pupils are equal, round, and reactive to light.   Neck:      Thyroid: No thyromegaly.      Vascular: No JVD.      Trachea: No tracheal deviation.   Cardiovascular:      Rate and Rhythm: Normal rate and regular rhythm.      Pulses: Intact distal pulses.           Carotid pulses are 2+ on the right side and 2+ on the left side.       Radial pulses are 2+ on the right side and 2+ on the left side.        Femoral pulses are 2+ on the right side and 2+ on the left side.       Popliteal pulses are 2+ on the right side and 2+ on the left side.        Dorsalis pedis pulses are 2+ on the right side and 2+ on the left side.        Posterior tibial pulses are 2+ on the right side and 2+ on the left side.      Heart sounds: Normal heart sounds, S1 normal and S2 normal. No murmur heard.     No friction rub. No gallop.   Pulmonary:      Effort: Pulmonary effort is normal. No respiratory distress.      Breath sounds: Normal breath sounds. No wheezing or rales.   Chest:      Chest wall: No tenderness.   Abdominal:      General: Bowel sounds are normal. There is no distension.      Palpations: Abdomen is soft. There is no hepatomegaly or mass.      Tenderness: There is no abdominal tenderness. There is no guarding or rebound.   Musculoskeletal:         General: No tenderness. Normal range of motion.      Cervical back: Normal range of motion and neck supple.   Lymphadenopathy:      Cervical: No  cervical adenopathy.   Skin:     General: Skin is warm and dry.      Coloration: Skin is not pale.      Findings: No erythema or rash.   Neurological:      Mental Status: He is alert and oriented to person, place, and time.      Cranial Nerves: No cranial nerve deficit.      Coordination: Coordination normal.      Deep Tendon Reflexes: Reflexes are normal and symmetric.   Psychiatric:         Speech: Speech normal.         Behavior: Behavior normal.         Thought Content: Thought content normal.         Judgment: Judgment normal.            Assessment and Plan     1. Coronary artery disease involving native coronary artery of native heart without angina pectoris stable   2. Essential hypertension controlled   3. RBBB (right bundle branch block with left anterior fascicular block)    4. Obesity (BMI 30.0-34.9) discussed weight losss   5. Status post coronary artery stent placement    6. Mixed hyperlipidemia add pravachol to lower LDL to below 100       Plan:  Patient advised to modify risk factors such as weight, exercise, diet,  tobacco and alcohol exposure      Orders Placed This Encounter   Procedures    Lipid Panel     Follow up in about 6 months (around 11/12/2025).     Advance Care Planning     Date: 05/12/2025

## 2025-06-16 ENCOUNTER — LAB VISIT (OUTPATIENT)
Dept: LAB | Facility: HOSPITAL | Age: 75
End: 2025-06-16
Attending: INTERNAL MEDICINE
Payer: MEDICARE

## 2025-06-16 ENCOUNTER — TELEPHONE (OUTPATIENT)
Dept: CARDIOLOGY | Facility: CLINIC | Age: 75
End: 2025-06-16
Payer: MEDICARE

## 2025-06-16 ENCOUNTER — RESULTS FOLLOW-UP (OUTPATIENT)
Dept: CARDIOLOGY | Facility: CLINIC | Age: 75
End: 2025-06-16

## 2025-06-16 DIAGNOSIS — Z79.899 ENCOUNTER FOR LONG-TERM (CURRENT) USE OF MEDICATIONS: ICD-10-CM

## 2025-06-16 DIAGNOSIS — I10 ESSENTIAL HYPERTENSION: Chronic | ICD-10-CM

## 2025-06-16 DIAGNOSIS — E78.2 MIXED HYPERLIPIDEMIA: ICD-10-CM

## 2025-06-16 DIAGNOSIS — Z00.01 ENCOUNTER FOR GENERAL ADULT MEDICAL EXAMINATION WITH ABNORMAL FINDINGS: ICD-10-CM

## 2025-06-16 LAB
ALBUMIN SERPL BCP-MCNC: 4.4 G/DL (ref 3.5–5.2)
ALP SERPL-CCNC: 96 UNIT/L (ref 40–150)
ALT SERPL W/O P-5'-P-CCNC: 27 UNIT/L (ref 10–44)
ANION GAP (OHS): 10 MMOL/L (ref 8–16)
AST SERPL-CCNC: 24 UNIT/L (ref 11–45)
BILIRUB SERPL-MCNC: 0.6 MG/DL (ref 0.1–1)
BUN SERPL-MCNC: 43 MG/DL (ref 8–23)
CALCIUM SERPL-MCNC: 9.1 MG/DL (ref 8.7–10.5)
CHLORIDE SERPL-SCNC: 102 MMOL/L (ref 95–110)
CHOLEST SERPL-MCNC: 184 MG/DL (ref 120–199)
CHOLEST/HDLC SERPL: 3.9 {RATIO} (ref 2–5)
CO2 SERPL-SCNC: 25 MMOL/L (ref 23–29)
CREAT SERPL-MCNC: 1.3 MG/DL (ref 0.5–1.4)
EAG (OHS): 108 MG/DL (ref 68–131)
GFR SERPLBLD CREATININE-BSD FMLA CKD-EPI: 58 ML/MIN/1.73/M2
GLUCOSE SERPL-MCNC: 100 MG/DL (ref 70–110)
HBA1C MFR BLD: 5.4 % (ref 4–5.6)
HDLC SERPL-MCNC: 47 MG/DL (ref 40–75)
HDLC SERPL: 25.5 % (ref 20–50)
LDLC SERPL CALC-MCNC: 106 MG/DL (ref 63–159)
NONHDLC SERPL-MCNC: 137 MG/DL
POTASSIUM SERPL-SCNC: 4.4 MMOL/L (ref 3.5–5.1)
PROT SERPL-MCNC: 7 GM/DL (ref 6–8.4)
SODIUM SERPL-SCNC: 137 MMOL/L (ref 136–145)
TRIGL SERPL-MCNC: 155 MG/DL (ref 30–150)

## 2025-06-16 PROCEDURE — 82310 ASSAY OF CALCIUM: CPT | Mod: HCNC

## 2025-06-16 PROCEDURE — 36415 COLL VENOUS BLD VENIPUNCTURE: CPT | Mod: HCNC,PO

## 2025-06-16 PROCEDURE — 80061 LIPID PANEL: CPT | Mod: HCNC

## 2025-06-16 PROCEDURE — 83036 HEMOGLOBIN GLYCOSYLATED A1C: CPT | Mod: HCNC

## 2025-06-16 RX ORDER — PRAVASTATIN SODIUM 40 MG/1
40 TABLET ORAL DAILY
Qty: 90 TABLET | Refills: 3 | Status: SHIPPED | OUTPATIENT
Start: 2025-06-16 | End: 2025-06-17

## 2025-06-16 NOTE — TELEPHONE ENCOUNTER
Attempted unsuccessfully to reach patient. Left voicemail for patient to return my call at [phone].

## 2025-06-17 RX ORDER — PRAVASTATIN SODIUM 20 MG/1
20 TABLET ORAL DAILY
Qty: 90 TABLET | Refills: 3 | Status: SHIPPED | OUTPATIENT
Start: 2025-06-17 | End: 2026-06-17

## 2025-06-19 ENCOUNTER — OFFICE VISIT (OUTPATIENT)
Dept: FAMILY MEDICINE | Facility: CLINIC | Age: 75
End: 2025-06-19
Payer: MEDICARE

## 2025-06-19 VITALS
HEIGHT: 71 IN | OXYGEN SATURATION: 95 % | TEMPERATURE: 98 F | SYSTOLIC BLOOD PRESSURE: 122 MMHG | RESPIRATION RATE: 17 BRPM | HEART RATE: 72 BPM | WEIGHT: 225.88 LBS | DIASTOLIC BLOOD PRESSURE: 60 MMHG | BODY MASS INDEX: 31.62 KG/M2

## 2025-06-19 DIAGNOSIS — E78.5 HYPERLIPIDEMIA, UNSPECIFIED HYPERLIPIDEMIA TYPE: ICD-10-CM

## 2025-06-19 DIAGNOSIS — Z79.899 ENCOUNTER FOR LONG-TERM CURRENT USE OF MEDICATION: ICD-10-CM

## 2025-06-19 DIAGNOSIS — F13.20 BENZODIAZEPINE DEPENDENCE, CONTINUOUS: Chronic | ICD-10-CM

## 2025-06-19 DIAGNOSIS — Z12.11 COLON CANCER SCREENING: ICD-10-CM

## 2025-06-19 DIAGNOSIS — F41.9 ANXIETY: Primary | Chronic | ICD-10-CM

## 2025-06-19 DIAGNOSIS — I10 ESSENTIAL HYPERTENSION: ICD-10-CM

## 2025-06-19 DIAGNOSIS — Z95.5 STATUS POST CORONARY ARTERY STENT PLACEMENT: ICD-10-CM

## 2025-06-19 DIAGNOSIS — N28.9 DECREASED RENAL FUNCTION: ICD-10-CM

## 2025-06-19 PROBLEM — R79.89 LOW TESTOSTERONE: Status: RESOLVED | Noted: 2022-10-10 | Resolved: 2025-06-19

## 2025-06-19 PROBLEM — Z11.59 NEED FOR HEPATITIS C SCREENING TEST: Status: RESOLVED | Noted: 2022-05-27 | Resolved: 2025-06-19

## 2025-06-19 PROBLEM — M67.431 GANGLION CYST OF WRIST, RIGHT: Status: RESOLVED | Noted: 2020-10-16 | Resolved: 2025-06-19

## 2025-06-19 PROBLEM — M72.2 PLANTAR FASCIITIS: Status: RESOLVED | Noted: 2024-01-05 | Resolved: 2025-06-19

## 2025-06-19 PROBLEM — R53.83 DECREASED ENERGY: Status: RESOLVED | Noted: 2023-06-19 | Resolved: 2025-06-19

## 2025-06-19 PROBLEM — R10.13 EPIGASTRIC PAIN: Status: RESOLVED | Noted: 2020-10-16 | Resolved: 2025-06-19

## 2025-06-19 PROCEDURE — G2211 COMPLEX E/M VISIT ADD ON: HCPCS | Mod: HCNC,S$GLB,, | Performed by: NURSE PRACTITIONER

## 2025-06-19 PROCEDURE — 3078F DIAST BP <80 MM HG: CPT | Mod: CPTII,HCNC,S$GLB, | Performed by: NURSE PRACTITIONER

## 2025-06-19 PROCEDURE — 99999 PR PBB SHADOW E&M-EST. PATIENT-LVL V: CPT | Mod: PBBFAC,HCNC,, | Performed by: NURSE PRACTITIONER

## 2025-06-19 PROCEDURE — 3288F FALL RISK ASSESSMENT DOCD: CPT | Mod: CPTII,HCNC,S$GLB, | Performed by: NURSE PRACTITIONER

## 2025-06-19 PROCEDURE — 1160F RVW MEDS BY RX/DR IN RCRD: CPT | Mod: CPTII,HCNC,S$GLB, | Performed by: NURSE PRACTITIONER

## 2025-06-19 PROCEDURE — 1159F MED LIST DOCD IN RCRD: CPT | Mod: CPTII,HCNC,S$GLB, | Performed by: NURSE PRACTITIONER

## 2025-06-19 PROCEDURE — 1101F PT FALLS ASSESS-DOCD LE1/YR: CPT | Mod: CPTII,HCNC,S$GLB, | Performed by: NURSE PRACTITIONER

## 2025-06-19 PROCEDURE — 1126F AMNT PAIN NOTED NONE PRSNT: CPT | Mod: CPTII,HCNC,S$GLB, | Performed by: NURSE PRACTITIONER

## 2025-06-19 PROCEDURE — 3074F SYST BP LT 130 MM HG: CPT | Mod: CPTII,HCNC,S$GLB, | Performed by: NURSE PRACTITIONER

## 2025-06-19 PROCEDURE — 99214 OFFICE O/P EST MOD 30 MIN: CPT | Mod: HCNC,S$GLB,, | Performed by: NURSE PRACTITIONER

## 2025-06-19 PROCEDURE — 3044F HG A1C LEVEL LT 7.0%: CPT | Mod: CPTII,HCNC,S$GLB, | Performed by: NURSE PRACTITIONER

## 2025-06-19 PROCEDURE — 3008F BODY MASS INDEX DOCD: CPT | Mod: CPTII,HCNC,S$GLB, | Performed by: NURSE PRACTITIONER

## 2025-06-19 PROCEDURE — 4010F ACE/ARB THERAPY RXD/TAKEN: CPT | Mod: CPTII,HCNC,S$GLB, | Performed by: NURSE PRACTITIONER

## 2025-06-19 RX ORDER — ALPRAZOLAM 1 MG/1
1 TABLET ORAL 3 TIMES DAILY
Qty: 90 TABLET | Refills: 5 | Status: SHIPPED | OUTPATIENT
Start: 2025-07-03

## 2025-06-19 NOTE — ASSESSMENT & PLAN NOTE
He had not been taking statin. He was recently restarted on pravastatin per cardiology. Proceed with medication as prescribed. LDL above goal. Hx of stents. Update labs in 3 months. Counseled on hyperlipidemia disease course, healthy diet and increased need for exercise. Please be advised of the risk of cardiovascular disease, increase stroke and heart attack risk with uncontrolled/untreated hyperlipidemia. Patient voiced understanding and understood the treatment plan. All questions were answered.

## 2025-06-19 NOTE — ASSESSMENT & PLAN NOTE
He defers at this time. He is going to ask his sister who she used and will let us know for scheduling.

## 2025-06-19 NOTE — PROGRESS NOTES
Assessment/Plan:    1. Anxiety  Overview:  No changes in history. Remains on Xanax PRN as prescribed. Symptoms are stable. He has been on this medication for a few years. No SE reported.     June 2024:  Patient needing medication refills.  January 2024:  Patient reports compliance with medications.  No side effects reported.  Symptoms are controlled.  Patient has increased anxiety about his health.  Recently had stent placed in his heart.    Chronic.  August 2023: Patient here for medication refill.  Reports compliance.  No side effects reported.  Symptoms are controlled.  Stable.  Patient on chronic benzodiazepine from previous PCP.  Transitioning care to me.The patient was checked in the [facility] Board of Pharmacy's  Prescription Monitoring Program. There appears to be no incongruities with the patient's verbalized history.   No abuse suspected.  February 2021:  Patient had PVCs on cardiac workup.  Patient reports cardiology has adjusted medications that he is doing much better.  Blood pressure has been well controlled.  May 2022:  Patient is here for medication refills.  He continues to have symptoms that are intermittently controlled with medication.    Assessment & Plan:   reviewed. Discussed medication risks. Refill Xanax for six months.  Follow-up in six months.  Discussed condition course and signs and symptoms to expect.  Patient advised of risk and benefits of medication use.  ER precautions.  Call MD or follow-up to clinic if not improving or worsening symptoms.    Please be advised of condition course.  SNRI/SSRI is first-line treatment for this condition.  Please be advised of the risk of discontinuing this medication without tapering/contacting MD.  Patient has been advised of side effects, and all questions were answered.  Patient voiced understanding.  Patient will follow up routinely and notify us if having any side effects or worsening or persistent symptoms.  ER precautions were given.  Antidepressant/Antianxiety Medication Initiation:  Patient informed of risks, benefits, and potential side effects of medication and accepts informed consent.  Common side effects include nausea, fatigue, headache, insomnia, etc see medication insert for complete side effect profile.  Most importantly be advised of the possibility of new or worsening suicidal thoughts/depression/anxiety etcetera.  Please be advised to stop the medication immediately and seek urgent treatment if this occurs.  Therefore please do not to abruptly discontinue medication without physician guidance except in cases of sudden onset or worsening of SI.     Orders:  -     ALPRAZolam (XANAX) 1 MG tablet; Take 1 tablet (1 mg total) by mouth 3 (three) times daily.  Dispense: 90 tablet; Refill: 5    2. Benzodiazepine dependence, continuous  Overview:  Chronic.  Stable.   reviewed.  On long-term Xanax as needed for panic attacks and anxiety      Assessment & Plan:  Stable condition.  Refilling medication.      3. Essential hypertension  Overview:  CHRONIC. STABLE. BP Reviewed.  Compliant with BP medications. No SE reported.   (-) CP, SOB, palpitations, dizziness, lightheadedness, HA, arm numbness, tingling or weakness, syncope.    Hypertension Medications              lisinopriL (PRINIVIL,ZESTRIL) 40 MG tablet take 1 tablet by mouth once DAILY    terazosin (HYTRIN) 5 MG capsule Take 5 mg by mouth every evening.     Creatinine   Date Value Ref Range Status   07/14/2020 1.0 0.5 - 1.4 mg/dL Final       Assessment & Plan:  Continue current blood pressure medication regimen.Counseled on importance of hypertension disease course, I recommend ongoing Education for DASH-diet and exercise. Counseled on medication regimen importance of treating high blood pressure. Please be advised of risk of untreated blood pressure as discussed. Please advised of ER precautions were given for symptoms of hypertensive urgency and emergency.       4. Hyperlipidemia,  unspecified hyperlipidemia type  Overview:  CHRONIC. STABLE. Lab analysis reviewed.   (-) CP, SOB, abdominal pain, N/V/D, constipation, jaundice, skin changes.  (-) Myalgias    Hyperlipidemia Medications              fish oil-omega-3 fatty acids 300-1,000 mg capsule Take 1 capsule by mouth once daily.     pravastatin (PRAVACHOL) 20 MG tablet Take 1 tablet (20 mg total) by mouth once daily.     Lab Results   Component Value Date    CHOL 184 06/16/2025    CHOL 177 01/14/2025    CHOL 166 08/14/2024     Lab Results   Component Value Date    HDL 47 06/16/2025    HDL 56 01/14/2025    HDL 54 08/14/2024     Lab Results   Component Value Date    LDLCALC 106.0 06/16/2025    LDLCALC 106.4 01/14/2025    LDLCALC 99.2 08/14/2024     Lab Results   Component Value Date    TRIG 155 (H) 06/16/2025    TRIG 73 01/14/2025    TRIG 64 08/14/2024     Lab Results   Component Value Date    CHOLHDL 25.5 06/16/2025    CHOLHDL 31.6 01/14/2025    CHOLHDL 32.5 08/14/2024     Lab Results   Component Value Date    TOTALCHOLEST 3.9 06/16/2025    TOTALCHOLEST 3.2 01/14/2025    TOTALCHOLEST 3.1 08/14/2024     Lab Results   Component Value Date    ALT 27 06/16/2025    AST 24 06/16/2025    ALKPHOS 96 06/16/2025    BILITOT 0.6 06/16/2025     ======================================================  The 10-year ASCVD risk score (Mustapha CONTI, et al., 2019) is: 23.2%    Values used to calculate the score:      Age: 75 years      Sex: Male      Is Non- : No      Diabetic: No      Tobacco smoker: No      Systolic Blood Pressure: 122 mmHg      Is BP treated: Yes      HDL Cholesterol: 47 mg/dL      Total Cholesterol: 184 mg/dL      Assessment & Plan:  He had not been taking statin. He was recently restarted on pravastatin per cardiology. Proceed with medication as prescribed. LDL above goal. Hx of stents. Update labs in 3 months. Counseled on hyperlipidemia disease course, healthy diet and increased need for exercise. Please be advised of  the risk of cardiovascular disease, increase stroke and heart attack risk with uncontrolled/untreated hyperlipidemia. Patient voiced understanding and understood the treatment plan. All questions were answered.       5. Status post coronary artery stent placement  Overview:  Patient with recent stent placed. He is no longer taking plavix. He is taking ASA and statin.     Cardiac catheterization    The Prox RCA lesion was 90% stenosed with 0% stenosis   post-intervention.    The ejection fraction was calculated to be 60%.    The pre-procedure left ventricular end diastolic pressure was 23.    The post-procedure left ventricular end diastolic pressure was 25.    Prox RCA lesion: A .    Prox RCA lesion: A .    Prox RCA lesion: A stent was successfully placed at 13 NAYELI for 20 sec.    The estimated blood loss was none.    The PCI was successful.    The procedure log was documented by Documenter: Nicola Lizarraga RN and   verified by Dylan Johnson MD.    Date: 12/18/2023  Time: 6:35 PM        Assessment & Plan:  Continue medications by Cardiology.  Follow-up with Cardiology.  ER precautions for severe symptoms.  Monitoring labs.  Controlling blood pressure.      6. Decreased renal function  Overview:  BMP  Lab Results   Component Value Date     06/16/2025    K 4.4 06/16/2025     06/16/2025    CO2 25 06/16/2025    BUN 43 (H) 06/16/2025    CREATININE 1.3 06/16/2025    CALCIUM 9.1 06/16/2025    ANIONGAP 10 06/16/2025    EGFRNORACEVR 58 (L) 06/16/2025       Assessment & Plan:  Avoid NSAIDs. Increase hydration with water. Monitor labs.       7. Colon cancer screening  Assessment & Plan:  He defers at this time. He is going to ask his sister who she used and will let us know for scheduling.       8. Encounter for long-term current use of medication  Overview:  CHRONIC. Stable. Compliant with medications for managed conditions. See medication list. No SE reported. Routine lab analysis is being monitored. Refills  were addressed. Reviewed labs.    Lab Results   Component Value Date    WBC 5.11 01/14/2025    HGB 14.1 01/14/2025    HCT 42.0 01/14/2025    MCV 94 01/14/2025     01/14/2025         Chemistry        Component Value Date/Time     06/16/2025 0952     01/14/2025 0925    K 4.4 06/16/2025 0952    K 4.6 01/14/2025 0925     06/16/2025 0952     01/14/2025 0925    CO2 25 06/16/2025 0952    CO2 25 01/14/2025 0925    BUN 43 (H) 06/16/2025 0952    CREATININE 1.3 06/16/2025 0952     06/16/2025 0952    GLU 95 01/14/2025 0925        Component Value Date/Time    CALCIUM 9.1 06/16/2025 0952    CALCIUM 9.5 01/14/2025 0925    ALKPHOS 96 06/16/2025 0952    ALKPHOS 93 01/14/2025 0925    AST 24 06/16/2025 0952    AST 26 01/14/2025 0925    ALT 27 06/16/2025 0952    ALT 28 01/14/2025 0925    BILITOT 0.6 06/16/2025 0952    BILITOT 0.9 01/14/2025 0925    ESTGFRAFRICA >60.0 05/27/2022 1129    EGFRNONAA >60.0 05/27/2022 1129        Lab Results   Component Value Date    TSH 2.382 01/14/2025    R1ZVDMT 6.5 06/30/2011       Assessment & Plan:  Complete history and physical was completed today.  Complete and thorough medication reconciliation was performed.  Discussed risks and benefits of medications.  Advised patient on orders and health maintenance.  We discussed old records and old labs if available.  Will request any records not available through epic.  Continue current medications listed on your summary sheet.      Follow up in about 6 months (around 12/19/2025), or if symptoms worsen or fail to improve.  ER precautions for severe or worsening symptoms.     Ciara Winston NP  _____________________________________________________________________________________________________________________________________________________    CC: follow up, refill     HPI: Patient is a 75-year-old male who presents in clinic today as an established patient here for follow up on recent labs and xanax refills.      LABS:  He reports recent labs may have been affected by increased alcohol consumption at an [employer] party the night before. His triglycerides were slightly elevated, and renal function was decreased, potentially due to dehydration. He has been frequently taking Advil as well. He is addressing this by increasing water intake. A1C was normal. He denies symptoms related to laboratory abnormalities and is motivated to improve health through dietary modifications and hydration.    HYPERTENSION:  He reports improvement in blood pressure with current medication regimen. He is reducing medication dosage and experiencing lower blood pressure readings. He notes anxiety may contribute to elevated blood pressure measurements in clinical settings. The patient is currently being treated for essential hypertension. This condition is chronic and stable. The patient is tolerating their medication well with good compliance.  Denies any adverse effects of medications.  Counseling was offered regarding low sodium diet.  The patient has a reduced salt intake. Routine exercise recommended. The patient denies headache, vision changes, chest pain, palpitations, shortness of breath, or lower extremity edema.    HYPERLIPIDEMIA:  He had not been taking statin. He was restarted on pravastatin by cardiology. This is a chronic problem. The current episode started more than 1 year ago. The problem is controlled. Recent lipid tests were reviewed and are variable. Pertinent negatives include no chest pain or shortness of breath. Current antihyperlipidemic treatment includes statins. The current treatment provides moderate improvement of lipids. There are no compliance problems.      MEDICATIONS:  He takes Xanax 3 times daily as needed, not consistently daily. He is requesting refill. Anxiety is controlled on regimen. No SE reported.     PREVENTIVE CARE:  He is overdue for colonoscopy screening since 2020 and has not yet scheduled the procedure. He  is going to ask his sister who she used and will let us know.     Past Medical History:  Past Medical History:   Diagnosis Date    Anxiety     BPH (benign prostatic hypertrophy)     DDD (degenerative disc disease), lumbar     Hypertension     GAURANG (obstructive sleep apnea)      Past Surgical History:   Procedure Laterality Date    JOINT REPLACEMENT      LEFT HEART CATHETERIZATION Left 12/18/2023    Procedure: Left heart cath;  Surgeon: Dylan Johnson MD;  Location: [facility] CATH LAB;  Service: Cardiology;  Laterality: Left;  Repeat K+ stat upon admit    PERCUTANEOUS CORONARY INTERVENTION, ARTERY N/A 12/18/2023    Procedure: Percutaneous coronary intervention;  Surgeon: Dylan Johnson MD;  Location: [facility] CATH LAB;  Service: Cardiology;  Laterality: N/A;    STENT, DRUG ELUTING, SINGLE VESSEL, CORONARY  12/18/2023    Procedure: Stent, Drug Eluting, Single Vessel, Coronary;  Surgeon: Dylan Johnson MD;  Location: [facility] CATH LAB;  Service: Cardiology;;     Review of patient's allergies indicates:   Allergen Reactions    No known drug allergies      Social History[1]  Family History   Problem Relation Name Age of Onset    Heart disease Mother      Cancer Mother      Heart disease Father      Prostate cancer Brother       Medications Ordered Prior to Encounter[2]    Review of Systems   Constitutional:  Negative for appetite change, chills, fatigue and fever.   HENT:  Negative for congestion, rhinorrhea and sore throat.    Eyes:  Negative for visual disturbance.   Respiratory:  Negative for cough and shortness of breath.    Cardiovascular:  Negative for chest pain, palpitations and leg swelling.   Gastrointestinal:  Negative for abdominal pain, diarrhea and vomiting.   Genitourinary:  Negative for difficulty urinating, dysuria and hematuria.   Musculoskeletal:  Negative for arthralgias and myalgias.   Skin:  Negative for rash and wound.   Neurological:  Negative for dizziness and headaches.  "  Psychiatric/Behavioral:  Negative for behavioral problems and dysphoric mood. The patient is nervous/anxious (stable on medication).      Vitals:    06/19/25 1322   BP: 122/60   Pulse: 72   Resp: 17   Temp: 97.6 °F (36.4 °C)   TempSrc: Oral   SpO2: 95%   Weight: 102.5 kg (225 lb 14.4 oz)   Height: 5' 11" (1.803 m)     Wt Readings from Last 3 Encounters:   06/19/25 102.5 kg (225 lb 14.4 oz)   05/12/25 104.3 kg (230 lb)   08/19/24 98.8 kg (217 lb 14.4 oz)     Physical Exam  Vitals reviewed.   Constitutional:       General: He is not in acute distress.     Appearance: Normal appearance. He is not ill-appearing.   HENT:      Head: Normocephalic and atraumatic.      Right Ear: External ear normal.      Left Ear: External ear normal.      Nose: Nose normal.   Eyes:      Extraocular Movements: Extraocular movements intact.      Conjunctiva/sclera: Conjunctivae normal.   Cardiovascular:      Rate and Rhythm: Normal rate.      Heart sounds: Normal heart sounds.   Pulmonary:      Effort: Pulmonary effort is normal. No respiratory distress.      Breath sounds: Normal breath sounds.   Abdominal:      General: Abdomen is flat. There is no distension.   Musculoskeletal:         General: Normal range of motion.      Cervical back: Normal range of motion.      Right lower leg: No edema.      Left lower leg: No edema.   Skin:     General: Skin is warm and dry.      Capillary Refill: Capillary refill takes less than 2 seconds.      Coloration: Skin is not pale.      Findings: No rash.   Neurological:      General: No focal deficit present.      Mental Status: He is alert and oriented to person, place, and time. Mental status is at baseline.      Motor: No weakness.      Gait: Gait normal.   Psychiatric:         Attention and Perception: He is attentive.         Mood and Affect: Mood normal. Mood is not anxious, depressed or elated. Affect is not labile, blunt, flat, angry or tearful.         Speech: Speech normal. He is " communicative. Speech is not rapid and pressured, delayed or slurred.         Behavior: Behavior normal. Behavior is not agitated, slowed, aggressive, withdrawn, hyperactive or combative. Behavior is cooperative.         Thought Content: Thought content normal.         Judgment: Judgment normal.       Health Maintenance   Topic Date Due    TETANUS VACCINE  Never done    High Dose Statin  Never done    Shingles Vaccine (1 of 2) Never done    RSV Vaccine (Age 60+ and Pregnant patients) (1 - Risk 60-74 years 1-dose series) Never done    Colorectal Cancer Screening  01/22/2023    COVID-19 Vaccine (4 - 2024-25 season) 09/01/2024    Influenza Vaccine (Season Ended) 09/01/2025    PROSTATE-SPECIFIC ANTIGEN  04/08/2026    Lipid Panel  06/16/2026    Aspirin/Antiplatelet Therapy  06/19/2026    Hepatitis C Screening  Completed    Pneumococcal Vaccines (Age 50+)  Completed     This note was generated with the assistance of ambient listening technology. Verbal consent was obtained by the patient and accompanying visitor(s) for the recording of patient appointment to facilitate this note. I attest to having reviewed and edited the generated note for accuracy, though some syntax or spelling errors may persist. Please contact the author of this note for any clarification.    Visit today included increased complexity associated with the care of the episodic problem - see above- addressed and managing the longitudinal care of the patient due to the serious and/or complex managed problem(s) - see above.             [1]   Social History  Tobacco Use    Smoking status: Never    Smokeless tobacco: Never   Substance Use Topics    Alcohol use: Yes     Alcohol/week: 1.7 standard drinks of alcohol     Types: 2 Standard drinks or equivalent per week     Comment: socially    Drug use: No   [2]   Current Outpatient Medications on File Prior to Visit   Medication Sig Dispense Refill    albuterol (PROVENTIL/VENTOLIN HFA) 90 mcg/actuation inhaler  INHALE 2 PUFFS BY MOUTH EVERY 6 HOURS AS NEEDED FOR WHEEZE 54 g 2    aspirin 81 MG Chew Take 81 mg by mouth once daily.      buPROPion (WELLBUTRIN XL) 300 MG 24 hr tablet TAKE 1 TABLET BY MOUTH EVERY DAY 90 tablet 3    cholecalciferol, vitamin D3, (VITAMIN D3) 25 mcg (1,000 unit) capsule Take 1,000 Units by mouth.      co-enzyme Q-10 30 mg capsule Take 30 mg by mouth once daily.       cyanocobalamin (VITAMIN B-12) 100 MCG tablet Take 100 mcg by mouth.      diclofenac (VOLTAREN) 75 MG EC tablet Take 1 tablet (75 mg total) by mouth 2 (two) times daily as needed (pain). 30 tablet 0    DIINDOLYLMETHANE, BULK, MISC 100 mg by Other route.      docusate sodium (COLACE) 100 MG capsule Take 1 capsule (100 mg total) by mouth 2 (two) times daily as needed for Constipation. 60 capsule 0    dorzolamide-timolol, PF, (COSOPT PF) 2-0.5 % Dpet ophthalmic solution       fish oil-omega-3 fatty acids 300-1,000 mg capsule Take 1 capsule by mouth once daily.       lisinopriL (PRINIVIL,ZESTRIL) 40 MG tablet take 1 tablet by mouth once DAILY 90 tablet 3    magnesium oxide (MAG-OX) 400 mg (241.3 mg magnesium) tablet Take 400 mg by mouth once daily.       pravastatin (PRAVACHOL) 20 MG tablet Take 1 tablet (20 mg total) by mouth once daily. 90 tablet 3    terazosin (HYTRIN) 5 MG capsule Take 5 mg by mouth every evening.      testosterone cypionate (DEPOTESTOTERONE CYPIONATE) 100 mg/mL injection Inject into the muscle.      zinc gluconate 50 mg tablet Take 50 mg by mouth.      [DISCONTINUED] ALPRAZolam (XANAX) 1 MG tablet Take 1 tablet (1 mg total) by mouth 3 (three) times daily. 90 tablet 5    famotidine (PEPCID) 40 MG tablet Take 1 tablet (40 mg total) by mouth once daily. 30 tablet 0    [DISCONTINUED] latanoprost 0.005 % ophthalmic solution Place 1 drop into both eyes nightly. (Patient not taking: Reported on 6/19/2025)      [DISCONTINUED] meclizine (ANTIVERT) 25 mg tablet Take 1 tablet (25 mg total) by mouth 3 (three) times daily as needed  for Dizziness. (Patient not taking: Reported on 6/19/2025) 30 tablet 0    [DISCONTINUED] predniSONE (DELTASONE) 10 MG tablet Take 1 tablet (10 mg total) by mouth 2 (two) times daily. (Patient not taking: Reported on 6/19/2025) 10 tablet 0     Current Facility-Administered Medications on File Prior to Visit   Medication Dose Route Frequency Provider Last Rate Last Admin    sodium chloride 0.9% flush 10 mL  10 mL Intravenous PRN Dylan Johnson MD

## 2025-07-07 ENCOUNTER — TELEPHONE (OUTPATIENT)
Dept: FAMILY MEDICINE | Facility: CLINIC | Age: 75
End: 2025-07-07
Payer: MEDICARE

## 2025-07-07 ENCOUNTER — LAB VISIT (OUTPATIENT)
Dept: LAB | Facility: HOSPITAL | Age: 75
End: 2025-07-07
Attending: FAMILY MEDICINE
Payer: MEDICARE

## 2025-07-07 DIAGNOSIS — N28.9 FUNCTION KIDNEY DECREASED: Primary | ICD-10-CM

## 2025-07-07 DIAGNOSIS — N28.9 FUNCTION KIDNEY DECREASED: ICD-10-CM

## 2025-07-07 LAB
ALBUMIN SERPL BCP-MCNC: 4.2 G/DL (ref 3.5–5.2)
ANION GAP (OHS): 10 MMOL/L (ref 8–16)
BUN SERPL-MCNC: 21 MG/DL (ref 8–23)
CALCIUM SERPL-MCNC: 9.1 MG/DL (ref 8.7–10.5)
CHLORIDE SERPL-SCNC: 105 MMOL/L (ref 95–110)
CO2 SERPL-SCNC: 22 MMOL/L (ref 23–29)
CREAT SERPL-MCNC: 0.9 MG/DL (ref 0.5–1.4)
GFR SERPLBLD CREATININE-BSD FMLA CKD-EPI: >60 ML/MIN/1.73/M2
GLUCOSE SERPL-MCNC: 82 MG/DL (ref 70–110)
PHOSPHATE SERPL-MCNC: 3 MG/DL (ref 2.7–4.5)
POTASSIUM SERPL-SCNC: 4.4 MMOL/L (ref 3.5–5.1)
SODIUM SERPL-SCNC: 137 MMOL/L (ref 136–145)

## 2025-07-07 PROCEDURE — 36415 COLL VENOUS BLD VENIPUNCTURE: CPT | Mod: HCNC,PO

## 2025-07-07 PROCEDURE — 80069 RENAL FUNCTION PANEL: CPT | Mod: HCNC

## 2025-07-07 NOTE — TELEPHONE ENCOUNTER
Pt called and stated he is having a flare up of knee pain. He usually takes ibuprofen for this, but on 06/16/2025 blood work show a decrease in kidney function. Instructed to avoid anti inflammatories. He would like to recheck blood work to see if he can take ibuprofen for this flare. Please advise.

## 2025-07-07 NOTE — TELEPHONE ENCOUNTER
Copied from CRM #[id number]. Topic: Medications - Medication Question  >> Jul 7, 2025 10:12 AM Remy wrote:  Type:  Needs Medical Advice    Who Called: Andrés   Symptoms (please be specific): knee pain   How long has patient had these symptoms:  n/a  Pharmacy name and phone #:    [facility] - [place], [place] - [address] [address]  [id number] [address]  [facility] [address]  Phone: [phone] Fax: [phone]    [facility]/pharmacy #[id number] - [place], [place] - [address]  [address] [address]  Phone: [phone] Fax: [phone]    [facility] - [place], [place] - [address]  [address] [facility] [address]  Phone: [phone] Fax: [phone]     Would the patient rather a call back or a response via [facility]? Call back   Best Call Back Number: [phone] (M)    Additional Information: pt is requesting a call in regards to seeing if it is ok for him to take ibuprofen.       Thanks KB

## 2025-07-07 NOTE — TELEPHONE ENCOUNTER
I have signed for the following orders AND/OR meds.  Please call the patient and ask the patient to schedule the testing AND/OR inform about any medications that were sent.      Orders Placed This Encounter   Procedures    Renal Function Panel     Standing Status:   Future     Expected Date:   7/7/2025     Expiration Date:   9/5/2026     Send normal result to authorizing provider's In Basket if patient is active on MyChart::   Yes

## 2025-07-08 ENCOUNTER — OFFICE VISIT (OUTPATIENT)
Dept: FAMILY MEDICINE | Facility: CLINIC | Age: 75
End: 2025-07-08
Payer: MEDICARE

## 2025-07-08 VITALS
HEART RATE: 85 BPM | HEIGHT: 71 IN | WEIGHT: 221 LBS | SYSTOLIC BLOOD PRESSURE: 122 MMHG | BODY MASS INDEX: 30.94 KG/M2 | DIASTOLIC BLOOD PRESSURE: 86 MMHG

## 2025-07-08 DIAGNOSIS — Z12.11 COLON CANCER SCREENING: ICD-10-CM

## 2025-07-08 DIAGNOSIS — I10 ESSENTIAL HYPERTENSION: Primary | ICD-10-CM

## 2025-07-08 DIAGNOSIS — M25.562 ACUTE PAIN OF LEFT KNEE: ICD-10-CM

## 2025-07-08 DIAGNOSIS — Z79.899 ENCOUNTER FOR LONG-TERM (CURRENT) USE OF MEDICATIONS: ICD-10-CM

## 2025-07-08 DIAGNOSIS — F41.9 ANXIETY: ICD-10-CM

## 2025-07-08 PROBLEM — R13.19 ESOPHAGEAL DYSPHAGIA: Status: ACTIVE | Noted: 2025-05-06

## 2025-07-08 PROBLEM — I50.32 CHRONIC HEART FAILURE WITH PRESERVED EJECTION FRACTION: Status: ACTIVE | Noted: 2024-11-18

## 2025-07-08 PROCEDURE — 1101F PT FALLS ASSESS-DOCD LE1/YR: CPT | Mod: CPTII,HCNC,S$GLB, | Performed by: FAMILY MEDICINE

## 2025-07-08 PROCEDURE — 1126F AMNT PAIN NOTED NONE PRSNT: CPT | Mod: CPTII,HCNC,S$GLB, | Performed by: FAMILY MEDICINE

## 2025-07-08 PROCEDURE — 4010F ACE/ARB THERAPY RXD/TAKEN: CPT | Mod: CPTII,HCNC,S$GLB, | Performed by: FAMILY MEDICINE

## 2025-07-08 PROCEDURE — 99214 OFFICE O/P EST MOD 30 MIN: CPT | Mod: HCNC,S$GLB,, | Performed by: FAMILY MEDICINE

## 2025-07-08 PROCEDURE — 1159F MED LIST DOCD IN RCRD: CPT | Mod: CPTII,HCNC,S$GLB, | Performed by: FAMILY MEDICINE

## 2025-07-08 PROCEDURE — G2211 COMPLEX E/M VISIT ADD ON: HCPCS | Mod: HCNC,S$GLB,, | Performed by: FAMILY MEDICINE

## 2025-07-08 PROCEDURE — 1160F RVW MEDS BY RX/DR IN RCRD: CPT | Mod: CPTII,HCNC,S$GLB, | Performed by: FAMILY MEDICINE

## 2025-07-08 PROCEDURE — 3288F FALL RISK ASSESSMENT DOCD: CPT | Mod: CPTII,HCNC,S$GLB, | Performed by: FAMILY MEDICINE

## 2025-07-08 PROCEDURE — 99999 PR PBB SHADOW E&M-EST. PATIENT-LVL IV: CPT | Mod: PBBFAC,HCNC,, | Performed by: FAMILY MEDICINE

## 2025-07-08 PROCEDURE — 3044F HG A1C LEVEL LT 7.0%: CPT | Mod: CPTII,HCNC,S$GLB, | Performed by: FAMILY MEDICINE

## 2025-07-08 PROCEDURE — 3074F SYST BP LT 130 MM HG: CPT | Mod: CPTII,HCNC,S$GLB, | Performed by: FAMILY MEDICINE

## 2025-07-08 PROCEDURE — 3008F BODY MASS INDEX DOCD: CPT | Mod: CPTII,HCNC,S$GLB, | Performed by: FAMILY MEDICINE

## 2025-07-08 PROCEDURE — 3079F DIAST BP 80-89 MM HG: CPT | Mod: CPTII,HCNC,S$GLB, | Performed by: FAMILY MEDICINE

## 2025-07-08 RX ORDER — IBUPROFEN 200 MG
200 TABLET ORAL EVERY 6 HOURS PRN
Qty: 30 TABLET | Refills: 0 | Status: SHIPPED | OUTPATIENT
Start: 2025-07-08

## 2025-07-08 NOTE — PROGRESS NOTES
PLAN:    Assessment & Plan    I10 Essential hypertension  Z79.899 Encounter for long-term (current) use of medications  F41.9 Anxiety  M25.562 Acute pain of left knee  Z12.11 Colon cancer screening    IMPRESSION:   Renal function appears improved from previous visit when patient was likely dehydrated.   BP is well-managed on current regimen. Clarified that low BP is generally not concerning as long as patient is asymptomatic.   Previous MRI showed bone spur with some remaining joint space in knee.   Acknowledged weight loss efforts and dietary changes.    PLAN SUMMARY:   Continue ibuprofen up to 200 mg TID PRN for knee pain, max 800 mg daily   Maintain current BP medication; may take half dose if BP remains low   Ordered labs in 3 months   Patient to wear mask when cutting grass for lung protection   Awaiting official emphysema diagnosis from pulmonologist    ESSENTIAL HYPERTENSION:   Continue current BP medication, may take half dose if BP remains low.    ENCOUNTER FOR LONG-TERM (CURRENT) USE OF MEDICATIONS:   Ordered labs in 3 months.    ACUTE PAIN OF LEFT KNEE:   Continue ibuprofen up to 200 mg 3 times daily as needed for knee pain, with maximum dose of 800 mg.    LIFESTYLE CHANGES:   Explained that CO2 levels can fluctuate based on breathing and are primarily used for critically ill patients.   Patient to continue wearing a mask when cutting grass to protect lungs.   Awaiting official results of emphysema diagnosis from pulmonologist.         Problem List Items Addressed This Visit       Essential hypertension - Primary (Chronic)    Continue current blood pressure medication regimen.Counseled on importance of hypertension disease course, I recommend ongoing Education for DASH-diet and exercise. Counseled on medication regimen importance of treating high blood pressure. Please be advised of risk of untreated blood pressure as discussed. Please advised of ER precautions were given for symptoms of hypertensive  urgency and emergency.          Anxiety (Chronic)     reviewed. Discussed medication risks. Refill Xanax for six months.  Follow-up in six months.  Discussed condition course and signs and symptoms to expect.  Patient advised of risk and benefits of medication use.  ER precautions.  Call MD or follow-up to clinic if not improving or worsening symptoms.    Please be advised of condition course.  SNRI/SSRI is first-line treatment for this condition.  Please be advised of the risk of discontinuing this medication without tapering/contacting MD.  Patient has been advised of side effects, and all questions were answered.  Patient voiced understanding.  Patient will follow up routinely and notify us if having any side effects or worsening or persistent symptoms.  ER precautions were given. Antidepressant/Antianxiety Medication Initiation:  Patient informed of risks, benefits, and potential side effects of medication and accepts informed consent.  Common side effects include nausea, fatigue, headache, insomnia, etc see medication insert for complete side effect profile.  Most importantly be advised of the possibility of new or worsening suicidal thoughts/depression/anxiety etcetera.  Please be advised to stop the medication immediately and seek urgent treatment if this occurs.  Therefore please do not to abruptly discontinue medication without physician guidance except in cases of sudden onset or worsening of SI.          Encounter for long-term (current) use of medications (Chronic)    Complete history and physical was completed today.  Complete and thorough medication reconciliation was performed.  Discussed risks and benefits of medications.  Advised patient on orders and health maintenance.  We discussed old records and old labs if available.  Will request any records not available through epic.  Continue current medications listed on your summary sheet.         Colon cancer screening    Relevant Orders     Ambulatory referral/consult to Endo Procedure     Acute pain of left knee    Relevant Medications    ibuprofen (ADVIL,MOTRIN) 200 MG tablet     Future Appointments       Date Provider Specialty Appt Notes    9/19/2025  Lab labs    11/17/2025 Loy Brewster Jr., MD Cardiology 6 months           Medication Management for assessment above:   Medication List with Changes/Refills   New Medications    IBUPROFEN (ADVIL,MOTRIN) 200 MG TABLET    Take 1 tablet (200 mg total) by mouth every 6 (six) hours as needed for Pain.   Current Medications    ALBUTEROL (PROVENTIL/VENTOLIN HFA) 90 MCG/ACTUATION INHALER    INHALE 2 PUFFS BY MOUTH EVERY 6 HOURS AS NEEDED FOR WHEEZE    ALPRAZOLAM (XANAX) 1 MG TABLET    Take 1 tablet (1 mg total) by mouth 3 (three) times daily.    ASPIRIN 81 MG CHEW    Take 81 mg by mouth once daily.    BUPROPION (WELLBUTRIN XL) 300 MG 24 HR TABLET    TAKE 1 TABLET BY MOUTH EVERY DAY    CHOLECALCIFEROL, VITAMIN D3, (VITAMIN D3) 25 MCG (1,000 UNIT) CAPSULE    Take 1,000 Units by mouth.    CO-ENZYME Q-10 30 MG CAPSULE    Take 30 mg by mouth once daily.     CYANOCOBALAMIN (VITAMIN B-12) 100 MCG TABLET    Take 100 mcg by mouth.    DIINDOLYLMETHANE, BULK, MISC    100 mg by Other route.    DOCUSATE SODIUM (COLACE) 100 MG CAPSULE    Take 1 capsule (100 mg total) by mouth 2 (two) times daily as needed for Constipation.    DORZOLAMIDE-TIMOLOL, PF, (COSOPT PF) 2-0.5 % DPET OPHTHALMIC SOLUTION        FAMOTIDINE (PEPCID) 40 MG TABLET    Take 1 tablet (40 mg total) by mouth once daily.    FISH OIL-OMEGA-3 FATTY ACIDS 300-1,000 MG CAPSULE    Take 1 capsule by mouth once daily.     LISINOPRIL (PRINIVIL,ZESTRIL) 40 MG TABLET    take 1 tablet by mouth once DAILY    MAGNESIUM OXIDE (MAG-OX) 400 MG (241.3 MG MAGNESIUM) TABLET    Take 400 mg by mouth once daily.     PRAVASTATIN (PRAVACHOL) 20 MG TABLET    Take 1 tablet (20 mg total) by mouth once daily.    TERAZOSIN (HYTRIN) 5 MG CAPSULE    Take 5 mg by mouth every  evening.    TESTOSTERONE CYPIONATE (DEPOTESTOTERONE CYPIONATE) 100 MG/ML INJECTION    Inject into the muscle.    ZINC GLUCONATE 50 MG TABLET    Take 50 mg by mouth.   Discontinued Medications    DICLOFENAC (VOLTAREN) 75 MG EC TABLET    Take 1 tablet (75 mg total) by mouth 2 (two) times daily as needed (pain).       Pedro Pablo Dc M.D.  ==========================================================================  Subjective:   Patient ID: Andrés Casillas is a 75 y.o. male.  has a past medical history of Anxiety, BPH (benign prostatic hypertrophy), DDD (degenerative disc disease), lumbar, Hypertension, and GAURANG (obstructive sleep apnea).   Chief Complaint: Follow-up      History of Present Illness    CHIEF COMPLAINT:  Patient presents for follow-up on chronic medical conditions, particularly concerning his kidney function, and has questions about taking anti-inflammatory medications.    HPI:  Patient reports problems with his left knee, including a spur causing discomfort. After overexerting himself during physical activity with his grandson, he had increased soreness and inflammation. To manage the pain, patient took one OTC ibuprofen 200mg daily, stating it was the only effective pain relief method.    Patient mentions a recent emphysema diagnosis from his pulmonary doctor. 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 He has been trying to wear a mask when cutting grass to protect his lungs.    Regarding blood pressure, patient reports cutting his medication in half while maintaining low blood pressure. He checks it daily and adjusts his medication accordingly, increasing to a full pill if his blood pressure rises.    Patient started a diet and reduced carb intake, resulting in a 10-pound weight loss in 2 weeks. He has been visiting a health food store and taking supplements for lung health.    Patient recalls a previous colonoscopy where  inadequate preparation led to procedural difficulties. He acknowledges the need for another colonoscopy as it is due.    MEDICATIONS:  Patient is on Ibuprofen 200 mg, taking 1 tablet daily as needed for knee pain and inflammation. He has reduced the dosage of his blood pressure medication to half a pill. One blood pressure medication has been discontinued.    MEDICAL HISTORY:  Patient has a history of kidney function issues, emphysema, and hypertension.    SURGICAL HISTORY:  Patient underwent an MRI of the knee, which identified a spur. He also received a PRP (platelet-rich plasma) injection in his knee.    SOCIAL HISTORY:  Smoking: Denies smoking  Alcohol: Consumes alcohol Occupation: Worked in a chemical plant for 36 years, now retired  Marital status:       ROS:  ROS as indicated in HPI.         Problem List Items Addressed This Visit       Essential hypertension - Primary (Chronic)    Overview   July 2025:   Hypertension Medications              lisinopriL (PRINIVIL,ZESTRIL) 40 MG tablet take 1 tablet by mouth once DAILY    terazosin (HYTRIN) 5 MG capsule Take 5 mg by mouth every evening.          CHRONIC. STABLE. BP Reviewed.  Compliant with BP medications. No SE reported.   (-) CP, SOB, palpitations, dizziness, lightheadedness, HA, arm numbness, tingling or weakness, syncope.    Creatinine   Date Value Ref Range Status   07/07/2025 0.9 0.5 - 1.4 mg/dL Final            Current Assessment & Plan   Continue current blood pressure medication regimen.Counseled on importance of hypertension disease course, I recommend ongoing Education for DASH-diet and exercise. Counseled on medication regimen importance of treating high blood pressure. Please be advised of risk of untreated blood pressure as discussed. Please advised of ER precautions were given for symptoms of hypertensive urgency and emergency.          Anxiety (Chronic)    Overview   No changes in history. Remains on Xanax PRN as prescribed. Symptoms are  stable. He has been on this medication for a few years. No SE reported.   June 2024:  Patient needing medication refills.  January 2024:  Patient reports compliance with medications.  No side effects reported.  Symptoms are controlled.  Patient has increased anxiety about his health.  Recently had stent placed in his heart.  Chronic.  August 2023: Patient here for medication refill.  Reports compliance.  No side effects reported.  Symptoms are controlled.  Stable.  Patient on chronic benzodiazepine from previous PCP.  Transitioning care to me.The patient was checked in the [facility] [employer] [facility] [employer]acy's  Prescription Monitoring Program. There appears to be no incongruities with the patient's verbalized history.   No abuse suspected.  February 2021:  Patient had PVCs on cardiac workup.  Patient reports cardiology has adjusted medications that he is doing much better.  Blood pressure has been well controlled.  May 2022:  Patient is here for medication refills.  He continues to have symptoms that are intermittently controlled with medication.         Current Assessment & Plan    reviewed. Discussed medication risks. Refill Xanax for six months.  Follow-up in six months.  Discussed condition course and signs and symptoms to expect.  Patient advised of risk and benefits of medication use.  ER precautions.  Call MD or follow-up to clinic if not improving or worsening symptoms.    Please be advised of condition course.  SNRI/SSRI is first-line treatment for this condition.  Please be advised of the risk of discontinuing this medication without tapering/contacting MD.  Patient has been advised of side effects, and all questions were answered.  Patient voiced understanding.  Patient will follow up routinely and notify us if having any side effects or worsening or persistent symptoms.  ER precautions were given. Antidepressant/Antianxiety Medication Initiation:  Patient informed of risks, benefits, and potential side  effects of medication and accepts informed consent.  Common side effects include nausea, fatigue, headache, insomnia, etc see medication insert for complete side effect profile.  Most importantly be advised of the possibility of new or worsening suicidal thoughts/depression/anxiety etcetera.  Please be advised to stop the medication immediately and seek urgent treatment if this occurs.  Therefore please do not to abruptly discontinue medication without physician guidance except in cases of sudden onset or worsening of SI.          Encounter for long-term (current) use of medications (Chronic)    Overview   July 2025:  Reviewed labs.  CHRONIC. Stable. Compliant with medications for managed conditions. See medication list. No SE reported. Routine lab analysis is being monitored. Refills were addressed. Reviewed labs.    Lab Results   Component Value Date    WBC 5.11 01/14/2025    HGB 14.1 01/14/2025    HCT 42.0 01/14/2025    MCV 94 01/14/2025     01/14/2025         Chemistry        Component Value Date/Time     07/07/2025 1406     01/14/2025 0925    K 4.4 07/07/2025 1406    K 4.6 01/14/2025 0925     07/07/2025 1406     01/14/2025 0925    CO2 22 (L) 07/07/2025 1406    CO2 25 01/14/2025 0925    BUN 21 07/07/2025 1406    CREATININE 0.9 07/07/2025 1406    GLU 82 07/07/2025 1406    GLU 95 01/14/2025 0925        Component Value Date/Time    CALCIUM 9.1 07/07/2025 1406    CALCIUM 9.5 01/14/2025 0925    ALKPHOS 96 06/16/2025 0952    ALKPHOS 93 01/14/2025 0925    AST 24 06/16/2025 0952    AST 26 01/14/2025 0925    ALT 27 06/16/2025 0952    ALT 28 01/14/2025 0925    BILITOT 0.6 06/16/2025 0952    BILITOT 0.9 01/14/2025 0925    ESTGFRAFRICA >60.0 05/27/2022 1129    EGFRNONAA >60.0 05/27/2022 1129        Lab Results   Component Value Date    TSH 2.382 01/14/2025    Q6FAQVH 6.5 06/30/2011            Current Assessment & Plan   Complete history and physical was completed today.  Complete and thorough  medication reconciliation was performed.  Discussed risks and benefits of medications.  Advised patient on orders and health maintenance.  We discussed old records and old labs if available.  Will request any records not available through epic.  Continue current medications listed on your summary sheet.         Colon cancer screening    Acute pain of left knee        Review of patient's allergies indicates:   Allergen Reactions    No known drug allergies      Current Outpatient Medications   Medication Instructions    albuterol (PROVENTIL/VENTOLIN HFA) 90 mcg/actuation inhaler 2 puffs, Inhalation, Every 6 hours PRN    ALPRAZolam (XANAX) 1 mg, Oral, 3 times daily    aspirin 81 mg, Daily    buPROPion (WELLBUTRIN XL) 300 MG 24 hr tablet TAKE 1 TABLET BY MOUTH EVERY DAY    cholecalciferol (vitamin D3) (VITAMIN D3) 1,000 Units    co-enzyme Q-10 30 mg, Daily    cyanocobalamin (VITAMIN B-12) 100 mcg    DIINDOLYLMETHANE, BULK, MISC 100 mg    docusate sodium (COLACE) 100 mg, Oral, 2 times daily PRN    dorzolamide-timolol, PF, (COSOPT PF) 2-0.5 % Dpet ophthalmic solution No dose, route, or frequency recorded.    famotidine (PEPCID) 40 mg, Oral, Daily    fish oil-omega-3 fatty acids 300-1,000 mg capsule 1 capsule, Daily    ibuprofen (ADVIL,MOTRIN) 200 mg, Oral, Every 6 hours PRN    lisinopriL (PRINIVIL,ZESTRIL) 40 mg, Oral    magnesium oxide (MAG-OX) 400 mg, Daily    pravastatin (PRAVACHOL) 20 mg, Oral, Daily    terazosin (HYTRIN) 5 mg, Nightly    testosterone cypionate (DEPOTESTOTERONE CYPIONATE) 100 mg/mL injection Inject into the muscle.    zinc gluconate 50 mg      I have reviewed the PMH, social history, FamilyHx, surgical history, allergies and medications documented / confirmed by the patient at the time of this visit.  Review of Systems   Constitutional:  Positive for fatigue. Negative for chills, fever and unexpected weight change.   HENT:  Negative for ear pain and sore throat.    Eyes:  Negative for redness and  "visual disturbance.   Respiratory:  Negative for cough and shortness of breath.    Cardiovascular:  Negative for chest pain and palpitations.   Gastrointestinal:  Negative for nausea and vomiting.   Endocrine: Negative for cold intolerance and heat intolerance.   Genitourinary:  Negative for difficulty urinating and hematuria.   Musculoskeletal:  Positive for arthralgias. Negative for myalgias.   Skin:  Negative for rash and wound.   Allergic/Immunologic: Negative for environmental allergies and food allergies.   Neurological:  Negative for weakness and headaches.   Hematological:  Negative for adenopathy. Does not bruise/bleed easily.   Psychiatric/Behavioral:  Negative for sleep disturbance. The patient is not nervous/anxious.      Objective:   /86   Pulse 85   Ht 5' 11" (1.803 m)   Wt 100.2 kg (221 lb)   BMI 30.82 kg/m²   Physical Exam  Vitals and nursing note reviewed.   Constitutional:       General: He is not in acute distress.     Appearance: He is well-developed. He is not diaphoretic.   HENT:      Head: Normocephalic and atraumatic.      Right Ear: External ear normal.      Left Ear: External ear normal.      Nose: Nose normal. No rhinorrhea.   Eyes:      Extraocular Movements: Extraocular movements intact.      Pupils: Pupils are equal, round, and reactive to light.   Cardiovascular:      Rate and Rhythm: Normal rate.      Pulses: Normal pulses.   Pulmonary:      Effort: Pulmonary effort is normal. No respiratory distress.      Breath sounds: Normal breath sounds.   Abdominal:      General: Bowel sounds are normal.      Palpations: Abdomen is soft.   Musculoskeletal:         General: Tenderness present. No deformity. Normal range of motion.      Cervical back: Normal range of motion and neck supple.      Right lower leg: No edema.      Left lower leg: No edema.   Skin:     General: Skin is warm and dry.      Capillary Refill: Capillary refill takes less than 2 seconds.      Findings: No rash. "   Neurological:      General: No focal deficit present.      Mental Status: He is alert and oriented to person, place, and time. Mental status is at baseline.      Cranial Nerves: No cranial nerve deficit.      Motor: No weakness.      Gait: Gait normal.   Psychiatric:         Attention and Perception: He is attentive.         Mood and Affect: Mood normal. Mood is not anxious or depressed. Affect is not labile, blunt, angry or inappropriate.         Speech: He is communicative. Speech is not rapid and pressured, delayed, slurred or tangential.         Behavior: Behavior normal. Behavior is not agitated, slowed, aggressive, withdrawn, hyperactive or combative.         Thought Content: Thought content normal. Thought content is not paranoid or delusional. Thought content does not include homicidal or suicidal ideation. Thought content does not include homicidal or suicidal plan.         Cognition and Memory: Memory is not impaired.         Judgment: Judgment normal. Judgment is not impulsive or inappropriate.         Assessment:     1. Essential hypertension    2. Encounter for long-term (current) use of medications    3. Anxiety    4. Acute pain of left knee    5. Colon cancer screening      MDM:   Moderate medical complexity.  Moderate risk.  Total time: 21 minutes.  This includes total time spent on the encounter, which includes face to face time and non-face to face time preparing to see the patient (eg, review of previous medical records, tests), Obtaining and/or reviewing separately obtained history, documenting clinical information in the electronic or other health record, independently interpreting results (not separately reported)/communicating results to the patient/family/caregiver, and/or care coordination (not separately reported).    I have Reviewed and summarized old records.  I have performed thorough medication reconciliation today and discussed risk and benefits of medications.  I have reviewed labs  and discussed with patient.  All questions were answered.  I am requesting old records and will review them once they are available.  Visit today included increased complexity associated with the care of the episodic problem see above assessment addressed and managing the longitudinal care of the patient due to the serious and/or complex managed problem(s) see above.    I have signed for the following orders AND/OR meds.  Orders Placed This Encounter   Procedures    Ambulatory referral/consult to Endo Procedure      Standing Status:   Future     Expected Date:   7/9/2025     Expiration Date:   1/31/2026     Referral Priority:   Routine     Referral Type:   Consultation     Number of Visits Requested:   1     Medications Ordered This Encounter   Medications    ibuprofen (ADVIL,MOTRIN) 200 MG tablet     Sig: Take 1 tablet (200 mg total) by mouth every 6 (six) hours as needed for Pain.     Dispense:  30 tablet     Refill:  0        Follow up in about 6 months (around 1/8/2026), or if symptoms worsen or fail to improve.  Future Appointments       Date Provider Specialty Appt Notes    9/19/2025  Lab labs    11/17/2025 Loy Brewster Jr., MD Cardiology 6 months          If no improvement in symptoms or symptoms worsen, advised to call/follow-up at clinic or go to ER. Patient voiced understanding and all questions/concerns were addressed.   DISCLAIMER: This note was compiled by using a speech recognition dictation system and therefore please be aware that typographical / speech recognition errors can and do occur.  Please contact me if you see any errors specifically.  Consent was obtained for recording system prior to the visit.    This note was generated with the assistance of ambient listening technology. Verbal consent was obtained by the patient and accompanying visitor(s) for the recording of patient appointment to facilitate this note. I attest to having reviewed and edited the generated note for  accuracy, though some syntax or spelling errors may persist. Please contact the author of this note for any clarification.          Pedro Pablo Dc M.D.       Office: [phone] [address] [place] [address]  FAX: [phone]

## 2025-07-08 NOTE — PATIENT INSTRUCTIONS
Follow up in about 6 months (around 1/8/2026), or if symptoms worsen or fail to improve.     Dear patient,   As a result of recent federal legislation (The Federal Cures Act), you may receive lab or pathology results from your visit in your [facility] account before your physician is able to contact you. Your physician or their representative will relay the results to you with their recommendations at their soonest availability.     If no improvement in symptoms or symptoms worsen, please be advised to call MD, follow-up at clinic and/or go to ER if becomes severe.    Pedro Pablo Dc M.D.        We Offer TELEHEALTH & Same Day Appointments!   Book your Telehealth appointment with me through my nurse or   Clinic appointments on [employer]!    [address] [place] [address]    Office: [phone]   FAX: [phone]    Check out my Facebook Page and Follow Me at: [email or web address]    Check out my website at [employer] by clicking on: [email or web address]    To Schedule appointments [email or web address] go to [email or web address]: [email or web address]

## 2025-07-23 ENCOUNTER — TELEPHONE (OUTPATIENT)
Dept: FAMILY MEDICINE | Facility: CLINIC | Age: 75
End: 2025-07-23

## 2025-07-23 ENCOUNTER — HOSPITAL ENCOUNTER (OUTPATIENT)
Dept: RADIOLOGY | Facility: HOSPITAL | Age: 75
Discharge: HOME | End: 2025-07-23
Attending: NURSE PRACTITIONER
Payer: MEDICARE

## 2025-07-23 ENCOUNTER — OFFICE VISIT (OUTPATIENT)
Dept: FAMILY MEDICINE | Facility: CLINIC | Age: 75
End: 2025-07-23
Payer: MEDICARE

## 2025-07-23 VITALS
SYSTOLIC BLOOD PRESSURE: 105 MMHG | BODY MASS INDEX: 30.52 KG/M2 | WEIGHT: 218 LBS | HEART RATE: 76 BPM | TEMPERATURE: 97 F | OXYGEN SATURATION: 96 % | HEIGHT: 71 IN | DIASTOLIC BLOOD PRESSURE: 64 MMHG
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
## 2025-07-23 DIAGNOSIS — R07.81 RIB PAIN ON RIGHT SIDE: ICD-10-CM

## 2025-07-23 DIAGNOSIS — M89.8X1 PAIN OF RIGHT SCAPULA: ICD-10-CM
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
## 2025-07-23 PROCEDURE — 3074F SYST BP LT 130 MM HG: CPT | Mod: CPTII,S$GLB,, | Performed by: NURSE PRACTITIONER

## 2025-07-23 PROCEDURE — 71100 X-RAY EXAM RIBS UNI 2 VIEWS: CPT | Mod: TC,HCNC,PO,RT

## 2025-07-23 PROCEDURE — 99999 PR PBB SHADOW E&M-EST. PATIENT-LVL V: CPT | Mod: PBBFAC,HCNC,, | Performed by: NURSE PRACTITIONER

## 2025-07-23 PROCEDURE — 71046 X-RAY EXAM CHEST 2 VIEWS: CPT | Mod: TC,HCNC,PO

## 2025-07-23 PROCEDURE — 1159F MED LIST DOCD IN RCRD: CPT | Mod: CPTII,S$GLB,, | Performed by: NURSE PRACTITIONER

## 2025-07-23 PROCEDURE — 1160F RVW MEDS BY RX/DR IN RCRD: CPT | Mod: CPTII,S$GLB,, | Performed by: NURSE PRACTITIONER

## 2025-07-23 PROCEDURE — 73010 X-RAY EXAM OF SHOULDER BLADE: CPT | Mod: TC,HCNC,PO,RT

## 2025-07-23 PROCEDURE — 3288F FALL RISK ASSESSMENT DOCD: CPT | Mod: CPTII,S$GLB,, | Performed by: NURSE PRACTITIONER

## 2025-07-23 PROCEDURE — 4010F ACE/ARB THERAPY RXD/TAKEN: CPT | Mod: CPTII,S$GLB,, | Performed by: NURSE PRACTITIONER

## 2025-07-23 PROCEDURE — 3044F HG A1C LEVEL LT 7.0%: CPT | Mod: CPTII,S$GLB,, | Performed by: NURSE PRACTITIONER

## 2025-07-23 PROCEDURE — 73010 X-RAY EXAM OF SHOULDER BLADE: CPT | Mod: 26,HCNC,RT, | Performed by: STUDENT IN AN ORGANIZED HEALTH CARE EDUCATION/TRAINING PROGRAM

## 2025-07-23 PROCEDURE — 99213 OFFICE O/P EST LOW 20 MIN: CPT | Mod: S$GLB,,, | Performed by: NURSE PRACTITIONER

## 2025-07-23 PROCEDURE — 71100 X-RAY EXAM RIBS UNI 2 VIEWS: CPT | Mod: 26,HCNC,RT, | Performed by: RADIOLOGY

## 2025-07-23 PROCEDURE — 1125F AMNT PAIN NOTED PAIN PRSNT: CPT | Mod: CPTII,S$GLB,, | Performed by: NURSE PRACTITIONER

## 2025-07-23 PROCEDURE — 3078F DIAST BP <80 MM HG: CPT | Mod: CPTII,S$GLB,, | Performed by: NURSE PRACTITIONER

## 2025-07-23 PROCEDURE — 71046 X-RAY EXAM CHEST 2 VIEWS: CPT | Mod: 26,HCNC,, | Performed by: RADIOLOGY

## 2025-07-23 PROCEDURE — 1100F PTFALLS ASSESS-DOCD GE2>/YR: CPT | Mod: CPTII,S$GLB,, | Performed by: NURSE PRACTITIONER

## 2025-07-23 PROCEDURE — 3008F BODY MASS INDEX DOCD: CPT | Mod: CPTII,S$GLB,, | Performed by: NURSE PRACTITIONER

## 2025-07-23 RX ORDER — TRAMADOL HYDROCHLORIDE 50 MG/1
50 TABLET, FILM COATED ORAL EVERY 8 HOURS PRN
Qty: 15 TABLET | Refills: 0 | Status: SHIPPED | OUTPATIENT
Start: 2025-07-23 | End: 2025-07-28

## 2025-07-23 NOTE — PROGRESS NOTES
Subjective     Patient ID: Andrés Casillas is a 75 y.o. male.    Chief Complaint: Rib Injury    Fall  The accident occurred 2 days ago. The fall occurred while walking. He fell from a height of 1 to 2 ft. He landed on Hard floor. There was no blood loss. Point of impact: right ribs. Pain location: right ribs. The pain is moderate. The symptoms are aggravated by movement. Pertinent negatives include no abdominal pain, bowel incontinence, fever, headaches, hearing loss, hematuria, loss of consciousness, nausea, numbness, tingling, visual change or vomiting. He has tried NSAID for the symptoms. The treatment provided mild relief.     Past Medical History:   Diagnosis Date    Anxiety     BPH (benign prostatic hypertrophy)     DDD (degenerative disc disease), lumbar     Hypertension     GAURANG (obstructive sleep apnea)      Social History[1]  Past Surgical History:   Procedure Laterality Date    JOINT REPLACEMENT      LEFT HEART CATHETERIZATION Left 12/18/2023    Procedure: Left heart cath;  Surgeon: Dylan Johnson MD;  Location: [facility] CATH LAB;  Service: Cardiology;  Laterality: Left;  Repeat K+ stat upon admit    PERCUTANEOUS CORONARY INTERVENTION, ARTERY N/A 12/18/2023    Procedure: Percutaneous coronary intervention;  Surgeon: Dylan Johnson MD;  Location: [facility] CATH LAB;  Service: Cardiology;  Laterality: N/A;    STENT, DRUG ELUTING, SINGLE VESSEL, CORONARY  12/18/2023    Procedure: Stent, Drug Eluting, Single Vessel, Coronary;  Surgeon: Dylan Johnson MD;  Location: [facility] CATH LAB;  Service: Cardiology;;       Review of Systems   Constitutional: Negative.  Negative for fever.   HENT: Negative.     Eyes: Negative.    Respiratory: Negative.     Cardiovascular: Negative.    Gastrointestinal: Negative.  Negative for abdominal pain, bowel incontinence, nausea and vomiting.   Endocrine: Negative.    Genitourinary: Negative.  Negative for hematuria.   Musculoskeletal:         Right ribs   Integumentary:   Negative.   Allergic/Immunologic: Negative.    Neurological: Negative.  Negative for tingling, loss of consciousness, numbness and headaches.   Psychiatric/Behavioral: Negative.            Objective     Physical Exam  Vitals and nursing note reviewed.   Constitutional:       Appearance: Normal appearance.   HENT:      Head: Normocephalic.      Right Ear: Tympanic membrane, ear canal and external ear normal.      Left Ear: Tympanic membrane, ear canal and external ear normal.      Nose: Nose normal.      Mouth/Throat:      Mouth: Mucous membranes are moist.      Pharynx: Oropharynx is clear.   Eyes:      Conjunctiva/sclera: Conjunctivae normal.      Pupils: Pupils are equal, round, and reactive to light.   Cardiovascular:      Rate and Rhythm: Normal rate and regular rhythm.      Pulses: Normal pulses.      Heart sounds: Normal heart sounds.   Pulmonary:      Effort: Pulmonary effort is normal.      Breath sounds: Normal breath sounds.   Chest:      Chest wall: Tenderness (right ribs) present. No mass, lacerations, deformity, swelling, crepitus or edema. There is no dullness to percussion.   Abdominal:      General: Bowel sounds are normal.      Palpations: Abdomen is soft.   Musculoskeletal:         General: Normal range of motion.      Cervical back: Normal range of motion and neck supple.   Skin:     General: Skin is warm and dry.      Capillary Refill: Capillary refill takes 2 to 3 seconds.   Neurological:      Mental Status: He is alert and oriented to person, place, and time.   Psychiatric:         Mood and Affect: Mood normal.         Behavior: Behavior normal.         Thought Content: Thought content normal.         Judgment: Judgment normal.            Assessment and Plan     1. Fall, subsequent encounter  2. Rib pain on right side  3. Pain of right scapula  -     X-Ray Ribs 2 View Right; Future; Expected date: 07/23/2025  -     X-Ray Chest PA And Lateral; Future; Expected date: 07/23/2025  -     X-Ray  Scapula Right; Future; Expected date: 07/23/2025  Ultram request sent to PCP to approve  Deep breathing exercises  Splint with pillow with movement  Hydrate well  Rest  Report to ER immediately if symptoms worsen or persist               No follow-ups on file.         [1]   Social History  Socioeconomic History    Marital status: Single   Occupational History     Employer: [employer]   Tobacco Use    Smoking status: Never    Smokeless tobacco: Never   Substance and Sexual Activity    Alcohol use: Yes     Alcohol/week: 1.7 standard drinks of alcohol     Types: 2 Standard drinks or equivalent per week     Comment: socially    Drug use: No    Sexual activity: Yes     Partners: Female   Social History Narrative    ** Merged History Encounter **          Social Drivers of Health     Financial Resource Strain: Low Risk  (1/9/2025)    Overall Financial Resource Strain (CARDIA)     Difficulty of Paying Living Expenses: Not hard at all   Food Insecurity: No Food Insecurity (1/9/2025)    Hunger Vital Sign     Worried About Running Out of Food in the Last Year: Never true     Ran Out of Food in the Last Year: Never true   Transportation Needs: No Transportation Needs (7/13/2020)    PRAPARE - Transportation     Lack of Transportation (Medical): No     Lack of Transportation (Non-Medical): No   Physical Activity: Insufficiently Active (1/9/2025)    Exercise Vital Sign     Days of Exercise per Week: 2 days     Minutes of Exercise per Session: 40 min   Stress: No Stress Concern Present (1/9/2025)    Citizen of [place] [employer] - Occupational Stress Questionnaire     Feeling of Stress : Only a little   Housing Stability: Unknown (1/9/2025)    Housing Stability Vital Sign     Unable to Pay for Housing in the Last Year: No

## 2025-07-23 NOTE — PATIENT INSTRUCTIONS
Ultram request sent to PCP to approve  Deep breathing exercises  Splint with pillow with movement  Hydrate well  Rest  Report to ER immediately if symptoms worsen or persist  Ketan Bowen,     If you are due for any health screening(s) below please notify me so we can arrange them to be ordered and scheduled. Most healthy patients at your age complete them, but you are free to accept or refuse.     If you can't do it, I'll definitely understand. If you can, I'd certainly appreciate it!    Tests to Keep You Healthy    Colon Cancer Screening: DUE  Last Blood Pressure <= 139/89 (7/23/2025): Yes      Its time for your colon cancer screening     Colorectal cancer is one of the leading causes of cancer death for men and women but it doesnt have to be. Screenings can prevent colorectal cancer or find it early enough to treat and cure the disease.     Our records indicate that you may be overdue for colon cancer screening. A colonoscopy or stool screening test can help identify patients at risk for developing colon cancer. Cancer screenings save lives, so schedule yours today to stay healthy.     A colonoscopy is the preferred test for detecting colon cancer. It is needed only once every 10 years if results are negative. While you are sedated, a flexible, lighted tube with a tiny camera is inserted into the rectum and advanced through the colon to look for cancers.     An alternative screening test that is used at home and returned to the lab may also be used. It detects hidden blood in bowel movements which could indicate cancer in the colon. If results are positive, you will need a colonoscopy to determine if the blood is a sign of cancer. This type of follow up (diagnostic) colonoscopy usually requires additional copays as required by your insurance provider.     If you recently had your colon cancer screening performed outside of [facility], please let your Health care team know so that they can update your  health record. Please contact your PCP if you have any questions.                 I will SWITCH the dose or number of times a day I take the medications listed below when I get home from the hospital:  None

## 2025-07-25 ENCOUNTER — HOSPITAL ENCOUNTER (OUTPATIENT)
Dept: RADIOLOGY | Facility: HOSPITAL | Age: 75
Discharge: HOME | End: 2025-07-25
Attending: NURSE PRACTITIONER
Payer: MEDICARE

## 2025-07-25 ENCOUNTER — TELEPHONE (OUTPATIENT)
Dept: FAMILY MEDICINE | Facility: CLINIC | Age: 75
End: 2025-07-25
Payer: MEDICARE

## 2025-07-25 DIAGNOSIS — R93.89 ABNORMAL CXR: ICD-10-CM

## 2025-07-25 PROCEDURE — 71250 CT THORAX DX C-: CPT | Mod: TC,PO

## 2025-07-25 PROCEDURE — 71250 CT THORAX DX C-: CPT | Mod: 26,,, | Performed by: RADIOLOGY

## 2025-07-25 NOTE — TELEPHONE ENCOUNTER
Call returned. Pt was advised that his CT chest results has not been reviewed by the NP who ordered the imaging. Once the results are reviewed, pt was advised that he will be called with the interpretation. Pt verbalized understanding.

## 2025-07-25 NOTE — TELEPHONE ENCOUNTER
Copied from CRM #[id number]. Topic: General Inquiry - Test Results  >> Jul 25, 2025 11:30 SATHISH Jeffers wrote:  ..Type:  Patient Requesting Call    Who Called: Andrés Casillas  What is the call regarding?:  pt would like someone from the office to go over his x-ray and Ct scan with him.  Would the patient rather a call back or a response via [facility]?  call  Best Call Back Number: [phone] (home)  Additional Information:

## 2025-07-29 ENCOUNTER — TELEPHONE (OUTPATIENT)
Dept: CARDIOTHORACIC SURGERY | Facility: CLINIC | Age: 75
End: 2025-07-29
Payer: MEDICARE

## 2025-07-29 NOTE — TELEPHONE ENCOUNTER
Called pt to schedule referral appt, pt refused at this time. Pt stated he feels that the rib fracture is improving and does not feel he needs to be seen.

## 2025-08-13 ENCOUNTER — OFFICE VISIT (OUTPATIENT)
Dept: FAMILY MEDICINE | Facility: CLINIC | Age: 75
End: 2025-08-13
Payer: MEDICARE

## 2025-08-13 VITALS
HEART RATE: 65 BPM | BODY MASS INDEX: 29.68 KG/M2 | DIASTOLIC BLOOD PRESSURE: 75 MMHG | SYSTOLIC BLOOD PRESSURE: 119 MMHG | TEMPERATURE: 97 F | HEIGHT: 71 IN | WEIGHT: 212 LBS

## 2025-08-13 DIAGNOSIS — H10.9 CONJUNCTIVITIS OF LEFT EYE, UNSPECIFIED CONJUNCTIVITIS TYPE: ICD-10-CM

## 2025-08-13 DIAGNOSIS — H00.015 HORDEOLUM EXTERNUM OF LEFT LOWER EYELID: Primary | ICD-10-CM

## 2025-08-13 PROCEDURE — G2211 COMPLEX E/M VISIT ADD ON: HCPCS | Mod: HCNC,S$GLB,, | Performed by: NURSE PRACTITIONER

## 2025-08-13 PROCEDURE — 99999 PR PBB SHADOW E&M-EST. PATIENT-LVL IV: CPT | Mod: PBBFAC,HCNC,, | Performed by: NURSE PRACTITIONER

## 2025-08-13 PROCEDURE — 99213 OFFICE O/P EST LOW 20 MIN: CPT | Mod: HCNC,S$GLB,, | Performed by: NURSE PRACTITIONER

## 2025-08-13 PROCEDURE — 3288F FALL RISK ASSESSMENT DOCD: CPT | Mod: CPTII,HCNC,S$GLB, | Performed by: NURSE PRACTITIONER

## 2025-08-13 PROCEDURE — 3044F HG A1C LEVEL LT 7.0%: CPT | Mod: CPTII,HCNC,S$GLB, | Performed by: NURSE PRACTITIONER

## 2025-08-13 PROCEDURE — 3078F DIAST BP <80 MM HG: CPT | Mod: CPTII,HCNC,S$GLB, | Performed by: NURSE PRACTITIONER

## 2025-08-13 PROCEDURE — 1101F PT FALLS ASSESS-DOCD LE1/YR: CPT | Mod: CPTII,HCNC,S$GLB, | Performed by: NURSE PRACTITIONER

## 2025-08-13 PROCEDURE — 4010F ACE/ARB THERAPY RXD/TAKEN: CPT | Mod: CPTII,HCNC,S$GLB, | Performed by: NURSE PRACTITIONER

## 2025-08-13 PROCEDURE — 1160F RVW MEDS BY RX/DR IN RCRD: CPT | Mod: CPTII,HCNC,S$GLB, | Performed by: NURSE PRACTITIONER

## 2025-08-13 PROCEDURE — 1159F MED LIST DOCD IN RCRD: CPT | Mod: CPTII,HCNC,S$GLB, | Performed by: NURSE PRACTITIONER

## 2025-08-13 PROCEDURE — 3008F BODY MASS INDEX DOCD: CPT | Mod: CPTII,HCNC,S$GLB, | Performed by: NURSE PRACTITIONER

## 2025-08-13 PROCEDURE — 1125F AMNT PAIN NOTED PAIN PRSNT: CPT | Mod: CPTII,HCNC,S$GLB, | Performed by: NURSE PRACTITIONER

## 2025-08-13 PROCEDURE — 3074F SYST BP LT 130 MM HG: CPT | Mod: CPTII,HCNC,S$GLB, | Performed by: NURSE PRACTITIONER

## 2025-08-13 RX ORDER — TOBRAMYCIN 3 MG/ML
1 SOLUTION/ DROPS OPHTHALMIC EVERY 6 HOURS
Qty: 5 ML | Refills: 0 | Status: SHIPPED | OUTPATIENT
Start: 2025-08-13 | End: 2025-08-20

## 2025-08-13 RX ORDER — CEPHALEXIN 500 MG/1
500 CAPSULE ORAL EVERY 12 HOURS
Qty: 14 CAPSULE | Refills: 0 | Status: SHIPPED | OUTPATIENT
Start: 2025-08-13 | End: 2025-08-20

## 2025-08-20 ENCOUNTER — TELEPHONE (OUTPATIENT)
Dept: CARDIOLOGY | Facility: CLINIC | Age: 75
End: 2025-08-20
Payer: MEDICARE

## 2025-08-25 PROBLEM — M25.562 ACUTE PAIN OF LEFT KNEE: Status: RESOLVED | Noted: 2025-07-08 | Resolved: 2025-08-25

## 2025-08-27 ENCOUNTER — PATIENT OUTREACH (OUTPATIENT)
Dept: ADMINISTRATIVE | Facility: HOSPITAL | Age: 75
End: 2025-08-27
Payer: MEDICARE

## 2025-09-05 DIAGNOSIS — F41.9 ANXIETY: Chronic | ICD-10-CM

## 2025-09-05 RX ORDER — BUPROPION HYDROCHLORIDE 300 MG/1
300 TABLET ORAL
Qty: 90 TABLET | Refills: 3 | Status: SHIPPED | OUTPATIENT
Start: 2025-09-05

## (undated) DEVICE — CATH PIG145 INFINITI 5X110CM

## (undated) DEVICE — KIT MANIFOLD LOW PRESS TUBING

## (undated) DEVICE — KIT SYR REUSABLE

## (undated) DEVICE — BAND TR COMP DEVICE REG 24CM

## (undated) DEVICE — PACK HEART CATH BR

## (undated) DEVICE — CATH JL3.5 5FR

## (undated) DEVICE — CATH JL4 5FR

## (undated) DEVICE — CATH INFINITI MP JL3.5 JR4 5FR

## (undated) DEVICE — CATH EMERGE MR 12 X 3.00

## (undated) DEVICE — DRAPE ANGIO BRACH 38X44IN

## (undated) DEVICE — GUIDEWIRE WHOLEY HI TORQ 175CM

## (undated) DEVICE — GUIDE LAUNCHER 6FR AR1.0

## (undated) DEVICE — ANGIOTOUCH KIT

## (undated) DEVICE — OMNIPAQUE 300MG 150ML VIAL

## (undated) DEVICE — CATH JR4 5FR

## (undated) DEVICE — GUIDEWIRE EMERALD .035IN 260CM

## (undated) DEVICE — CATH EMERGE MR 12 X 2.50

## (undated) DEVICE — KIT GLIDESHEATH SLEND 6FR 10CM

## (undated) DEVICE — WIRE PTCE CHOICE PT .014X182